# Patient Record
Sex: FEMALE | Race: BLACK OR AFRICAN AMERICAN | Employment: UNEMPLOYED | URBAN - METROPOLITAN AREA
[De-identification: names, ages, dates, MRNs, and addresses within clinical notes are randomized per-mention and may not be internally consistent; named-entity substitution may affect disease eponyms.]

---

## 2024-01-01 ENCOUNTER — APPOINTMENT (OUTPATIENT)
Dept: ULTRASOUND IMAGING | Facility: HOSPITAL | Age: 44
End: 2024-01-01
Attending: INTERNAL MEDICINE

## 2024-01-01 ENCOUNTER — APPOINTMENT (OUTPATIENT)
Dept: CT IMAGING | Facility: HOSPITAL | Age: 44
End: 2024-01-01
Attending: EMERGENCY MEDICINE

## 2024-01-01 ENCOUNTER — APPOINTMENT (OUTPATIENT)
Dept: CT IMAGING | Facility: HOSPITAL | Age: 44
End: 2024-01-01
Attending: Other

## 2024-01-01 ENCOUNTER — APPOINTMENT (OUTPATIENT)
Dept: GENERAL RADIOLOGY | Facility: HOSPITAL | Age: 44
End: 2024-01-01
Attending: INTERNAL MEDICINE

## 2024-01-01 ENCOUNTER — APPOINTMENT (OUTPATIENT)
Dept: GENERAL RADIOLOGY | Facility: HOSPITAL | Age: 44
End: 2024-01-01
Attending: NURSE PRACTITIONER

## 2024-01-01 ENCOUNTER — APPOINTMENT (OUTPATIENT)
Dept: GENERAL RADIOLOGY | Facility: HOSPITAL | Age: 44
End: 2024-01-01
Attending: EMERGENCY MEDICINE

## 2024-01-01 ENCOUNTER — HOSPITAL ENCOUNTER (INPATIENT)
Facility: HOSPITAL | Age: 44
LOS: 8 days | End: 2024-01-01
Attending: EMERGENCY MEDICINE | Admitting: HOSPITALIST

## 2024-01-01 ENCOUNTER — APPOINTMENT (OUTPATIENT)
Dept: MRI IMAGING | Facility: HOSPITAL | Age: 44
End: 2024-01-01
Attending: Other

## 2024-01-01 VITALS
OXYGEN SATURATION: 100 % | BODY MASS INDEX: 19.03 KG/M2 | DIASTOLIC BLOOD PRESSURE: 72 MMHG | SYSTOLIC BLOOD PRESSURE: 126 MMHG | HEART RATE: 54 BPM | TEMPERATURE: 97 F | HEIGHT: 67 IN | WEIGHT: 121.25 LBS

## 2024-01-01 DIAGNOSIS — E87.1 HYPONATREMIA: ICD-10-CM

## 2024-01-01 DIAGNOSIS — R41.0 DELIRIUM: Primary | ICD-10-CM

## 2024-01-01 DIAGNOSIS — R56.9 NEW ONSET SEIZURE (HCC): ICD-10-CM

## 2024-01-01 DIAGNOSIS — E87.6 HYPOKALEMIA: ICD-10-CM

## 2024-01-01 LAB
ADENOVIRUS PCR:: NOT DETECTED
ALBUMIN CSF: 197 MG/DL
ALBUMIN SERPL-MCNC: 2.2 G/DL (ref 3.2–4.8)
ALBUMIN SERPL-MCNC: 2.2 G/DL (ref 3.2–4.8)
ALBUMIN SERPL-MCNC: 2.3 G/DL (ref 3.2–4.8)
ALBUMIN SERPL-MCNC: 2.5 G/DL (ref 3.2–4.8)
ALBUMIN SERPL-MCNC: 2.7 G/DL (ref 3.2–4.8)
ALBUMIN SERPL-MCNC: 3.3 G/DL (ref 3.2–4.8)
ALBUMIN SERPL-MCNC: 4.2 G/DL (ref 3.2–4.8)
ALBUMIN SERPL-MCNC: 4.4 G/DL (ref 3.2–4.8)
ALBUMIN/GLOB SERPL: 0.6 {RATIO} (ref 1–2)
ALBUMIN/GLOB SERPL: 0.7 {RATIO} (ref 1–2)
ALBUMIN/GLOB SERPL: 0.7 {RATIO} (ref 1–2)
ALBUMIN/GLOB SERPL: 0.9 {RATIO} (ref 1–2)
ALBUMIN: 2.5 G/DL
ALP LIVER SERPL-CCNC: 230 U/L
ALP LIVER SERPL-CCNC: 267 U/L
ALP LIVER SERPL-CCNC: 283 U/L
ALP LIVER SERPL-CCNC: 292 U/L
ALP LIVER SERPL-CCNC: 329 U/L
ALP LIVER SERPL-CCNC: 433 U/L
ALP LIVER SERPL-CCNC: 532 U/L
ALP LIVER SERPL-CCNC: 564 U/L
ALT SERPL-CCNC: 29 U/L
ALT SERPL-CCNC: 35 U/L
ALT SERPL-CCNC: 35 U/L
ALT SERPL-CCNC: 38 U/L
ALT SERPL-CCNC: 39 U/L
ALT SERPL-CCNC: 48 U/L
ALT SERPL-CCNC: 56 U/L
ALT SERPL-CCNC: 60 U/L
AMMONIA PLAS-MCNC: <10 UMOL/L (ref 11–32)
AMPHET UR QL SCN: NEGATIVE
ANION GAP SERPL CALC-SCNC: 1 MMOL/L (ref 0–18)
ANION GAP SERPL CALC-SCNC: 10 MMOL/L (ref 0–18)
ANION GAP SERPL CALC-SCNC: 14 MMOL/L (ref 0–18)
ANION GAP SERPL CALC-SCNC: 14 MMOL/L (ref 0–18)
ANION GAP SERPL CALC-SCNC: 4 MMOL/L (ref 0–18)
ANION GAP SERPL CALC-SCNC: 5 MMOL/L (ref 0–18)
ANION GAP SERPL CALC-SCNC: 5 MMOL/L (ref 0–18)
ANION GAP SERPL CALC-SCNC: 6 MMOL/L (ref 0–18)
ANION GAP SERPL CALC-SCNC: 7 MMOL/L (ref 0–18)
ANION GAP SERPL CALC-SCNC: 8 MMOL/L (ref 0–18)
ANION GAP SERPL CALC-SCNC: 9 MMOL/L (ref 0–18)
APTT PPP: 34.2 SECONDS (ref 23–36)
AST SERPL-CCNC: 107 U/L (ref ?–34)
AST SERPL-CCNC: 114 U/L (ref ?–34)
AST SERPL-CCNC: 64 U/L (ref ?–34)
AST SERPL-CCNC: 71 U/L (ref ?–34)
AST SERPL-CCNC: 76 U/L (ref ?–34)
AST SERPL-CCNC: 77 U/L (ref ?–34)
AST SERPL-CCNC: 78 U/L (ref ?–34)
AST SERPL-CCNC: 78 U/L (ref ?–34)
ATRIAL RATE: 116 BPM
ATRIAL RATE: 119 BPM
ATRIAL RATE: 85 BPM
ATRIAL RATE: 85 BPM
B PARAPERT DNA SPEC QL NAA+PROBE: NOT DETECTED
B PERT DNA SPEC QL NAA+PROBE: NOT DETECTED
BARBITURATES UR QL SCN: NEGATIVE
BASE EXCESS BLD CALC-SCNC: -10 MMOL/L (ref ?–2)
BASE EXCESS BLD CALC-SCNC: -10.2 MMOL/L (ref ?–2)
BASE EXCESS BLD CALC-SCNC: -6.9 MMOL/L (ref ?–2)
BASE EXCESS BLD CALC-SCNC: -9 MMOL/L (ref ?–2)
BASE EXCESS BLD CALC-SCNC: 0.8 MMOL/L (ref ?–2)
BASE EXCESS BLD CALC-SCNC: 0.9 MMOL/L (ref ?–2)
BASE EXCESS BLD CALC-SCNC: 2.7 MMOL/L (ref ?–2)
BASOPHILS # BLD AUTO: 0 X10(3) UL (ref 0–0.2)
BASOPHILS # BLD AUTO: 0.01 X10(3) UL (ref 0–0.2)
BASOPHILS # BLD AUTO: 0.03 X10(3) UL (ref 0–0.2)
BASOPHILS # BLD: 0.16 X10(3) UL (ref 0–0.2)
BASOPHILS NFR BLD AUTO: 0 %
BASOPHILS NFR BLD AUTO: 0.1 %
BASOPHILS NFR BLD AUTO: 0.2 %
BASOPHILS NFR BLD AUTO: 0.2 %
BASOPHILS NFR BLD AUTO: 0.3 %
BASOPHILS NFR BLD: 1 %
BASOPHILS NFR CSF: 0 %
BENZODIAZ UR QL SCN: NEGATIVE
BILIRUB DIRECT SERPL-MCNC: 0.6 MG/DL (ref ?–0.3)
BILIRUB SERPL-MCNC: 0.6 MG/DL (ref 0.3–1.2)
BILIRUB SERPL-MCNC: 0.6 MG/DL (ref 0.3–1.2)
BILIRUB SERPL-MCNC: 0.8 MG/DL (ref 0.3–1.2)
BILIRUB SERPL-MCNC: 0.8 MG/DL (ref 0.3–1.2)
BILIRUB SERPL-MCNC: 1 MG/DL (ref 0.3–1.2)
BILIRUB SERPL-MCNC: 1.1 MG/DL (ref 0.3–1.2)
BILIRUB SERPL-MCNC: 1.4 MG/DL (ref 0.3–1.2)
BILIRUB SERPL-MCNC: 1.4 MG/DL (ref 0.3–1.2)
BILIRUB UR QL: NEGATIVE
BUN BLD-MCNC: 10 MG/DL (ref 9–23)
BUN BLD-MCNC: 11 MG/DL (ref 9–23)
BUN BLD-MCNC: 11 MG/DL (ref 9–23)
BUN BLD-MCNC: 12 MG/DL (ref 9–23)
BUN BLD-MCNC: 13 MG/DL (ref 9–23)
BUN BLD-MCNC: 14 MG/DL (ref 9–23)
BUN BLD-MCNC: 14 MG/DL (ref 9–23)
BUN BLD-MCNC: 16 MG/DL (ref 9–23)
BUN BLD-MCNC: 18 MG/DL (ref 9–23)
BUN BLD-MCNC: 18 MG/DL (ref 9–23)
BUN BLD-MCNC: 20 MG/DL (ref 9–23)
BUN BLD-MCNC: 21 MG/DL (ref 9–23)
BUN BLD-MCNC: 23 MG/DL (ref 9–23)
BUN BLD-MCNC: 24 MG/DL (ref 9–23)
BUN BLD-MCNC: 25 MG/DL (ref 9–23)
BUN BLD-MCNC: 27 MG/DL (ref 9–23)
BUN BLD-MCNC: 31 MG/DL (ref 9–23)
BUN BLD-MCNC: 32 MG/DL (ref 9–23)
BUN BLD-MCNC: 6 MG/DL (ref 9–23)
BUN BLD-MCNC: 7 MG/DL (ref 9–23)
BUN BLD-MCNC: 9 MG/DL (ref 9–23)
BUN/CREAT SERPL: 10 (ref 10–20)
BUN/CREAT SERPL: 10.3 (ref 10–20)
BUN/CREAT SERPL: 10.3 (ref 10–20)
BUN/CREAT SERPL: 10.7 (ref 10–20)
BUN/CREAT SERPL: 10.8 (ref 10–20)
BUN/CREAT SERPL: 10.8 (ref 10–20)
BUN/CREAT SERPL: 10.9 (ref 10–20)
BUN/CREAT SERPL: 10.9 (ref 10–20)
BUN/CREAT SERPL: 12.9 (ref 10–20)
BUN/CREAT SERPL: 14.8 (ref 10–20)
BUN/CREAT SERPL: 16.7 (ref 10–20)
BUN/CREAT SERPL: 17.8 (ref 10–20)
BUN/CREAT SERPL: 18 (ref 10–20)
BUN/CREAT SERPL: 25 (ref 10–20)
BUN/CREAT SERPL: 25.4 (ref 10–20)
BUN/CREAT SERPL: 26.4 (ref 10–20)
BUN/CREAT SERPL: 8.1 (ref 10–20)
BUN/CREAT SERPL: 8.8 (ref 10–20)
BUN/CREAT SERPL: 8.9 (ref 10–20)
BUN/CREAT SERPL: 9.5 (ref 10–20)
BUN/CREAT SERPL: 9.7 (ref 10–20)
BUN/CREAT SERPL: 9.7 (ref 10–20)
BUN/CREAT SERPL: 9.9 (ref 10–20)
C PNEUM DNA SPEC QL NAA+PROBE: NOT DETECTED
CALCIUM BLD-MCNC: 5.4 MG/DL (ref 8.7–10.4)
CALCIUM BLD-MCNC: 6.4 MG/DL (ref 8.7–10.4)
CALCIUM BLD-MCNC: 6.6 MG/DL (ref 8.7–10.4)
CALCIUM BLD-MCNC: 6.8 MG/DL (ref 8.7–10.4)
CALCIUM BLD-MCNC: 6.9 MG/DL (ref 8.7–10.4)
CALCIUM BLD-MCNC: 6.9 MG/DL (ref 8.7–10.4)
CALCIUM BLD-MCNC: 7 MG/DL (ref 8.7–10.4)
CALCIUM BLD-MCNC: 7.1 MG/DL (ref 8.7–10.4)
CALCIUM BLD-MCNC: 7.1 MG/DL (ref 8.7–10.4)
CALCIUM BLD-MCNC: 7.2 MG/DL (ref 8.7–10.4)
CALCIUM BLD-MCNC: 7.3 MG/DL (ref 8.7–10.4)
CALCIUM BLD-MCNC: 7.3 MG/DL (ref 8.7–10.4)
CALCIUM BLD-MCNC: 7.4 MG/DL (ref 8.7–10.4)
CALCIUM BLD-MCNC: 7.5 MG/DL (ref 8.7–10.4)
CALCIUM BLD-MCNC: 7.6 MG/DL (ref 8.7–10.4)
CALCIUM BLD-MCNC: 7.6 MG/DL (ref 8.7–10.4)
CALCIUM BLD-MCNC: 7.7 MG/DL (ref 8.7–10.4)
CALCIUM BLD-MCNC: 7.8 MG/DL (ref 8.7–10.4)
CALCIUM BLD-MCNC: 8 MG/DL (ref 8.7–10.4)
CALCIUM BLD-MCNC: 8.1 MG/DL (ref 8.7–10.4)
CALCIUM BLD-MCNC: 8.3 MG/DL (ref 8.7–10.4)
CALCIUM BLD-MCNC: 8.4 MG/DL (ref 8.7–10.4)
CALCIUM BLD-MCNC: 9.2 MG/DL (ref 8.7–10.4)
CALCIUM BLD-MCNC: 9.5 MG/DL (ref 8.7–10.4)
CD10 CELLS NFR SPEC: <1 %
CD10/CD19: <1 %
CD19 CELLS NFR SPEC: 1 %
CD19+/CD200+: <1 %
CD2 CELLS NFR SPEC: 96 %
CD20 CELLS NFR SPEC: 1 %
CD200 CELLS: 2 %
CD3 CELLS NFR SPEC: 74 %
CD3+/TCRGD+: 1 %
CD3+CD4+ CELLS NFR SPEC: 64 %
CD3+CD4+ CELLS/CD3+CD8+ CLL SPEC: 6.4
CD3+CD8+ CELLS NFR SPEC: 10 %
CD3-/CD56+: 24 %
CD34 CELLS NFR SPEC: <1 %
CD38 CELLS NFR SPEC: 26 %
CD38+/CD19+: <1 %
CD45 CELLS NFR SPEC: 100 %
CD5 CELLS NFR SPEC: 75 %
CD5/CD19 CELLS: <1 %
CD7 CELLS NFR SPEC: 89 %
CELL SURF LAMBDA LIGHT CHAIN: <1 %
CELL SURFACE KAPPA LIGHT CHAIN: <1 %
CHLORIDE SERPL-SCNC: 102 MMOL/L (ref 98–112)
CHLORIDE SERPL-SCNC: 104 MMOL/L (ref 98–112)
CHLORIDE SERPL-SCNC: 105 MMOL/L (ref 98–112)
CHLORIDE SERPL-SCNC: 107 MMOL/L (ref 98–112)
CHLORIDE SERPL-SCNC: 111 MMOL/L (ref 98–112)
CHLORIDE SERPL-SCNC: 112 MMOL/L (ref 98–112)
CHLORIDE SERPL-SCNC: 118 MMOL/L (ref 98–112)
CHLORIDE SERPL-SCNC: 119 MMOL/L (ref 98–112)
CHLORIDE SERPL-SCNC: 121 MMOL/L (ref 98–112)
CHLORIDE SERPL-SCNC: 122 MMOL/L (ref 98–112)
CHLORIDE SERPL-SCNC: 122 MMOL/L (ref 98–112)
CHLORIDE SERPL-SCNC: 123 MMOL/L (ref 98–112)
CHLORIDE SERPL-SCNC: 124 MMOL/L (ref 98–112)
CHLORIDE SERPL-SCNC: 126 MMOL/L (ref 98–112)
CHLORIDE SERPL-SCNC: 126 MMOL/L (ref 98–112)
CHLORIDE SERPL-SCNC: 128 MMOL/L (ref 98–112)
CHLORIDE SERPL-SCNC: 129 MMOL/L (ref 98–112)
CHLORIDE SERPL-SCNC: 129 MMOL/L (ref 98–112)
CHLORIDE SERPL-SCNC: 131 MMOL/L (ref 98–112)
CHLORIDE SERPL-SCNC: 132 MMOL/L (ref 98–112)
CHLORIDE SERPL-SCNC: 133 MMOL/L (ref 98–112)
CHLORIDE SERPL-SCNC: 134 MMOL/L (ref 98–112)
CHLORIDE SERPL-SCNC: 135 MMOL/L (ref 98–112)
CHLORIDE SERPL-SCNC: 93 MMOL/L (ref 98–112)
CHLORIDE SERPL-SCNC: 93 MMOL/L (ref 98–112)
CHLORIDE SERPL-SCNC: 95 MMOL/L (ref 98–112)
CHLORIDE SERPL-SCNC: 98 MMOL/L (ref 98–112)
CHLORIDE SERPL-SCNC: 99 MMOL/L (ref 98–112)
CHOLEST SERPL-MCNC: 255 MG/DL (ref ?–200)
CLARITY UR: CLEAR
CLARITY UR: CLEAR
CO2 SERPL-SCNC: 15 MMOL/L (ref 21–32)
CO2 SERPL-SCNC: 16 MMOL/L (ref 21–32)
CO2 SERPL-SCNC: 17 MMOL/L (ref 21–32)
CO2 SERPL-SCNC: 18 MMOL/L (ref 21–32)
CO2 SERPL-SCNC: 19 MMOL/L (ref 21–32)
CO2 SERPL-SCNC: 20 MMOL/L (ref 21–32)
CO2 SERPL-SCNC: 21 MMOL/L (ref 21–32)
CO2 SERPL-SCNC: 22 MMOL/L (ref 21–32)
CO2 SERPL-SCNC: 24 MMOL/L (ref 21–32)
CO2 SERPL-SCNC: 25 MMOL/L (ref 21–32)
CO2 SERPL-SCNC: 32 MMOL/L (ref 21–32)
COCAINE UR QL: NEGATIVE
COLOR UR: YELLOW
CORONAVIRUS 229E PCR:: NOT DETECTED
CORONAVIRUS HKU1 PCR:: NOT DETECTED
CORONAVIRUS NL63 PCR:: NOT DETECTED
CORONAVIRUS OC43 PCR:: NOT DETECTED
CREAT BLD-MCNC: 0.5 MG/DL
CREAT BLD-MCNC: 0.53 MG/DL
CREAT BLD-MCNC: 0.6 MG/DL
CREAT BLD-MCNC: 0.6 MG/DL
CREAT BLD-MCNC: 0.62 MG/DL
CREAT BLD-MCNC: 0.63 MG/DL
CREAT BLD-MCNC: 0.64 MG/DL
CREAT BLD-MCNC: 0.7 MG/DL
CREAT BLD-MCNC: 0.71 MG/DL
CREAT BLD-MCNC: 0.71 MG/DL
CREAT BLD-MCNC: 0.72 MG/DL
CREAT BLD-MCNC: 0.73 MG/DL
CREAT BLD-MCNC: 0.74 MG/DL
CREAT BLD-MCNC: 0.74 MG/DL
CREAT BLD-MCNC: 0.79 MG/DL
CREAT BLD-MCNC: 0.8 MG/DL
CREAT BLD-MCNC: 0.81 MG/DL
CREAT BLD-MCNC: 0.9 MG/DL
CREAT BLD-MCNC: 1.07 MG/DL
CREAT BLD-MCNC: 1.1 MG/DL
CREAT BLD-MCNC: 1.29 MG/DL
CREAT BLD-MCNC: 1.65 MG/DL
CREAT BLD-MCNC: 1.86 MG/DL
CREAT BLD-MCNC: 1.95 MG/DL
CREAT BLD-MCNC: 2.24 MG/DL
CREAT BLD-MCNC: 2.41 MG/DL
CREAT BLD-MCNC: 2.53 MG/DL
CREAT BLD-MCNC: 2.63 MG/DL
CREAT BLD-MCNC: 3.13 MG/DL
CREAT BLD-MCNC: 3.22 MG/DL
CREAT UR-SCNC: 36.4 MG/DL
CREAT UR-SCNC: 69.6 MG/DL
CRP SERPL-MCNC: 1.3 MG/DL (ref ?–1)
CRYPTOCOCCAL ANTIGEN CSF: NEGATIVE
CSF IGG INDEX: 0.3
CSF/SER ALB INDEX: 79
CYTOMEGALOVIRUS (CMV): NOT DETECTED
CYTOMEGALOVIRUS (CMV): NOT DETECTED
DEPRECATED RDW RBC AUTO: 41.1 FL (ref 35.1–46.3)
DEPRECATED RDW RBC AUTO: 43.5 FL (ref 35.1–46.3)
DEPRECATED RDW RBC AUTO: 43.7 FL (ref 35.1–46.3)
DEPRECATED RDW RBC AUTO: 43.8 FL (ref 35.1–46.3)
DEPRECATED RDW RBC AUTO: 45.6 FL (ref 35.1–46.3)
DEPRECATED RDW RBC AUTO: 47.7 FL (ref 35.1–46.3)
DEPRECATED RDW RBC AUTO: 50.9 FL (ref 35.1–46.3)
DEPRECATED RDW RBC AUTO: 52.5 FL (ref 35.1–46.3)
EGFRCR SERPLBLD CKD-EPI 2021: 102 ML/MIN/1.73M2 (ref 60–?)
EGFRCR SERPLBLD CKD-EPI 2021: 102 ML/MIN/1.73M2 (ref 60–?)
EGFRCR SERPLBLD CKD-EPI 2021: 103 ML/MIN/1.73M2 (ref 60–?)
EGFRCR SERPLBLD CKD-EPI 2021: 105 ML/MIN/1.73M2 (ref 60–?)
EGFRCR SERPLBLD CKD-EPI 2021: 107 ML/MIN/1.73M2 (ref 60–?)
EGFRCR SERPLBLD CKD-EPI 2021: 107 ML/MIN/1.73M2 (ref 60–?)
EGFRCR SERPLBLD CKD-EPI 2021: 109 ML/MIN/1.73M2 (ref 60–?)
EGFRCR SERPLBLD CKD-EPI 2021: 111 ML/MIN/1.73M2 (ref 60–?)
EGFRCR SERPLBLD CKD-EPI 2021: 111 ML/MIN/1.73M2 (ref 60–?)
EGFRCR SERPLBLD CKD-EPI 2021: 112 ML/MIN/1.73M2 (ref 60–?)
EGFRCR SERPLBLD CKD-EPI 2021: 113 ML/MIN/1.73M2 (ref 60–?)
EGFRCR SERPLBLD CKD-EPI 2021: 113 ML/MIN/1.73M2 (ref 60–?)
EGFRCR SERPLBLD CKD-EPI 2021: 116 ML/MIN/1.73M2 (ref 60–?)
EGFRCR SERPLBLD CKD-EPI 2021: 118 ML/MIN/1.73M2 (ref 60–?)
EGFRCR SERPLBLD CKD-EPI 2021: 17 ML/MIN/1.73M2 (ref 60–?)
EGFRCR SERPLBLD CKD-EPI 2021: 18 ML/MIN/1.73M2 (ref 60–?)
EGFRCR SERPLBLD CKD-EPI 2021: 22 ML/MIN/1.73M2 (ref 60–?)
EGFRCR SERPLBLD CKD-EPI 2021: 23 ML/MIN/1.73M2 (ref 60–?)
EGFRCR SERPLBLD CKD-EPI 2021: 25 ML/MIN/1.73M2 (ref 60–?)
EGFRCR SERPLBLD CKD-EPI 2021: 27 ML/MIN/1.73M2 (ref 60–?)
EGFRCR SERPLBLD CKD-EPI 2021: 32 ML/MIN/1.73M2 (ref 60–?)
EGFRCR SERPLBLD CKD-EPI 2021: 34 ML/MIN/1.73M2 (ref 60–?)
EGFRCR SERPLBLD CKD-EPI 2021: 39 ML/MIN/1.73M2 (ref 60–?)
EGFRCR SERPLBLD CKD-EPI 2021: 52 ML/MIN/1.73M2 (ref 60–?)
EGFRCR SERPLBLD CKD-EPI 2021: 63 ML/MIN/1.73M2 (ref 60–?)
EGFRCR SERPLBLD CKD-EPI 2021: 65 ML/MIN/1.73M2 (ref 60–?)
EGFRCR SERPLBLD CKD-EPI 2021: 80 ML/MIN/1.73M2 (ref 60–?)
EGFRCR SERPLBLD CKD-EPI 2021: 91 ML/MIN/1.73M2 (ref 60–?)
EGFRCR SERPLBLD CKD-EPI 2021: 93 ML/MIN/1.73M2 (ref 60–?)
EGFRCR SERPLBLD CKD-EPI 2021: 94 ML/MIN/1.73M2 (ref 60–?)
ENTEROVIRUS (EV): NOT DETECTED
ENTEROVIRUS (EV): NOT DETECTED
EOSINOPHIL # BLD AUTO: 0 X10(3) UL (ref 0–0.7)
EOSINOPHIL # BLD AUTO: 0.16 X10(3) UL (ref 0–0.7)
EOSINOPHIL # BLD: 0.31 X10(3) UL (ref 0–0.7)
EOSINOPHIL NFR BLD AUTO: 0 %
EOSINOPHIL NFR BLD AUTO: 1.4 %
EOSINOPHIL NFR BLD: 2 %
EOSINOPHIL NFR CSF: 0 %
ERYTHROCYTE [DISTWIDTH] IN BLOOD BY AUTOMATED COUNT: 14.7 % (ref 11–15)
ERYTHROCYTE [DISTWIDTH] IN BLOOD BY AUTOMATED COUNT: 15.1 % (ref 11–15)
ERYTHROCYTE [DISTWIDTH] IN BLOOD BY AUTOMATED COUNT: 15.1 % (ref 11–15)
ERYTHROCYTE [DISTWIDTH] IN BLOOD BY AUTOMATED COUNT: 15.2 % (ref 11–15)
ERYTHROCYTE [DISTWIDTH] IN BLOOD BY AUTOMATED COUNT: 15.9 % (ref 11–15)
ERYTHROCYTE [DISTWIDTH] IN BLOOD BY AUTOMATED COUNT: 16.6 % (ref 11–15)
ERYTHROCYTE [DISTWIDTH] IN BLOOD BY AUTOMATED COUNT: 17.6 % (ref 11–15)
ERYTHROCYTE [DISTWIDTH] IN BLOOD BY AUTOMATED COUNT: 18 % (ref 11–15)
ERYTHROCYTE [SEDIMENTATION RATE] IN BLOOD: 127 MM/HR
ESCHERICHIA COLI K1: NOT DETECTED
ESCHERICHIA COLI K1: NOT DETECTED
EST. AVERAGE GLUCOSE BLD GHB EST-MCNC: 117 MG/DL (ref 68–126)
ETHANOL SERPL-MCNC: <3 MG/DL (ref ?–3)
FENTANYL UR QL SCN: NEGATIVE
FLUAV RNA SPEC QL NAA+PROBE: NOT DETECTED
FLUBV RNA SPEC QL NAA+PROBE: NOT DETECTED
GGT SERPL-CCNC: 807 U/L
GLOBULIN PLAS-MCNC: 3.6 G/DL (ref 2–3.5)
GLOBULIN PLAS-MCNC: 3.7 G/DL (ref 2–3.5)
GLOBULIN PLAS-MCNC: 3.7 G/DL (ref 2–3.5)
GLOBULIN PLAS-MCNC: 3.9 G/DL (ref 2–3.5)
GLOBULIN PLAS-MCNC: 4 G/DL (ref 2–3.5)
GLOBULIN PLAS-MCNC: 4.8 G/DL (ref 2–3.5)
GLOBULIN PLAS-MCNC: 4.9 G/DL (ref 2–3.5)
GLUCOSE BLD-MCNC: 102 MG/DL (ref 70–99)
GLUCOSE BLD-MCNC: 105 MG/DL (ref 70–99)
GLUCOSE BLD-MCNC: 106 MG/DL (ref 70–99)
GLUCOSE BLD-MCNC: 110 MG/DL (ref 70–99)
GLUCOSE BLD-MCNC: 119 MG/DL (ref 70–99)
GLUCOSE BLD-MCNC: 119 MG/DL (ref 70–99)
GLUCOSE BLD-MCNC: 120 MG/DL (ref 70–99)
GLUCOSE BLD-MCNC: 122 MG/DL (ref 70–99)
GLUCOSE BLD-MCNC: 127 MG/DL (ref 70–99)
GLUCOSE BLD-MCNC: 128 MG/DL (ref 70–99)
GLUCOSE BLD-MCNC: 135 MG/DL (ref 70–99)
GLUCOSE BLD-MCNC: 150 MG/DL (ref 70–99)
GLUCOSE BLD-MCNC: 186 MG/DL (ref 70–99)
GLUCOSE BLD-MCNC: 229 MG/DL (ref 70–99)
GLUCOSE BLD-MCNC: 355 MG/DL (ref 70–99)
GLUCOSE BLD-MCNC: 407 MG/DL (ref 70–99)
GLUCOSE BLD-MCNC: 456 MG/DL (ref 70–99)
GLUCOSE BLD-MCNC: 457 MG/DL (ref 70–99)
GLUCOSE BLD-MCNC: 518 MG/DL (ref 70–99)
GLUCOSE BLD-MCNC: 682 MG/DL (ref 70–99)
GLUCOSE BLD-MCNC: 73 MG/DL (ref 70–99)
GLUCOSE BLD-MCNC: 82 MG/DL (ref 70–99)
GLUCOSE BLD-MCNC: 85 MG/DL (ref 70–99)
GLUCOSE BLD-MCNC: 86 MG/DL (ref 70–99)
GLUCOSE BLD-MCNC: 86 MG/DL (ref 70–99)
GLUCOSE BLD-MCNC: 88 MG/DL (ref 70–99)
GLUCOSE BLD-MCNC: 90 MG/DL (ref 70–99)
GLUCOSE BLD-MCNC: 90 MG/DL (ref 70–99)
GLUCOSE BLD-MCNC: 92 MG/DL (ref 70–99)
GLUCOSE BLD-MCNC: 93 MG/DL (ref 70–99)
GLUCOSE BLD-MCNC: 99 MG/DL (ref 70–99)
GLUCOSE BLDC GLUCOMTR-MCNC: 109 MG/DL (ref 70–99)
GLUCOSE BLDC GLUCOMTR-MCNC: 110 MG/DL (ref 70–99)
GLUCOSE BLDC GLUCOMTR-MCNC: 113 MG/DL (ref 70–99)
GLUCOSE BLDC GLUCOMTR-MCNC: 114 MG/DL (ref 70–99)
GLUCOSE BLDC GLUCOMTR-MCNC: 115 MG/DL (ref 70–99)
GLUCOSE BLDC GLUCOMTR-MCNC: 117 MG/DL (ref 70–99)
GLUCOSE BLDC GLUCOMTR-MCNC: 117 MG/DL (ref 70–99)
GLUCOSE BLDC GLUCOMTR-MCNC: 118 MG/DL (ref 70–99)
GLUCOSE BLDC GLUCOMTR-MCNC: 123 MG/DL (ref 70–99)
GLUCOSE BLDC GLUCOMTR-MCNC: 123 MG/DL (ref 70–99)
GLUCOSE BLDC GLUCOMTR-MCNC: 127 MG/DL (ref 70–99)
GLUCOSE BLDC GLUCOMTR-MCNC: 128 MG/DL (ref 70–99)
GLUCOSE BLDC GLUCOMTR-MCNC: 135 MG/DL (ref 70–99)
GLUCOSE BLDC GLUCOMTR-MCNC: 142 MG/DL (ref 70–99)
GLUCOSE BLDC GLUCOMTR-MCNC: 158 MG/DL (ref 70–99)
GLUCOSE BLDC GLUCOMTR-MCNC: 185 MG/DL (ref 70–99)
GLUCOSE BLDC GLUCOMTR-MCNC: 185 MG/DL (ref 70–99)
GLUCOSE BLDC GLUCOMTR-MCNC: 187 MG/DL (ref 70–99)
GLUCOSE BLDC GLUCOMTR-MCNC: 191 MG/DL (ref 70–99)
GLUCOSE BLDC GLUCOMTR-MCNC: 251 MG/DL (ref 70–99)
GLUCOSE BLDC GLUCOMTR-MCNC: 74 MG/DL (ref 70–99)
GLUCOSE BLDC GLUCOMTR-MCNC: 84 MG/DL (ref 70–99)
GLUCOSE BLDC GLUCOMTR-MCNC: 89 MG/DL (ref 70–99)
GLUCOSE BLDC GLUCOMTR-MCNC: 92 MG/DL (ref 70–99)
GLUCOSE BLDC GLUCOMTR-MCNC: 93 MG/DL (ref 70–99)
GLUCOSE BLDC GLUCOMTR-MCNC: 95 MG/DL (ref 70–99)
GLUCOSE BLDC GLUCOMTR-MCNC: 96 MG/DL (ref 70–99)
GLUCOSE BLDC GLUCOMTR-MCNC: 97 MG/DL (ref 70–99)
GLUCOSE CSF-MCNC: 36 MG/DL (ref 40–70)
GLUCOSE UR-MCNC: 30 MG/DL
GLUCOSE UR-MCNC: NORMAL MG/DL
GLUCOSE UR-MCNC: NORMAL MG/DL
HAEMOPHILUS INFLUENZAE: NOT DETECTED
HAEMOPHILUS INFLUENZAE: NOT DETECTED
HBA1C MFR BLD: 5.7 % (ref ?–5.7)
HCO3 BLDA-SCNC: 16.8 MEQ/L (ref 21–27)
HCO3 BLDA-SCNC: 17 MEQ/L (ref 21–27)
HCO3 BLDA-SCNC: 17.9 MEQ/L (ref 21–27)
HCO3 BLDA-SCNC: 19.5 MEQ/L (ref 21–27)
HCO3 BLDA-SCNC: 25.5 MEQ/L (ref 21–27)
HCO3 BLDA-SCNC: 25.6 MEQ/L (ref 21–27)
HCO3 BLDA-SCNC: 26.9 MEQ/L (ref 21–27)
HCT VFR BLD AUTO: 28 %
HCT VFR BLD AUTO: 28.6 %
HCT VFR BLD AUTO: 29 %
HCT VFR BLD AUTO: 29 %
HCT VFR BLD AUTO: 30.2 %
HCT VFR BLD AUTO: 35 %
HCT VFR BLD AUTO: 36.1 %
HCT VFR BLD AUTO: 36.2 %
HDLC SERPL-MCNC: 68 MG/DL (ref 40–59)
HERPES SIMPLEX VIRUS 1 (HSV-1): NOT DETECTED
HERPES SIMPLEX VIRUS 1 (HSV-1): NOT DETECTED
HERPES SIMPLEX VIRUS 2 (HSV-2): NOT DETECTED
HERPES SIMPLEX VIRUS 2 (HSV-2): NOT DETECTED
HGB BLD-MCNC: 11.6 G/DL
HGB BLD-MCNC: 11.7 G/DL
HGB BLD-MCNC: 11.8 G/DL
HGB BLD-MCNC: 9.2 G/DL
HGB BLD-MCNC: 9.4 G/DL
HGB BLD-MCNC: 9.5 G/DL
HGB BLD-MCNC: 9.6 G/DL
HGB BLD-MCNC: 9.7 G/DL
HUMAN HERPESVIRUS 6 (HHV-6): NOT DETECTED
HUMAN HERPESVIRUS 6 (HHV-6): NOT DETECTED
HUMAN PARECHOVIRUS (HPEV): NOT DETECTED
HUMAN PARECHOVIRUS (HPEV): NOT DETECTED
IGG CSF: 51.7 MG/DL
IGG SYNTHESIS  RATE CSF: -189.4 MG/DAY
IGG/ALB RATIO CSF: 0.26
IGG: 2580 MG/DL
IMM GRANULOCYTES # BLD AUTO: 0.04 X10(3) UL (ref 0–1)
IMM GRANULOCYTES # BLD AUTO: 0.05 X10(3) UL (ref 0–1)
IMM GRANULOCYTES # BLD AUTO: 0.05 X10(3) UL (ref 0–1)
IMM GRANULOCYTES # BLD AUTO: 0.07 X10(3) UL (ref 0–1)
IMM GRANULOCYTES # BLD AUTO: 0.09 X10(3) UL (ref 0–1)
IMM GRANULOCYTES # BLD AUTO: 0.11 X10(3) UL (ref 0–1)
IMM GRANULOCYTES # BLD AUTO: 0.23 X10(3) UL (ref 0–1)
IMM GRANULOCYTES NFR BLD: 0.7 %
IMM GRANULOCYTES NFR BLD: 0.8 %
IMM GRANULOCYTES NFR BLD: 0.9 %
IMM GRANULOCYTES NFR BLD: 1.2 %
IMM GRANULOCYTES NFR BLD: 1.3 %
IMM GRANULOCYTES NFR BLD: 2 %
IMM GRANULOCYTES NFR BLD: 2 %
INR BLD: 1.01 (ref 0.8–1.2)
KETONES UR-MCNC: NEGATIVE MG/DL
LACTATE SERPL-SCNC: 1.5 MMOL/L (ref 0.5–2)
LDLC SERPL CALC-MCNC: 172 MG/DL (ref ?–100)
LEUKOCYTE ESTERASE UR QL STRIP.AUTO: 75
LEUKOCYTE ESTERASE UR QL STRIP.AUTO: NEGATIVE
LEUKOCYTE ESTERASE UR QL STRIP.AUTO: NEGATIVE
LISTERIA MONOCYTOGENES: NOT DETECTED
LISTERIA MONOCYTOGENES: NOT DETECTED
LYMPHOCYTES # BLD AUTO: 0.12 X10(3) UL (ref 1–4)
LYMPHOCYTES # BLD AUTO: 0.19 X10(3) UL (ref 1–4)
LYMPHOCYTES # BLD AUTO: 0.31 X10(3) UL (ref 1–4)
LYMPHOCYTES # BLD AUTO: 0.72 X10(3) UL (ref 1–4)
LYMPHOCYTES # BLD AUTO: 0.74 X10(3) UL (ref 1–4)
LYMPHOCYTES # BLD AUTO: 0.79 X10(3) UL (ref 1–4)
LYMPHOCYTES # BLD AUTO: 1.54 X10(3) UL (ref 1–4)
LYMPHOCYTES NFR BLD AUTO: 13.6 %
LYMPHOCYTES NFR BLD AUTO: 14.5 %
LYMPHOCYTES NFR BLD AUTO: 3.1 %
LYMPHOCYTES NFR BLD AUTO: 3.6 %
LYMPHOCYTES NFR BLD AUTO: 4.4 %
LYMPHOCYTES NFR BLD AUTO: 9.6 %
LYMPHOCYTES NFR BLD AUTO: 9.6 %
LYMPHOCYTES NFR BLD: 0.79 X10(3) UL (ref 1–4)
LYMPHOCYTES NFR BLD: 5 %
LYMPHOCYTES NFR CSF: 94 % (ref 40–80)
MAGNESIUM SERPL-MCNC: 1 MG/DL (ref 1.6–2.6)
MAGNESIUM SERPL-MCNC: 1.4 MG/DL (ref 1.6–2.6)
MAGNESIUM SERPL-MCNC: 1.4 MG/DL (ref 1.6–2.6)
MAGNESIUM SERPL-MCNC: 1.7 MG/DL (ref 1.6–2.6)
MAGNESIUM SERPL-MCNC: 1.8 MG/DL (ref 1.6–2.6)
MAGNESIUM SERPL-MCNC: 1.8 MG/DL (ref 1.6–2.6)
MAGNESIUM SERPL-MCNC: 1.9 MG/DL (ref 1.6–2.6)
MAGNESIUM SERPL-MCNC: 1.9 MG/DL (ref 1.6–2.6)
MAGNESIUM SERPL-MCNC: 2.4 MG/DL (ref 1.6–2.6)
MAGNESIUM SERPL-MCNC: 2.8 MG/DL (ref 1.6–2.6)
MCH RBC QN AUTO: 25.8 PG (ref 26–34)
MCH RBC QN AUTO: 25.9 PG (ref 26–34)
MCH RBC QN AUTO: 26.1 PG (ref 26–34)
MCH RBC QN AUTO: 26.2 PG (ref 26–34)
MCHC RBC AUTO-ENTMCNC: 32 G/DL (ref 31–37)
MCHC RBC AUTO-ENTMCNC: 32.1 G/DL (ref 31–37)
MCHC RBC AUTO-ENTMCNC: 32.4 G/DL (ref 31–37)
MCHC RBC AUTO-ENTMCNC: 32.8 G/DL (ref 31–37)
MCHC RBC AUTO-ENTMCNC: 32.9 G/DL (ref 31–37)
MCHC RBC AUTO-ENTMCNC: 32.9 G/DL (ref 31–37)
MCHC RBC AUTO-ENTMCNC: 33.1 G/DL (ref 31–37)
MCHC RBC AUTO-ENTMCNC: 33.7 G/DL (ref 31–37)
MCV RBC AUTO: 77.4 FL
MCV RBC AUTO: 78.9 FL
MCV RBC AUTO: 79.2 FL
MCV RBC AUTO: 79.5 FL
MCV RBC AUTO: 79.7 FL
MCV RBC AUTO: 80.1 FL
MCV RBC AUTO: 80.3 FL
MCV RBC AUTO: 80.6 FL
MDMA UR QL SCN: NEGATIVE
METAPNEUMOVIRUS PCR:: NOT DETECTED
METHADONE UR QL SCN: NEGATIVE
MODIFIED ALLEN TEST: POSITIVE
MONOCYTES # BLD AUTO: 0.42 X10(3) UL (ref 0.1–1)
MONOCYTES # BLD AUTO: 0.44 X10(3) UL (ref 0.1–1)
MONOCYTES # BLD AUTO: 0.7 X10(3) UL (ref 0.1–1)
MONOCYTES # BLD AUTO: 0.76 X10(3) UL (ref 0.1–1)
MONOCYTES # BLD AUTO: 0.82 X10(3) UL (ref 0.1–1)
MONOCYTES # BLD AUTO: 0.95 X10(3) UL (ref 0.1–1)
MONOCYTES # BLD AUTO: 1.27 X10(3) UL (ref 0.1–1)
MONOCYTES # BLD: 0.79 X10(3) UL (ref 0.1–1)
MONOCYTES NFR BLD AUTO: 10.6 %
MONOCYTES NFR BLD AUTO: 10.9 %
MONOCYTES NFR BLD AUTO: 10.9 %
MONOCYTES NFR BLD AUTO: 11.2 %
MONOCYTES NFR BLD AUTO: 12.7 %
MONOCYTES NFR BLD AUTO: 12.9 %
MONOCYTES NFR BLD AUTO: 8.4 %
MONOCYTES NFR BLD: 5 %
MONOS+MACROS NFR CSF: 2 % (ref 15–45)
MRSA DNA SPEC QL NAA+PROBE: NEGATIVE
MYCOPLASMA PNEUMONIA PCR:: NOT DETECTED
NEISSERIA MENINGIDITIS: NOT DETECTED
NEISSERIA MENINGIDITIS: NOT DETECTED
NEUTROPHILS # BLD AUTO: 12.84 X10 (3) UL (ref 1.5–7.7)
NEUTROPHILS # BLD AUTO: 3.26 X10 (3) UL (ref 1.5–7.7)
NEUTROPHILS # BLD AUTO: 3.26 X10(3) UL (ref 1.5–7.7)
NEUTROPHILS # BLD AUTO: 3.83 X10 (3) UL (ref 1.5–7.7)
NEUTROPHILS # BLD AUTO: 3.83 X10(3) UL (ref 1.5–7.7)
NEUTROPHILS # BLD AUTO: 4.53 X10 (3) UL (ref 1.5–7.7)
NEUTROPHILS # BLD AUTO: 4.53 X10(3) UL (ref 1.5–7.7)
NEUTROPHILS # BLD AUTO: 5.73 X10 (3) UL (ref 1.5–7.7)
NEUTROPHILS # BLD AUTO: 5.73 X10(3) UL (ref 1.5–7.7)
NEUTROPHILS # BLD AUTO: 5.84 X10 (3) UL (ref 1.5–7.7)
NEUTROPHILS # BLD AUTO: 5.84 X10(3) UL (ref 1.5–7.7)
NEUTROPHILS # BLD AUTO: 6.05 X10 (3) UL (ref 1.5–7.7)
NEUTROPHILS # BLD AUTO: 6.05 X10(3) UL (ref 1.5–7.7)
NEUTROPHILS # BLD AUTO: 8.1 X10 (3) UL (ref 1.5–7.7)
NEUTROPHILS # BLD AUTO: 8.1 X10(3) UL (ref 1.5–7.7)
NEUTROPHILS NFR BLD AUTO: 70.4 %
NEUTROPHILS NFR BLD AUTO: 71.5 %
NEUTROPHILS NFR BLD AUTO: 76.7 %
NEUTROPHILS NFR BLD AUTO: 78.5 %
NEUTROPHILS NFR BLD AUTO: 83.9 %
NEUTROPHILS NFR BLD AUTO: 84.7 %
NEUTROPHILS NFR BLD AUTO: 87 %
NEUTROPHILS NFR BLD: 84 %
NEUTROPHILS NFR CSF: 4 % (ref 0–6)
NEUTS BAND NFR BLD: 3 %
NEUTS HYPERSEG # BLD: 13.66 X10(3) UL (ref 1.5–7.7)
NITRITE UR QL STRIP.AUTO: NEGATIVE
NONHDLC SERPL-MCNC: 187 MG/DL (ref ?–130)
NRBC BLD MANUAL-RTO: 1 %
NS CRYPTOCOCCUS (C. NEOFORMANS/C. GATTII): NOT DETECTED
O2 CT BLD-SCNC: 10.2 VOL% (ref 15–23)
O2 CT BLD-SCNC: 13.6 VOL% (ref 15–23)
O2 CT BLD-SCNC: 13.6 VOL% (ref 15–23)
O2 CT BLD-SCNC: 13.7 VOL% (ref 15–23)
O2 CT BLD-SCNC: 14.6 VOL% (ref 15–23)
O2 CT BLD-SCNC: 15.9 VOL% (ref 15–23)
O2 CT BLD-SCNC: 16.5 VOL% (ref 15–23)
O2/TOTAL GAS SETTING VFR VENT: 100 %
O2/TOTAL GAS SETTING VFR VENT: 40 %
O2/TOTAL GAS SETTING VFR VENT: 60 %
OPIATES UR QL SCN: NEGATIVE
OSMOLALITY SERPL CALC.SUM OF ELEC: 265 MOSM/KG (ref 275–295)
OSMOLALITY SERPL CALC.SUM OF ELEC: 269 MOSM/KG (ref 275–295)
OSMOLALITY SERPL CALC.SUM OF ELEC: 270 MOSM/KG (ref 275–295)
OSMOLALITY SERPL CALC.SUM OF ELEC: 270 MOSM/KG (ref 275–295)
OSMOLALITY SERPL CALC.SUM OF ELEC: 273 MOSM/KG (ref 275–295)
OSMOLALITY SERPL CALC.SUM OF ELEC: 274 MOSM/KG (ref 275–295)
OSMOLALITY SERPL CALC.SUM OF ELEC: 276 MOSM/KG (ref 275–295)
OSMOLALITY SERPL CALC.SUM OF ELEC: 280 MOSM/KG (ref 275–295)
OSMOLALITY SERPL CALC.SUM OF ELEC: 284 MOSM/KG (ref 275–295)
OSMOLALITY SERPL CALC.SUM OF ELEC: 285 MOSM/KG (ref 275–295)
OSMOLALITY SERPL CALC.SUM OF ELEC: 292 MOSM/KG (ref 275–295)
OSMOLALITY SERPL CALC.SUM OF ELEC: 294 MOSM/KG (ref 275–295)
OSMOLALITY SERPL CALC.SUM OF ELEC: 298 MOSM/KG (ref 275–295)
OSMOLALITY SERPL CALC.SUM OF ELEC: 299 MOSM/KG (ref 275–295)
OSMOLALITY SERPL CALC.SUM OF ELEC: 303 MOSM/KG (ref 275–295)
OSMOLALITY SERPL CALC.SUM OF ELEC: 307 MOSM/KG (ref 275–295)
OSMOLALITY SERPL CALC.SUM OF ELEC: 307 MOSM/KG (ref 275–295)
OSMOLALITY SERPL CALC.SUM OF ELEC: 308 MOSM/KG (ref 275–295)
OSMOLALITY SERPL CALC.SUM OF ELEC: 310 MOSM/KG (ref 275–295)
OSMOLALITY SERPL CALC.SUM OF ELEC: 314 MOSM/KG (ref 275–295)
OSMOLALITY SERPL CALC.SUM OF ELEC: 315 MOSM/KG (ref 275–295)
OSMOLALITY SERPL CALC.SUM OF ELEC: 319 MOSM/KG (ref 275–295)
OSMOLALITY SERPL CALC.SUM OF ELEC: 319 MOSM/KG (ref 275–295)
OSMOLALITY SERPL CALC.SUM OF ELEC: 324 MOSM/KG (ref 275–295)
OSMOLALITY SERPL CALC.SUM OF ELEC: 332 MOSM/KG (ref 275–295)
OSMOLALITY SERPL CALC.SUM OF ELEC: 334 MOSM/KG (ref 275–295)
OSMOLALITY SERPL CALC.SUM OF ELEC: 340 MOSM/KG (ref 275–295)
OSMOLALITY SERPL CALC.SUM OF ELEC: 351 MOSM/KG (ref 275–295)
OSMOLALITY SERPL CALC.SUM OF ELEC: 352 MOSM/KG (ref 275–295)
OSMOLALITY SERPL CALC.SUM OF ELEC: 356 MOSM/KG (ref 275–295)
OSMOLALITY UR: 332 MOSM/KG (ref 300–1100)
OXYCODONE UR QL SCN: NEGATIVE
OXYGEN LITERS/MINUTE: 10 L/MIN
OXYGEN LITERS/MINUTE: 15 L/MIN
OXYGEN LITERS/MINUTE: 15 L/MIN
OXYGEN LITERS/MINUTE: 2 L/MIN
P AXIS: 74 DEGREES
P AXIS: 75 DEGREES
P AXIS: 76 DEGREES
P AXIS: 77 DEGREES
P-R INTERVAL: 124 MS
P-R INTERVAL: 136 MS
P-R INTERVAL: 138 MS
P-R INTERVAL: 148 MS
PARAINFLUENZA 1 PCR:: NOT DETECTED
PARAINFLUENZA 2 PCR:: NOT DETECTED
PARAINFLUENZA 3 PCR:: NOT DETECTED
PARAINFLUENZA 4 PCR:: NOT DETECTED
PCO2 BLDA: 30 MM HG (ref 35–45)
PCO2 BLDA: 31 MM HG (ref 35–45)
PCO2 BLDA: 34 MM HG (ref 35–45)
PCO2 BLDA: 40 MM HG (ref 35–45)
PCO2 BLDA: 46 MM HG (ref 35–45)
PCO2 BLDA: 61 MM HG (ref 35–45)
PCO2 BLDA: 85 MM HG (ref 35–45)
PCP UR QL SCN: NEGATIVE
PEEP SETTING VENT: 5 CM H2O
PH BLDA: 7.01 [PH] (ref 7.35–7.45)
PH BLDA: 7.11 [PH] (ref 7.35–7.45)
PH BLDA: 7.21 [PH] (ref 7.35–7.45)
PH BLDA: 7.29 [PH] (ref 7.35–7.45)
PH BLDA: 7.49 [PH] (ref 7.35–7.45)
PH BLDA: 7.49 [PH] (ref 7.35–7.45)
PH BLDA: 7.5 [PH] (ref 7.35–7.45)
PH UR: 5 [PH] (ref 5–8)
PH UR: 7 [PH] (ref 5–8)
PH UR: 7.5 [PH] (ref 5–8)
PHOSPHATE SERPL-MCNC: 0.6 MG/DL (ref 2.4–5.1)
PHOSPHATE SERPL-MCNC: 0.6 MG/DL (ref 2.4–5.1)
PHOSPHATE SERPL-MCNC: 1.4 MG/DL (ref 2.4–5.1)
PHOSPHATE SERPL-MCNC: 2.1 MG/DL (ref 2.4–5.1)
PHOSPHATE SERPL-MCNC: 2.1 MG/DL (ref 2.4–5.1)
PHOSPHATE SERPL-MCNC: 2.2 MG/DL (ref 2.4–5.1)
PHOSPHATE SERPL-MCNC: 4.5 MG/DL (ref 2.4–5.1)
PHOSPHATE SERPL-MCNC: 6.1 MG/DL (ref 2.4–5.1)
PLATELET # BLD AUTO: 175 10(3)UL (ref 150–450)
PLATELET # BLD AUTO: 206 10(3)UL (ref 150–450)
PLATELET # BLD AUTO: 209 10(3)UL (ref 150–450)
PLATELET # BLD AUTO: 264 10(3)UL (ref 150–450)
PLATELET # BLD AUTO: 280 10(3)UL (ref 150–450)
PLATELET # BLD AUTO: 280 10(3)UL (ref 150–450)
PLATELET # BLD AUTO: 341 10(3)UL (ref 150–450)
PLATELET # BLD AUTO: 366 10(3)UL (ref 150–450)
PLATELET MORPHOLOGY: NORMAL
PLATELET MORPHOLOGY: NORMAL
PO2 BLDA: 190 MM HG (ref 80–100)
PO2 BLDA: 237 MM HG (ref 80–100)
PO2 BLDA: 279 MM HG (ref 80–100)
PO2 BLDA: 48 MM HG (ref 80–100)
PO2 BLDA: 60 MM HG (ref 80–100)
PO2 BLDA: 72 MM HG (ref 80–100)
PO2 BLDA: 95 MM HG (ref 80–100)
POTASSIUM SERPL-SCNC: 2.8 MMOL/L (ref 3.5–5.1)
POTASSIUM SERPL-SCNC: 2.8 MMOL/L (ref 3.5–5.1)
POTASSIUM SERPL-SCNC: 3 MMOL/L (ref 3.5–5.1)
POTASSIUM SERPL-SCNC: 3 MMOL/L (ref 3.5–5.1)
POTASSIUM SERPL-SCNC: 3.1 MMOL/L (ref 3.5–5.1)
POTASSIUM SERPL-SCNC: 3.1 MMOL/L (ref 3.5–5.1)
POTASSIUM SERPL-SCNC: 3.2 MMOL/L (ref 3.5–5.1)
POTASSIUM SERPL-SCNC: 3.2 MMOL/L (ref 3.5–5.1)
POTASSIUM SERPL-SCNC: 3.4 MMOL/L (ref 3.5–5.1)
POTASSIUM SERPL-SCNC: 3.5 MMOL/L (ref 3.5–5.1)
POTASSIUM SERPL-SCNC: 3.6 MMOL/L (ref 3.5–5.1)
POTASSIUM SERPL-SCNC: 3.9 MMOL/L (ref 3.5–5.1)
POTASSIUM SERPL-SCNC: 3.9 MMOL/L (ref 3.5–5.1)
POTASSIUM SERPL-SCNC: 4 MMOL/L (ref 3.5–5.1)
POTASSIUM SERPL-SCNC: 4.1 MMOL/L (ref 3.5–5.1)
POTASSIUM SERPL-SCNC: 4.1 MMOL/L (ref 3.5–5.1)
POTASSIUM SERPL-SCNC: 4.4 MMOL/L (ref 3.5–5.1)
POTASSIUM SERPL-SCNC: 4.5 MMOL/L (ref 3.5–5.1)
POTASSIUM SERPL-SCNC: 4.8 MMOL/L (ref 3.5–5.1)
POTASSIUM SERPL-SCNC: 4.9 MMOL/L (ref 3.5–5.1)
POTASSIUM SERPL-SCNC: 4.9 MMOL/L (ref 3.5–5.1)
POTASSIUM SERPL-SCNC: 5.4 MMOL/L (ref 3.5–5.1)
POTASSIUM SERPL-SCNC: 5.4 MMOL/L (ref 3.5–5.1)
POTASSIUM SERPL-SCNC: 5.5 MMOL/L (ref 3.5–5.1)
POTASSIUM SERPL-SCNC: 5.8 MMOL/L (ref 3.5–5.1)
POTASSIUM SERPL-SCNC: 5.9 MMOL/L (ref 3.5–5.1)
POTASSIUM SERPL-SCNC: 5.9 MMOL/L (ref 3.5–5.1)
POTASSIUM SERPL-SCNC: 6 MMOL/L (ref 3.5–5.1)
POTASSIUM UR-SCNC: 29.2 MMOL/L
PROCALCITONIN SERPL-MCNC: 0.68 NG/ML (ref ?–0.05)
PROT PATTERN CSF ELPH-IMP: 321.9 MG/DL (ref 15–45)
PROT SERPL-MCNC: 5.8 G/DL (ref 5.7–8.2)
PROT SERPL-MCNC: 5.9 G/DL (ref 5.7–8.2)
PROT SERPL-MCNC: 6 G/DL (ref 5.7–8.2)
PROT SERPL-MCNC: 6.4 G/DL (ref 5.7–8.2)
PROT SERPL-MCNC: 6.7 G/DL (ref 5.7–8.2)
PROT SERPL-MCNC: 8.1 G/DL (ref 5.7–8.2)
PROT SERPL-MCNC: 9 G/DL (ref 5.7–8.2)
PROT SERPL-MCNC: 9.3 G/DL (ref 5.7–8.2)
PROT SERPL-MCNC: 9.4 G/DL (ref 5.7–8.2)
PROT UR-MCNC: 20 MG/DL
PROT UR-MCNC: 20 MG/DL
PROT UR-MCNC: 70 MG/DL
PROTHROMBIN TIME: 13.9 SECONDS (ref 11.6–14.8)
PUNCTURE CHARGE: YES
Q-T INTERVAL: 318 MS
Q-T INTERVAL: 380 MS
Q-T INTERVAL: 412 MS
Q-T INTERVAL: 472 MS
QRS DURATION: 64 MS
QRS DURATION: 68 MS
QRS DURATION: 68 MS
QRS DURATION: 74 MS
QTC CALCULATION (BEZET): 447 MS
QTC CALCULATION (BEZET): 452 MS
QTC CALCULATION (BEZET): 561 MS
QTC CALCULATION (BEZET): 572 MS
R AXIS: 12 DEGREES
R AXIS: 46 DEGREES
R AXIS: 58 DEGREES
R AXIS: 66 DEGREES
RBC # BLD AUTO: 3.55 X10(6)UL
RBC # BLD AUTO: 3.59 X10(6)UL
RBC # BLD AUTO: 3.62 X10(6)UL
RBC # BLD AUTO: 3.66 X10(6)UL
RBC # BLD AUTO: 3.76 X10(6)UL
RBC # BLD AUTO: 4.49 X10(6)UL
RBC # BLD AUTO: 4.52 X10(6)UL
RBC # BLD AUTO: 4.54 X10(6)UL
RBC # CSF: 35 /CUMM (ref ?–1)
RBC #/AREA URNS AUTO: >10 /HPF
RBC #/AREA URNS AUTO: >10 /HPF
RESP RATE: 10 BPM
RESP RATE: 12 BPM
RESP RATE: 16 BPM
RHINOVIRUS/ENTERO PCR:: NOT DETECTED
RSV RNA SPEC QL NAA+PROBE: NOT DETECTED
SAO2 % BLDA: 79.2 % (ref 94–100)
SAO2 % BLDA: 95.2 % (ref 94–100)
SAO2 % BLDA: 96.3 % (ref 94–100)
SAO2 % BLDA: >99 % (ref 94–100)
SARS-COV-2 RNA NPH QL NAA+NON-PROBE: NOT DETECTED
SODIUM SERPL-SCNC: 100 MMOL/L
SODIUM SERPL-SCNC: 126 MMOL/L (ref 136–145)
SODIUM SERPL-SCNC: 128 MMOL/L (ref 136–145)
SODIUM SERPL-SCNC: 128 MMOL/L (ref 136–145)
SODIUM SERPL-SCNC: 129 MMOL/L (ref 136–145)
SODIUM SERPL-SCNC: 129 MMOL/L (ref 136–145)
SODIUM SERPL-SCNC: 130 MMOL/L (ref 136–145)
SODIUM SERPL-SCNC: 132 MMOL/L (ref 136–145)
SODIUM SERPL-SCNC: 134 MMOL/L (ref 136–145)
SODIUM SERPL-SCNC: 135 MMOL/L (ref 136–145)
SODIUM SERPL-SCNC: 140 MMOL/L (ref 136–145)
SODIUM SERPL-SCNC: 140 MMOL/L (ref 136–145)
SODIUM SERPL-SCNC: 144 MMOL/L (ref 136–145)
SODIUM SERPL-SCNC: 147 MMOL/L (ref 136–145)
SODIUM SERPL-SCNC: 147 MMOL/L (ref 136–145)
SODIUM SERPL-SCNC: 149 MMOL/L (ref 136–145)
SODIUM SERPL-SCNC: 152 MMOL/L (ref 136–145)
SODIUM SERPL-SCNC: 153 MMOL/L (ref 136–145)
SODIUM SERPL-SCNC: 154 MMOL/L (ref 136–145)
SODIUM SERPL-SCNC: 154 MMOL/L (ref 136–145)
SODIUM SERPL-SCNC: 156 MMOL/L (ref 136–145)
SODIUM SERPL-SCNC: 156 MMOL/L (ref 136–145)
SODIUM SERPL-SCNC: 158 MMOL/L (ref 136–145)
SODIUM SERPL-SCNC: 160 MMOL/L (ref 136–145)
SODIUM SERPL-SCNC: 163 MMOL/L (ref 136–145)
SODIUM SERPL-SCNC: 164 MMOL/L (ref 136–145)
SODIUM SERPL-SCNC: 164 MMOL/L (ref 136–145)
SP GR UR STRIP: 1.01 (ref 1–1.03)
SPECIMEN VOL 24H UR: 500 ML
STREP PNEUMO ANTIGEN, URINE: NEGATIVE
STREPTOCOCCUS AGALACTIAE: NOT DETECTED
STREPTOCOCCUS AGALACTIAE: NOT DETECTED
STREPTOCOCCUS PNEUMONIAE: NOT DETECTED
STREPTOCOCCUS PNEUMONIAE: NOT DETECTED
T AXIS: 79 DEGREES
T AXIS: 87 DEGREES
T AXIS: 95 DEGREES
T AXIS: 97 DEGREES
TCR G-D CELLS NFR SPEC: 1 %
TOTAL CELLS COUNTED BLD: 100
TOTAL CELLS COUNTED CSF: 160 /CUMM (ref 0–5)
TOTAL CELLS COUNTED FLD: 100
TOTAL VOLUME CSF: 21 ML
TOXO GONDII IGG AB: <3 IU/ML
TOXO GONDII IGM: <3 AU/ML
TRIGL SERPL-MCNC: 73 MG/DL (ref 30–149)
TRIGL SERPL-MCNC: 86 MG/DL (ref 30–149)
TRIGL SERPL-MCNC: 95 MG/DL (ref 30–149)
TUBE # CSF: 2
TURBIDITY CSF QL: CLEAR
UROBILINOGEN UR STRIP-ACNC: 2
UROBILINOGEN UR STRIP-ACNC: 2
UROBILINOGEN UR STRIP-ACNC: NORMAL
VANCOMYCIN TROUGH SERPL-MCNC: 15.5 UG/ML (ref 10–20)
VANCOMYCIN TROUGH SERPL-MCNC: 44.2 UG/ML (ref 10–20)
VARICELLA ZOSTER VIRUS (VZV): NOT DETECTED
VARICELLA ZOSTER VIRUS (VZV): NOT DETECTED
VDRL CSF: NON REACTIVE
VENTRICULAR RATE: 116 BPM
VENTRICULAR RATE: 119 BPM
VENTRICULAR RATE: 85 BPM
VENTRICULAR RATE: 85 BPM
VLDLC SERPL CALC-MCNC: 17 MG/DL (ref 0–30)
WBC # BLD AUTO: 11.3 X10(3) UL (ref 4–11)
WBC # BLD AUTO: 15.7 X10(3) UL (ref 4–11)
WBC # BLD AUTO: 3.9 X10(3) UL (ref 4–11)
WBC # BLD AUTO: 5.2 X10(3) UL (ref 4–11)
WBC # BLD AUTO: 5.4 X10(3) UL (ref 4–11)
WBC # BLD AUTO: 7 X10(3) UL (ref 4–11)
WBC # BLD AUTO: 7.5 X10(3) UL (ref 4–11)
WBC # BLD AUTO: 7.7 X10(3) UL (ref 4–11)
WBC CALC (IRIS) CSF: 160 /CUMM
WNV IGG CSF: NEGATIVE
WNV IGG CSF: NEGATIVE
WNV IGG: NEGATIVE
WNV IGG: NEGATIVE
WNV IGM CSF: NEGATIVE
WNV IGM CSF: NEGATIVE
WNV IGM: NEGATIVE
WNV IGM: NEGATIVE

## 2024-01-01 PROCEDURE — 71045 X-RAY EXAM CHEST 1 VIEW: CPT | Performed by: EMERGENCY MEDICINE

## 2024-01-01 PROCEDURE — B548ZZA ULTRASONOGRAPHY OF SUPERIOR VENA CAVA, GUIDANCE: ICD-10-PCS | Performed by: INTERNAL MEDICINE

## 2024-01-01 PROCEDURE — 71045 X-RAY EXAM CHEST 1 VIEW: CPT | Performed by: NURSE PRACTITIONER

## 2024-01-01 PROCEDURE — 71045 X-RAY EXAM CHEST 1 VIEW: CPT | Performed by: INTERNAL MEDICINE

## 2024-01-01 PROCEDURE — 009U3ZX DRAINAGE OF SPINAL CANAL, PERCUTANEOUS APPROACH, DIAGNOSTIC: ICD-10-PCS | Performed by: OTHER

## 2024-01-01 PROCEDURE — 99291 CRITICAL CARE FIRST HOUR: CPT | Performed by: OTHER

## 2024-01-01 PROCEDURE — 36410 VNPNXR 3YR/> PHY/QHP DX/THER: CPT | Performed by: NURSE PRACTITIONER

## 2024-01-01 PROCEDURE — 74018 RADEX ABDOMEN 1 VIEW: CPT | Performed by: NURSE PRACTITIONER

## 2024-01-01 PROCEDURE — 3E033XZ INTRODUCTION OF VASOPRESSOR INTO PERIPHERAL VEIN, PERCUTANEOUS APPROACH: ICD-10-PCS | Performed by: INTERNAL MEDICINE

## 2024-01-01 PROCEDURE — 99233 SBSQ HOSP IP/OBS HIGH 50: CPT | Performed by: OTHER

## 2024-01-01 PROCEDURE — 70544 MR ANGIOGRAPHY HEAD W/O DYE: CPT | Performed by: OTHER

## 2024-01-01 PROCEDURE — 95720 EEG PHY/QHP EA INCR W/VEEG: CPT | Performed by: INTERNAL MEDICINE

## 2024-01-01 PROCEDURE — 76705 ECHO EXAM OF ABDOMEN: CPT | Performed by: INTERNAL MEDICINE

## 2024-01-01 PROCEDURE — 70496 CT ANGIOGRAPHY HEAD: CPT | Performed by: EMERGENCY MEDICINE

## 2024-01-01 PROCEDURE — 62270 DX LMBR SPI PNXR: CPT | Performed by: OTHER

## 2024-01-01 PROCEDURE — 70450 CT HEAD/BRAIN W/O DYE: CPT | Performed by: EMERGENCY MEDICINE

## 2024-01-01 PROCEDURE — 70450 CT HEAD/BRAIN W/O DYE: CPT | Performed by: OTHER

## 2024-01-01 PROCEDURE — 0BH17EZ INSERTION OF ENDOTRACHEAL AIRWAY INTO TRACHEA, VIA NATURAL OR ARTIFICIAL OPENING: ICD-10-PCS | Performed by: INTERNAL MEDICINE

## 2024-01-01 PROCEDURE — 70551 MRI BRAIN STEM W/O DYE: CPT | Performed by: OTHER

## 2024-01-01 PROCEDURE — 95718 EEG PHYS/QHP 2-12 HR W/VEEG: CPT | Performed by: INTERNAL MEDICINE

## 2024-01-01 PROCEDURE — 02HV33Z INSERTION OF INFUSION DEVICE INTO SUPERIOR VENA CAVA, PERCUTANEOUS APPROACH: ICD-10-PCS | Performed by: INTERNAL MEDICINE

## 2024-01-01 PROCEDURE — 5A1955Z RESPIRATORY VENTILATION, GREATER THAN 96 CONSECUTIVE HOURS: ICD-10-PCS | Performed by: INTERNAL MEDICINE

## 2024-01-01 PROCEDURE — 70549 MR ANGIOGRAPH NECK W/O&W/DYE: CPT | Performed by: OTHER

## 2024-01-01 PROCEDURE — 31500 INSERT EMERGENCY AIRWAY: CPT | Performed by: INTERNAL MEDICINE

## 2024-01-01 PROCEDURE — 70553 MRI BRAIN STEM W/O & W/DYE: CPT | Performed by: OTHER

## 2024-01-01 RX ORDER — NICOTINE POLACRILEX 4 MG
30 LOZENGE BUCCAL
Status: DISCONTINUED | OUTPATIENT
Start: 2024-01-01 | End: 2024-01-01

## 2024-01-01 RX ORDER — SODIUM CHLORIDE 9 MG/ML
INJECTION, SOLUTION INTRAVENOUS CONTINUOUS
Status: DISCONTINUED | OUTPATIENT
Start: 2024-01-01 | End: 2024-01-01

## 2024-01-01 RX ORDER — LORAZEPAM 2 MG/ML
1 INJECTION INTRAMUSCULAR ONCE
Status: COMPLETED | OUTPATIENT
Start: 2024-01-01 | End: 2024-01-01

## 2024-01-01 RX ORDER — FAMOTIDINE 10 MG/ML
20 INJECTION, SOLUTION INTRAVENOUS 2 TIMES DAILY
Status: DISCONTINUED | OUTPATIENT
Start: 2024-01-01 | End: 2024-01-01

## 2024-01-01 RX ORDER — METRONIDAZOLE 500 MG/100ML
500 INJECTION, SOLUTION INTRAVENOUS EVERY 8 HOURS
Status: DISCONTINUED | OUTPATIENT
Start: 2024-01-01 | End: 2024-01-01

## 2024-01-01 RX ORDER — GADOTERATE MEGLUMINE 376.9 MG/ML
15 INJECTION INTRAVENOUS
Status: COMPLETED | OUTPATIENT
Start: 2024-01-01 | End: 2024-01-01

## 2024-01-01 RX ORDER — LEVETIRACETAM 500 MG/5ML
1500 INJECTION, SOLUTION, CONCENTRATE INTRAVENOUS ONCE
Status: DISCONTINUED | OUTPATIENT
Start: 2024-01-01 | End: 2024-01-01

## 2024-01-01 RX ORDER — CHLORHEXIDINE GLUCONATE ORAL RINSE 1.2 MG/ML
15 SOLUTION DENTAL
Status: DISCONTINUED | OUTPATIENT
Start: 2024-01-01 | End: 2024-01-01

## 2024-01-01 RX ORDER — VANCOMYCIN HYDROCHLORIDE 50 MG/ML
125 KIT ORAL DAILY
Status: DISCONTINUED | OUTPATIENT
Start: 2024-01-01 | End: 2024-01-01

## 2024-01-01 RX ORDER — LORAZEPAM 2 MG/ML
2 INJECTION INTRAMUSCULAR EVERY 10 MIN PRN
Status: DISCONTINUED | OUTPATIENT
Start: 2024-01-01 | End: 2024-01-01

## 2024-01-01 RX ORDER — ENEMA 19; 7 G/133ML; G/133ML
1 ENEMA RECTAL ONCE AS NEEDED
Status: DISCONTINUED | OUTPATIENT
Start: 2024-01-01 | End: 2024-01-01

## 2024-01-01 RX ORDER — HEPARIN SODIUM 5000 [USP'U]/ML
5000 INJECTION, SOLUTION INTRAVENOUS; SUBCUTANEOUS EVERY 12 HOURS
Status: DISCONTINUED | OUTPATIENT
Start: 2024-01-01 | End: 2024-01-01

## 2024-01-01 RX ORDER — LORAZEPAM 2 MG/ML
INJECTION INTRAMUSCULAR
Status: DISCONTINUED
Start: 2024-01-01 | End: 2024-01-01

## 2024-01-01 RX ORDER — DEXTROSE MONOHYDRATE 50 MG/ML
INJECTION, SOLUTION INTRAVENOUS CONTINUOUS
Status: DISCONTINUED | OUTPATIENT
Start: 2024-01-01 | End: 2024-01-01

## 2024-01-01 RX ORDER — ACETAMINOPHEN 650 MG/1
650 SUPPOSITORY RECTAL ONCE
Status: COMPLETED | OUTPATIENT
Start: 2024-01-01 | End: 2024-01-01

## 2024-01-01 RX ORDER — BISACODYL 10 MG
10 SUPPOSITORY, RECTAL RECTAL
Status: DISCONTINUED | OUTPATIENT
Start: 2024-01-01 | End: 2024-01-01

## 2024-01-01 RX ORDER — POLYETHYLENE GLYCOL 3350 17 G/17G
17 POWDER, FOR SOLUTION ORAL DAILY PRN
Status: DISCONTINUED | OUTPATIENT
Start: 2024-01-01 | End: 2024-01-01

## 2024-01-01 RX ORDER — ACETAMINOPHEN 10 MG/ML
1000 INJECTION, SOLUTION INTRAVENOUS EVERY 6 HOURS PRN
Status: DISCONTINUED | OUTPATIENT
Start: 2024-01-01 | End: 2024-01-01

## 2024-01-01 RX ORDER — LORAZEPAM 2 MG/ML
INJECTION INTRAMUSCULAR
Status: COMPLETED
Start: 2024-01-01 | End: 2024-01-01

## 2024-01-01 RX ORDER — SODIUM BICARBONATE 650 MG/1
325 TABLET ORAL AS NEEDED
Status: DISCONTINUED | OUTPATIENT
Start: 2024-01-01 | End: 2024-01-01

## 2024-01-01 RX ORDER — SEVELAMER CARBONATE 800 MG/1
800 TABLET, FILM COATED ORAL
Status: DISCONTINUED | OUTPATIENT
Start: 2024-01-01 | End: 2024-01-01

## 2024-01-01 RX ORDER — SODIUM CHLORIDE, SODIUM LACTATE, POTASSIUM CHLORIDE, CALCIUM CHLORIDE 600; 310; 30; 20 MG/100ML; MG/100ML; MG/100ML; MG/100ML
INJECTION, SOLUTION INTRAVENOUS CONTINUOUS
Status: DISCONTINUED | OUTPATIENT
Start: 2024-01-01 | End: 2024-01-01

## 2024-01-01 RX ORDER — POTASSIUM CHLORIDE 14.9 MG/ML
20 INJECTION INTRAVENOUS ONCE
Status: COMPLETED | OUTPATIENT
Start: 2024-01-01 | End: 2024-01-01

## 2024-01-01 RX ORDER — LORAZEPAM 2 MG/ML
4 INJECTION INTRAMUSCULAR ONCE AS NEEDED
Status: COMPLETED | OUTPATIENT
Start: 2024-01-01 | End: 2024-01-01

## 2024-01-01 RX ORDER — DEXTROSE MONOHYDRATE 50 MG/ML
INJECTION, SOLUTION INTRAVENOUS CONTINUOUS
Status: ACTIVE | OUTPATIENT
Start: 2024-01-01 | End: 2024-01-01

## 2024-01-01 RX ORDER — LABETALOL HYDROCHLORIDE 5 MG/ML
10 INJECTION, SOLUTION INTRAVENOUS ONCE
Status: COMPLETED | OUTPATIENT
Start: 2024-01-01 | End: 2024-01-01

## 2024-01-01 RX ORDER — MORPHINE SULFATE 4 MG/ML
INJECTION, SOLUTION INTRAMUSCULAR; INTRAVENOUS
Status: COMPLETED
Start: 2024-01-01 | End: 2024-01-01

## 2024-01-01 RX ORDER — PROCHLORPERAZINE EDISYLATE 5 MG/ML
5 INJECTION INTRAMUSCULAR; INTRAVENOUS EVERY 8 HOURS PRN
Status: DISCONTINUED | OUTPATIENT
Start: 2024-01-01 | End: 2024-01-01

## 2024-01-01 RX ORDER — MAGNESIUM SULFATE HEPTAHYDRATE 40 MG/ML
2 INJECTION, SOLUTION INTRAVENOUS ONCE
Status: COMPLETED | OUTPATIENT
Start: 2024-01-01 | End: 2024-01-01

## 2024-01-01 RX ORDER — MIDAZOLAM HYDROCHLORIDE 1 MG/ML
2 INJECTION INTRAMUSCULAR; INTRAVENOUS ONCE AS NEEDED
Status: ACTIVE | OUTPATIENT
Start: 2024-01-01 | End: 2024-01-01

## 2024-01-01 RX ORDER — HYDRALAZINE HYDROCHLORIDE 20 MG/ML
10 INJECTION INTRAMUSCULAR; INTRAVENOUS EVERY 6 HOURS PRN
Status: DISCONTINUED | OUTPATIENT
Start: 2024-01-01 | End: 2024-01-01

## 2024-01-01 RX ORDER — METRONIDAZOLE 500 MG/100ML
500 INJECTION, SOLUTION INTRAVENOUS EVERY 6 HOURS
Status: DISCONTINUED | OUTPATIENT
Start: 2024-01-01 | End: 2024-01-01

## 2024-01-01 RX ORDER — DEXTROSE MONOHYDRATE 25 G/50ML
50 INJECTION, SOLUTION INTRAVENOUS
Status: DISCONTINUED | OUTPATIENT
Start: 2024-01-01 | End: 2024-01-01

## 2024-01-01 RX ORDER — MORPHINE SULFATE 2 MG/ML
2 INJECTION, SOLUTION INTRAMUSCULAR; INTRAVENOUS EVERY 2 HOUR PRN
Status: DISCONTINUED | OUTPATIENT
Start: 2024-01-01 | End: 2024-01-01

## 2024-01-01 RX ORDER — LABETALOL HYDROCHLORIDE 5 MG/ML
10 INJECTION, SOLUTION INTRAVENOUS EVERY 6 HOURS PRN
Status: DISCONTINUED | OUTPATIENT
Start: 2024-01-01 | End: 2024-01-01

## 2024-01-01 RX ORDER — DEXMEDETOMIDINE HYDROCHLORIDE 4 UG/ML
INJECTION, SOLUTION INTRAVENOUS CONTINUOUS
Status: DISCONTINUED | OUTPATIENT
Start: 2024-01-01 | End: 2024-01-01

## 2024-01-01 RX ORDER — MORPHINE SULFATE 2 MG/ML
1 INJECTION, SOLUTION INTRAMUSCULAR; INTRAVENOUS EVERY 2 HOUR PRN
Status: DISCONTINUED | OUTPATIENT
Start: 2024-01-01 | End: 2024-01-01

## 2024-01-01 RX ORDER — ASPIRIN 300 MG/1
300 SUPPOSITORY RECTAL DAILY
Status: DISCONTINUED | OUTPATIENT
Start: 2024-01-01 | End: 2024-01-01

## 2024-01-01 RX ORDER — ASPIRIN 81 MG/1
324 TABLET, CHEWABLE ORAL DAILY
Status: DISCONTINUED | OUTPATIENT
Start: 2024-01-01 | End: 2024-01-01

## 2024-01-01 RX ORDER — LIDOCAINE HYDROCHLORIDE 10 MG/ML
5 INJECTION, SOLUTION EPIDURAL; INFILTRATION; INTRACAUDAL; PERINEURAL
Status: COMPLETED | OUTPATIENT
Start: 2024-01-01 | End: 2024-01-01

## 2024-01-01 RX ORDER — LORAZEPAM 2 MG/ML
2 INJECTION INTRAMUSCULAR ONCE
Status: COMPLETED | OUTPATIENT
Start: 2024-01-01 | End: 2024-01-01

## 2024-01-01 RX ORDER — LEVETIRACETAM 500 MG/5ML
1000 INJECTION, SOLUTION, CONCENTRATE INTRAVENOUS ONCE
Status: COMPLETED | OUTPATIENT
Start: 2024-01-01 | End: 2024-01-01

## 2024-01-01 RX ORDER — MORPHINE SULFATE 4 MG/ML
4 INJECTION, SOLUTION INTRAMUSCULAR; INTRAVENOUS EVERY 2 HOUR PRN
Status: DISCONTINUED | OUTPATIENT
Start: 2024-01-01 | End: 2024-01-01

## 2024-01-01 RX ORDER — ACETAMINOPHEN 500 MG
500 TABLET ORAL EVERY 4 HOURS PRN
Status: DISCONTINUED | OUTPATIENT
Start: 2024-01-01 | End: 2024-01-01

## 2024-01-01 RX ORDER — LEVETIRACETAM 500 MG/5ML
500 INJECTION, SOLUTION, CONCENTRATE INTRAVENOUS EVERY 12 HOURS
Status: DISCONTINUED | OUTPATIENT
Start: 2024-01-01 | End: 2024-01-01

## 2024-01-01 RX ORDER — MAGNESIUM OXIDE 400 MG/1
400 TABLET ORAL ONCE
Status: COMPLETED | OUTPATIENT
Start: 2024-01-01 | End: 2024-01-01

## 2024-01-01 RX ORDER — METOPROLOL TARTRATE 1 MG/ML
INJECTION, SOLUTION INTRAVENOUS
Status: DISCONTINUED
Start: 2024-01-01 | End: 2024-01-01

## 2024-01-01 RX ORDER — METOPROLOL TARTRATE 1 MG/ML
5 INJECTION, SOLUTION INTRAVENOUS ONCE
Status: COMPLETED | OUTPATIENT
Start: 2024-01-01 | End: 2024-01-01

## 2024-01-01 RX ORDER — SENNOSIDES 8.8 MG/5ML
10 LIQUID ORAL NIGHTLY PRN
Status: DISCONTINUED | OUTPATIENT
Start: 2024-01-01 | End: 2024-01-01

## 2024-01-01 RX ORDER — NICOTINE POLACRILEX 4 MG
15 LOZENGE BUCCAL
Status: DISCONTINUED | OUTPATIENT
Start: 2024-01-01 | End: 2024-01-01

## 2024-01-01 RX ORDER — FAMOTIDINE 10 MG/ML
20 INJECTION, SOLUTION INTRAVENOUS DAILY
Status: DISCONTINUED | OUTPATIENT
Start: 2024-01-01 | End: 2024-01-01

## 2024-01-01 RX ORDER — ONDANSETRON 2 MG/ML
4 INJECTION INTRAMUSCULAR; INTRAVENOUS EVERY 6 HOURS PRN
Status: DISCONTINUED | OUTPATIENT
Start: 2024-01-01 | End: 2024-01-01

## 2024-06-27 PROBLEM — E87.1 HYPONATREMIA: Status: ACTIVE | Noted: 2024-01-01

## 2024-06-27 PROBLEM — G04.90 ENCEPHALITIS (HCC): Status: ACTIVE | Noted: 2024-01-01

## 2024-06-27 PROBLEM — R56.9 NEW ONSET SEIZURE (HCC): Status: ACTIVE | Noted: 2024-01-01

## 2024-06-27 PROBLEM — R41.0 DELIRIUM: Status: ACTIVE | Noted: 2024-01-01

## 2024-06-27 PROBLEM — E87.6 HYPOKALEMIA: Status: ACTIVE | Noted: 2024-01-01

## 2024-06-27 NOTE — ED PROVIDER NOTES
Patient Seen in: [facility] Emergency Department      History     Chief Complaint   Patient presents with    Hypertension     Stated Complaint: high bp, altered mental status    Subjective:   HPI    The patient is a 43-year-old female with no significant past medical history brought by her boyfriend for 1 week of intermittent headaches and today confused and not acting normal.  When patient was in triage she had witnessed seizure activity and was brought directly back.  Patient was given Ativan and then was postictal and unable to give history.  Boyfriend states that she went to another hospital twice over the last few days and had a negative CT scan but he is unsure what else they did.  Her blood pressure has been intermittently elevated as well.  He does not live with her and so is unsure what other symptoms she has had.  When she sounded strange on the phone today he went and picked her up to bring her here for another evaluation.  Afebrile on arrival.  He denies any alcohol or drug use to his knowledge    Objective:   History reviewed. No pertinent past medical history.           History reviewed. No pertinent surgical history.             No pertinent social history.            Review of Systems    Positive for stated Chief Complaint: Hypertension    Other systems are as noted in HPI.  Constitutional and vital signs reviewed.      All other systems reviewed and negative except as noted above.    Physical Exam     ED Triage Vitals   BP 06/27/24 0041 (!) 176/78   Pulse 06/27/24 0041 99   Resp 06/27/24 0041 18   Temp 06/27/24 0041 98.2 °F (36.8 °C)   Temp src 06/27/24 0041 Temporal   SpO2 06/27/24 0041 99 %   O2 Device 06/27/24 0145 None (Room air)       Current Vitals:   Vital Signs  BP: (!) 168/87  Pulse: 94  Resp: 25  Temp: (!) 101.1 °F (38.4 °C)  Temp src: Temporal  MAP (mmHg): (!) 111    Oxygen Therapy  SpO2: 97 %  O2 Device: Nasal cannula  O2 Flow Rate (L/min): 2 L/min            Physical  Exam  Vitals and nursing note reviewed.   Constitutional:       General: She is not in acute distress.     Appearance: She is well-developed. She is not ill-appearing.      Comments: Postictal moving all extremities not following commands   HENT:      Head: Normocephalic and atraumatic.      Mouth/Throat:      Mouth: Mucous membranes are moist.      Pharynx: Oropharynx is clear.      Comments: Positive gag  Eyes:      Conjunctiva/sclera: Conjunctivae normal.      Pupils: Pupils are equal, round, and reactive to light.   Neck:      Vascular: No JVD.   Cardiovascular:      Rate and Rhythm: Regular rhythm. Tachycardia present.      Heart sounds: Normal heart sounds. No murmur heard.  Pulmonary:      Effort: Pulmonary effort is normal.      Breath sounds: Normal breath sounds.   Abdominal:      General: Bowel sounds are normal. There is no distension.      Palpations: Abdomen is soft. There is no mass.      Tenderness: There is no abdominal tenderness. There is no guarding or rebound.   Musculoskeletal:         General: No signs of injury. Normal range of motion.      Cervical back: Normal range of motion and neck supple. No rigidity.   Skin:     General: Skin is warm and dry.      Capillary Refill: Capillary refill takes less than 2 seconds.      Findings: No petechiae or rash.   Neurological:      Mental Status: She is lethargic.      Deep Tendon Reflexes: Reflexes are normal and symmetric.      Comments: Moving all extremities but not following commands       Differential diagnosis includes but is not limited to stroke, brain tumor, new onset seizure, CNS infection including meningitis and/or encephalitis        ED Course     Labs Reviewed   BASIC METABOLIC PANEL (8) - Abnormal; Notable for the following components:       Result Value    Glucose 135 (*)     Sodium 130 (*)     Potassium 3.0 (*)     Chloride 95 (*)     BUN 6 (*)     Calculated Osmolality 270 (*)     All other components within normal limits   HEPATIC  FUNCTION PANEL (7) - Abnormal; Notable for the following components:    AST 76 (*)     ALT 56 (*)     Alkaline Phosphatase 532 (*)     Bilirubin, Total 1.4 (*)     Bilirubin, Direct 0.6 (*)     Total Protein 9.0 (*)     All other components within normal limits   DRUG ABUSE PANEL 11 SCREEN - Abnormal; Notable for the following components:    Cannabinoid Urine Presumed Positive (*)     All other components within normal limits   URINALYSIS, ROUTINE - Abnormal; Notable for the following components:    Glucose Urine 30 (*)     Blood Urine 3+ (*)     Protein Urine 70 (*)     Urobilinogen Urine 2 (*)     RBC Urine >10 (*)     Squamous Epi. Cells Few (*)     All other components within normal limits   POCT GLUCOSE - Abnormal; Notable for the following components:    POC Glucose  158 (*)     All other components within normal limits   CBC W/ DIFFERENTIAL - Abnormal; Notable for the following components:    HGB 11.6 (*)     MCH 25.8 (*)     RDW 15.1 (*)     Lymphocyte Absolute 0.74 (*)     All other components within normal limits   ETHYL ALCOHOL - Normal   LACTIC ACID, PLASMA - Normal   CBC WITH DIFFERENTIAL WITH PLATELET    Narrative:     The following orders were created for panel order CBC With Differential With Platelet.  Procedure                               Abnormality         Status                     ---------                               -----------         ------                     CBC W/ DIFFERENTIAL[[id number]]          Abnormal            Final result                 Please view results for these tests on the individual orders.   PROTEIN, TOTAL, CSF   GLUCOSE, CEREBROSPINAL FLUID   CELL COUNT, CSF   MENINGITIS ENCEPHALITIS PANEL BY PCR   ARBOVIRUS/WEST NILE AB PNL,CSF   VDRL, CSF   RAINBOW DRAW LAVENDER   RAINBOW DRAW LIGHT GREEN   RAINBOW DRAW BLUE   RAINBOW DRAW GOLD   BLOOD CULTURE   BLOOD CULTURE   CSF CULTURE   MRSA SCREEN BY PCR     EKG    Rate, intervals and axes as noted on EKG Report.  Rate:  85  Rhythm: Sinus Rhythm  Reading: Sinus no ST elevation                            MDM        Pulse ox 98% on room air, normal  Admission disposition: 6/27/2024  4:19 AM                                        Medical Decision Making  Patient with altered mental status by history and new onset seizure initially afebrile, CT scan normal, consulted neurology patient remained agitated and then spiked temperature to 102.  Antibiotics and antivirals started in the ED, attempted LP but unable as patient agitated treatment started and ordered under fluoroscopy neurology in agreement  Patient continued to maintain her airway with positive gag and normal oxygenation vital signs stable prior to admission  Patient also given Keppra for seizure prophylaxis  Intermittent Ativan for agitation  Suspect encephalitis versus viral meningitis  CTA and CTV per neurology also negative    Problems Addressed:  Delirium: acute illness or injury  Hypokalemia: acute illness or injury  Hyponatremia: acute illness or injury  New onset seizure (HCC): acute illness or injury that poses a threat to life or bodily functions    Amount and/or Complexity of Data Reviewed  Independent Historian: friend  Labs: ordered.  Radiology: ordered and independent interpretation performed.     Details: No intracranial hemorrhage other results per radiologist  ECG/medicine tests: ordered and independent interpretation performed. Decision-making details documented in ED Course.  Discussion of management or test interpretation with external provider(s): Case discussed with madeline hospitalist and neurology Dr. Lee Vasques  Parenteral controlled substances.  Decision regarding hospitalization.    Critical Care  Total time providing critical care: minutes (A total of 75 minutes of critical care time (exclusive of billable procedures) was administered to manage the patient's neurologic instability due to her new onset seizure and altered mental status.  This involved  direct patient intervention, complex decision making, and/or extensive discussions with the patient, family, and clinical staff.  )        Disposition and Plan     Clinical Impression:  1. Delirium    2. New onset seizure (HCC)    3. Hyponatremia    4. Hypokalemia         Disposition:  Admit  6/27/2024  4:19 am    Follow-up:  No follow-up provider specified.        Medications Prescribed:  There are no discharge medications for this patient.                        Hospital Problems       Present on Admission             ICD-10-CM Noted POA    * (Principal) Delirium R41.0 6/27/2024 Unknown    Hypokalemia E87.6 6/27/2024 Unknown    Hyponatremia E87.1 6/27/2024 Unknown    New onset seizure (HCC) R56.9 6/27/2024 Unknown

## 2024-06-27 NOTE — ED QUICK NOTES
Orders for admission, patient is aware of plan and ready to go upstairs. Any questions, please call ED RN Jasper at extension [phone].     Patient Covid vaccination status: Unvaccinated     COVID Test Ordered in ED: None    COVID Suspicion at Admission: N/A    Running Infusions:      Mental Status/LOC at time of transport: AMS/RESPONSIVE TO PAIN/UNABLE TO BE REDIRECTABLE    Other pertinent information:   CIWA score: N/A   NIH score:  N/A

## 2024-06-27 NOTE — CONSULTS
[facility] Pulmonary, Critical Care and Sleep    Calos Hernandez Patient Status:  Inpatient    1980 MRN [id number]   Location -A PCP No primary care provider on file.     Date of Admission: 2024    History of Present Illness: Pt is a 43 year old female consulted for CC management with no signfiicant PMH. Per family pt has had  a headache for the last week and had been seen in [place] ED 3 times. They report CT head and was treated with ibuprofen. Pt had continued headache and balance problems, with blurry vision. This continued and family brought her to [facility] for evaluation. Pt had witnessed seizure activity in triage and was given ativan. Pt was given keppra.   Family do note poor dentition with damaged tooth for the last year.   Pt does smoke marijuana. No other illicits.   No travel or sick contacts.   Has a bird at home    PMH:  History reviewed. No pertinent past medical history.  History reviewed. No pertinent surgical history.    Allergies: Not on File    Medications:  Outpatient Medications:    No current facility-administered medications on file prior to encounter.     No current outpatient medications on file prior to encounter.       Inpatient Medications:  Scheduled Medications:     heparin  5,000 Units Subcutaneous q12h    levETIRAcetam  1,500 mg Intravenous Once    cefTRIAXone  2 g Intravenous q12h    acyclovir  10 mg/kg Intravenous Q8H    vancomycin  1,000 mg Intravenous Q12H    sodium chloride  1,000 mL Intravenous Once     Infusing Medications:     sodium chloride 83 mL/hr at 24 0755    dexmedetomidine       PRN Medications:    acetaminophen    ondansetron    prochlorperazine    Social History:    History   Smoking Status    Not on file   Smokeless Tobacco    Not on file       History   Alcohol use Not on file       History   Drug Use Not on file     Work:Dietary at health care facility.   TB: No  Asbestos: No    No family history on file.   REVIEW OF SYSTEMS:   A  comprehensive 10 point review of systems was completed.  Pertinent positives and negatives noted in the the HPI.    OBJECTIVE:  Temp (24hrs), Av °F (37.8 °C), Min:98.2 °F (36.8 °C), Max:102.5 °F (39.2 °C)   /83 (BP Location: Left arm)   Pulse 100   Temp 99.4 °F (37.4 °C) (Temporal)   Resp 25   Ht 170.2 cm (5' 7\")   Wt 121 lb 4.1 oz (55 kg)   LMP 2024   SpO2 97%   BMI 18.99 kg/m²   O2: 2 LNC  General: NAD.   Neuro: Obtunded, no focal deficits.  Does gag and cough with tongue blade. Poor dentition. Micrognanthia..   HEENT: PERRL  Neck : No LAD  CV: RRR, nl S1, S2, no S4, S3 or murmur.   Lungs: Clear bilaterlly.   Abd: Nontender, non distended.   Ext: No edema.   Skin: No rashes.     Labs:  Recent Labs   Lab 24  0123 24  0717   WBC 7.7 7.5   HGB 11.6* 11.7*   HCT 36.2 36.1   .0 366.0     Recent Labs   Lab 24  0123 24  0717   * 119*   BUN 6*  --    CREATSERUM 0.60  --    CA 9.2  --    *  --    K 3.0*  --    CL 95*  --    CO2 21.0  --    AST 76*  --    ALT 56*  --    ALB 4.2  --      No results for input(s): \"INR\", \"PTT\" in the last 168 hours.  No results for input(s): \"ABGPHT\", \"IWRBAM2X\", \"WGSVA5J\", \"ABGHCO3\", \"SITE\", \"DEV\", \"THGB\" in the last 168 hours.    COVID-19 Lab Results    COVID-19  No results found for: \"COVID19\"    Pro-Calcitonin  No results for input(s): \"PCT\" in the last 168 hours.    Cardiac  No results for input(s): \"TROP\", \"PBNP\" in the last 168 hours.    Creatinine Kinase  No results for input(s): \"CK\" in the last 168 hours.    Inflammatory Markers  No results for input(s): \"CRP\", \"DIEGO\", \"LDH\", \"DDIMER\" in the last 168 hours.      Imaging:    CXR 2024      Chest images personally reviewed.     CT head 2024  1. No acute intracranial finding.   2. Small probably chronic left basal ganglionic lacunar infarct related to small vessel disease.   3. Divergent gaze.   4. No major discrepancy with preliminary [facility] radiology report.        CTA head 6/27/2024  1. Negative CTA and CTV of brain.       Assessment/Plan   Respiratory insufficiency secondary to likely meningitis. Possble aspiraton meningitis.   O2 protocol   Monitor airway protection.   So far looks OK. But if worsens or for procedures may require intubation and mechanical ventilation.   Neuro  Suspect meningtitis with fever and constellation of sx.   Neuro following. MRI and LP pending.   Precedex and ativan as needed.   AED per neuro.   ID: Suspect meningtitis/viral or bacterial  ?oral source.   ID consult.   Vanc/acylovir/rocephin  Proph  Heparin subcutaneous  Dispo  Full code. Discussed with family. ICU monitoring.     Critical Care Time greater than: 35 minutes    My best regards,         Adriel Tyson MD  [facility] Pulmonary, Critical Care and Sleep Medicine

## 2024-06-27 NOTE — PLAN OF CARE
Restraints initiated for patient safety.       Problem: Safety Risk - Non-Violent Restraints  Goal: Patient will remain free from self-harm  Description: INTERVENTIONS:  - Apply the least restrictive restraint to prevent harm  - Notify patient and family of reasons restraints applied  - Assess for any contributing factors to confusion (electrolyte disturbances, delirium, medications)  - Discontinue any unnecessary medical devices as soon as possible  - Assess the patient's physical comfort, circulation, skin condition, hydration, nutrition and elimination needs   - Reorient and redirection as needed  - Assess for the need to continue restraints  Outcome: Not Progressing

## 2024-06-27 NOTE — ED INITIAL ASSESSMENT (HPI)
Pt c/o headache, htn, irritability for the last week.  Multiple ER visits for the same over the last week.

## 2024-06-27 NOTE — H&P
[facility] Hospitalist H&P       CC: altered mental status    PCP: No primary care provider on file.    History of Present Illness:   43-year-old female with no known medical history, who presents to the hospital for evaluation of headaches and feeling ill.  Here at bedside is her brother, sister and niece.  Patient is unable to give history at this time due to altered mental status.  It appears that the patient has been more irritable recently and has had ER visits as well, mainly for headaches.  Here in the hospital she is febrile.  Hard to say if she was febrile prior to admission as the family is not sure if she took her temperature.  The family also states she has been complaining of tooth pain.  She has no history of seizure but did have a seizure here in the waiting room of the hospital.    Review of Systems  Comprehensive ROS reviewed and negative except for what's stated above.     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 Patient keeps eyes closed. Patient restless and spontaneously moving all extremities.     Diagnostic Data:    CBC/Chem  Recent Labs   Lab 06/27/24  0123   WBC 7.7    HGB 11.6*   MCV 80.6   .0       Recent Labs   Lab 06/27/24  0123 06/27/24  0717   *  --    K 3.0*  --    CL 95*  --    CO2 21.0  --    BUN 6*  --    CREATSERUM 0.60  --    * 119*   CA 9.2  --        Recent Labs   Lab 06/27/24  0123   ALT 56*   AST 76*   ALB 4.2     Radiology: CT BRAIN OR HEAD ([id number])    Result Date: 6/27/2024  CONCLUSION:  1. No acute intracranial finding. 2. Small probably chronic left basal ganglionic lacunar infarct related to small vessel disease. 3. Divergent gaze. 4. No major discrepancy with preliminary [facility] radiology report.    Dictated by (CST): Rodriguez Stoner MD on 6/27/2024 at 6:30 AM     Finalized by (CST): Rodriguez Stoner MD on 6/27/2024 at 6:34 AM           ASSESSMENT / PLAN:   43-year-old female with no known medical history, who presents to the hospital for evaluation of headaches and feeling ill.      Sepsis  Encephalopathy  Concern for meningitis/encephalitis  -source unclear, poor dentition noted, tooth pain this week? Headaches prior to admission  -needs strep coverage  -on rocephin, vancomycin, acyclovir  -will need LP  -give fluid bolus now this AM, due to concern of sepsis. She is tachypneic, febrile. CXR is clear   -continue rate of fluids (she is also on acyclovir, for renal protection).   -critical care, ID, and neuro consult    Acute respiratory failure  -obtain blood gas now  -airway protection may become issue. Monitor saturations closely  -discussed with ICU attending  -will evaluate blood gas     Seizure  -now on keppra  -EEG monitoring  -neuro consult     Tachycardia  -sinus tachycardia, secondary to sepsis     Elevated liver enzymes  -alk phos high  -bili high  -borderline ast/alt  -repeat CMP this AM  -may need ultrasound    Hyponatremia  -repeat sodium this AM, especially given seizure on arrival     Fluids: 0.9 saline   Diet: NPO  DVT prophylaxis: heparin sub q  Code status: FULL     Critical care time 35 minutes    Alejandro Bob DO  [place] Hospitalist

## 2024-06-27 NOTE — ED QUICK NOTES
Patient taken to CT. Back in room. Calm at this moment. Airway patent. Seizure precautions in place.

## 2024-06-27 NOTE — ED QUICK NOTES
Tereza MITCHELL notified about pt's temp. This RN is to administer 650g of acetaminophen rectally.

## 2024-06-27 NOTE — PROGRESS NOTES
[facility] Pharmacy Note:  Renal Adjustment for cefepime (MAXIPIME)    Calos Hernandez is a 43 year old patient who has been prescribed cefepime (MAXIPIME) 1 g every 8 hrs.  The estimated creatinine clearance is 102.8 mL/min (based on SCr of 0.6 mg/dL). The dose has been adjusted to cefepime (MAXIPIME) 2 g every 8 hrs per hospital renal dose adjustment protocol for treatment of CNS infection.  Pharmacy will follow and adjust dose as warranted for additional renal function changes.    Thank you,    Ward Peralta, PharmD  6/27/2024  3:23 PM

## 2024-06-27 NOTE — PROCEDURES
Procedure Note: Lumbar puncture    Indications:  encephalitis     Informed consent was obtained (explaining the procedure and risks and benefits of procedure) from patient's family member.     A time out was completed, verifying correct patient, procedure,site, positioning, and implants or special equipment.    Patient's lumbar spine region was palpated to identify L4-L5 spinous processes. Iodine cleaning solution was applied topically to the skin. Using a 27 gauge needle (aspirating during insertion), 3 cc of 1% lidocaine was injected into the L4-L5 interspace. The spinal needle was then inserted between the L4-L5 spinous processes. Approximately 21 mL of clear cerebrospinal fluid was collected. Following the procedure, pressure with a gauze pad was held briefly upon the site of puncture to minimize bleeding. The site was cleaned with alcohol and a bandage was applied.     There were no complications.  Patient remained stable throughout the procedure.     Kerwin Howard DO  [facility] V[facility] Neurology

## 2024-06-27 NOTE — CONSULTS
[facility]  [address] [place] [address], [address]  [facility] [address]  [phone]          NEUROLOGY  CONSULTATION NOTE  [facility]  part of [facility]    Report of Consultation    Calos Hernandez Patient Status:  Inpatient     1980 MRN [id number]    Location [facility] 2W/SW Attending Alejandro Bob DO    Hosp Day # 0 PCP No primary care provider on file.      Date of Admission:  2024  Date of Consult:  2024  Reason for Admission/Consultation: Encephalopathy, concern for infectious encephalitis, first-time seizure, headache x 2 weeks  Requested by: Alejandro Kim DO  Name of Outside Hospital if Transferred: N/A  Time of patient arrival: 12:19 AM   on 24     _________________________________________________________________________________________    Chief Complaint:   Chief Complaint   Patient presents with    Hypertension       HPI:  Calos Hernandez is a 43 year old woman  w/ a pmhx sig. for HTN and dental caries who presents with 1 week of new onset headaches that progressively became severe prompting her to an H ED 3x and were associated with changes in her vision now seen after the patient had a seizure in the emergency department and was febrile to 102.    HPI as per ED attending, chart review, patient's sister,  brother, and her niece.    Approximately 1 week ago the patient developed headaches.  At baseline she does not have frequent headaches.  Initially the headaches were daily but not severe and reportedly were manageable with over-the-counter medications.  She went to an outside emergency department 3 times in the last 2 weeks for these headaches.  Subsequently she reports that the headaches became worse.  She had difficulty staying asleep because the headaches would wake her from her sleep.  Her brother reports that in the last 48 to 72 hours patient was crying because her headaches were so painful.  He states that she had  more frequent episodes of crying out in severe pain because of her headaches.    6/26/2024  Patient was noted to have visual field deficit.  She was eating and her brother witnessed her sit down noodles directly in front of her but then she said \"I can't see where they are.\"  Is unclear if she had blurred vision or field cut.  4:30 PM-walked into [employer] with her sister.  No language deficits.  However, afterwards patient vomited.  8:00 PM-headache became much more severe.    Patient significant other near lives near [place] and brought her to [facility] ER.     Per report by the ED staff the patient was unable to write her history.  Her significant other provided the history.  They state that she had a positive spontaneous speech.  She had a seizure in the emergency department.  Afterwards patient became postictal and was given multiple doses of Ativan.  She was loaded with 1 g of Keppra.  Later an additional 1500 mg were given to patient for a total loading dose of 60 mg/kg.  She discovered to be febrile to 102 °F.  She was given acyclovir, ceftriaxone, and vancomycin with dosing per CNS penetration.  Also given a dose of dexamethasone.      This morning family reports that the patient has a ?parrot in her room and does not clean the birdcage.    Review and summation of prior records        ROS:  Pertinent positive and negatives per HPI.  All others were reviewed and negative.    History reviewed. No pertinent past medical history.    History reviewed. No pertinent surgical history.    No family history on file.    Social History     Socioeconomic History    Marital status: Single     Spouse name: Not on file    Number of children: Not on file    Years of education: Not on file    Highest education level: Not on file   Occupational History    Not on file   Tobacco Use    Smoking status: Not on file    Smokeless tobacco: Not on file   Substance and Sexual Activity    Alcohol use: Not on file    Drug use: Not on  file    Sexual activity: Not on file   Other Topics Concern    Not on file   Social History Narrative    Not on file     Social Determinants of Health     Financial Resource Strain: Not on file   Food Insecurity: Not on file   Transportation Needs: Not on file   Physical Activity: Not on file   Stress: Not on file   Social Connections: Not on file   Housing Stability: Not on file       Outpatient Medications  No current outpatient medications     Inpatient Medications  No current outpatient medications on file.        [Held by provider] heparin  5,000 Units Subcutaneous q12h    levETIRAcetam  1,500 mg Intravenous Once    cefTRIAXone  2 g Intravenous q12h    acyclovir  10 mg/kg Intravenous Q8H    vancomycin  1,000 mg Intravenous Q12H         acetaminophen    ondansetron    prochlorperazine    Objective:  Last vitals and weight :  Vitals:    06/27/24 1000   BP: (!) 164/103   Pulse: (!) 121   Resp: (!) 31   Temp:        Exam:  - General: appears stated age, delirious, fatigued, no distress, and slowed mentation  - CV: symmetric pulses     - Pulmonary: no signs of respiratory distress. Normal excursion of the chest.   Neurologic Exam  - Mental status: does not regard.  does not open eyes to shouting in the ear, loud voice, and sternal rub  does grimace to noxious stimuli.    does not resist passive eyelid opening.  does not follow commands.   .No gaze preference.  - Cranial nerves: Pupils are 3mm briskly constricting to 2mm and equally round and reactive to light  in a well lit room..   No forced gaze deviation..Gaze is dysconjugate.. No roving eye movements. Occulocephalics:  deferred. Facial grimace to retromandibular pressure appears absent.  There is no obvious pathological facial asymmetry.     - Motor/sensory: Localizes with her right arm more than her left to sternal rub.  Moving the right side more than the left. LEFT arm was slightly in extension initially with noxious stimuli.  Withdraws both upper extremities  to nailbed pressure.  Withdraws her  RIGHT leg to nailbed pressure more briskly than the left.  Reflexes:    C5 C6 C7  L4 S1   R     2+ 2+   L    3+ 2+      Joey's sign:absent   Nonsustained clonus: Absent   Sustained clonus: Absent   - Plantar response: right side indeterminate; left side extensor vs. withdrawaal                                     Data reviewed    ECG findings by monitor or 12-lead:  Ecg:   Results for orders placed or performed during the hospital encounter of 06/27/24   EKG 12 Lead   Result Value Ref Range    Ventricular rate 119 BPM    Atrial rate 119 BPM    P-R Interval 124 ms    QRS Duration 74 ms    Q-T Interval 318 ms    QTC Calculation (Bezet) 447 ms    P Axis 75 degrees    R Axis 58 degrees    T Axis 87 degrees       Test results/Imaging:   No results found for: \"CHOL\", \"TRIG\", \"HDL\", \"LDL\", \"CHOLHDL\"  Recent Labs   Lab 06/27/24  0123 06/27/24  0717   WBC 7.7 7.5   HGB 11.6* 11.7*   HCT 36.2 36.1   .0 366.0     Recent Labs   Lab 06/27/24  0123   *  130*   K 2.8*  3.0*   CL 93*  95*   CO2 21.0  21.0   BUN 6*  6*   ALT 60*  56*   AST 77*  76*     No results found for: \"A1C\"  Last A1c value was  % done  .           CT BRAIN OR HEAD (40993)    Result Date: 6/27/2024  CONCLUSION:  1. No acute intracranial finding. 2. Small probably chronic left basal ganglionic lacunar infarct related to small vessel disease. 3. Divergent gaze. 4. No major discrepancy with preliminary Vision radiology report.    Dictated by (CST): Rodriguez Stoner MD on 6/27/2024 at 6:30 AM     Finalized by (CST): Rodriguez Stoner MD on 6/27/2024 at 6:34 AM           Performed an independent visualization of: CT brain WO and CTA head/neck  CTV  Imaging revealed: Agree with radiology read.        43 year old RH woman w/ a pmhx of HTN and dental caries who presents with 2 weeks of new onset progressively worsening/severe headaches associated with changes in vision, generalized tonic-clonic seizure in our  emergency department, and fever to 102 degrees.  On exam she localizes to noxious stimuli with her R >L UE and withdraws or briskly on the right compared to the left.  Reflexes are increased in her left leg.  Loaded with Keppra, and empirically treated for bacterial and viral meningitis.  Video EEG negative for any ictal or interictal discharges thus far.  Stat MRI brain pending.  After MRI patient will have lumbar puncture.  Greatest suspicion is for a CNS infection.  Seizure is likely provoked/symptomatic related to putative CNS infection.  Potential sources include her dental caries and zoonotic infection.  Another less likely would be autoimmune encephalitis.  Pending MRI brain will consider empiric treatment with IVIG as well.    Probable anti-NMDA receptor encephalitis*   All three criteria must be met:   Rapid onset (<3 months) of at least four of the six following major groups of symptoms:   Abnormal (psychiatric) behavior or cognitive dysfunction []   Speech dysfunction [x]   Seizures  [x]   Movement disorder, dyskinesias, or rigidity/abnormal postures  []   Decreased level of consciousness  [x]   Autonomic dysfunction or central hypoventilation [x]   At least one of the following laboratory results:   Abnormal EEG (focal or diffuse slow or disorganized activity, epileptic activity, or extreme delta brush) []   CSF with pleocytosis or oligoclonal bands []   Reasonable exclusion of other disorders []   Definite anti-NMDA receptor encephalitis*   IgG anti-GluN1 antibodies in the presence of one or more of the six major groups of symptoms, after reasonable exclusion of other disorders []   * Patients with a history of herpes simplex virus encephalitis in the previous weeks might have relapsing immune-mediated neurologic symptoms (post-herpes simplex virus encephalitis). []          Regarding her seizure:  A first seizure is classified  as provoked, acute symptomatic, remote symptomatic, or unprovoked.    A  provoked seizure is caused by a toxin, medication, or metabolic factor.  Sleep deprivation is not considered a provoking factor.    An acute symptomatic seizure are events that occur in close temporal relationship with an acute CNS insult and which may be  structural, infectious, or due to inflammation.    Remote symptomatic seizures are  caused by a pre-existing brain injury.    Epilepsy is defined as unprovoked seizures with a greater than 60% chance of a second seizure in a 10-year period.    Per the 2015 American Academy of neurology guidelines, the following patients with an unprovoked seizure should be started on antiepileptic drugs: Patients with a prior brain lesion or insult, epileptiform EEG abnormality, significant brain imaging abnormality, nocturnal seizure.  Focal seizures are also an indication to initiate treatment.        new onset headache x 2 weeks, change in vision, seizure, febrile to 102,  Differential Diagnosis:  CNS infectious process  Viral encephalitis  Bacterial meningitis  Autoimmune encephalitis/paraneoplastic encephalitis  Malignancy with intracranial metastases; less likely  ?neurosarcoidosis          Diagnosis:  CTA head completed  CTV completed dural venous thrombosis excluded  MRI brain with and without contrast to evaluate for abscess, space-occupying lesion, changes associated HSV encephalitis (i.e. temporal lobe hemorrhage/enhancement/hyperintensity), or paraneoplastic process  Video EEG; thus far negative for seizures  ESR  CRP  Coags for lumbar puncture  Blood cultures   Lumbar puncture; will be performed at bedside today.  Paraneoplastic panel in the CSF and serum  Therapeutics:  Neuro checks Q1   Seizure precautions  Telemetry  4mg Ativan for status epilepticus or  seizures lasting longer than 5 minutes, or multiple seizures without return to baseline in the interim. Max daily dose  of ativan is 0.1 mg/kg.  Keppra 1500 mg twice daily; will readjust dose  Precedex for  sedation  One-time dose of dexamethasone in the ER  Acyclovir  Vancomycin and cefepime  Goal is for euthermia.  Elevated temperature should be treated.  Will strongly consider empiric treatment with IVIG. Update: added IVIG. Can deescalate after LP results.             This document is not intended to support charting by exception.  Sections left blank in a completed note should be presumed not to have been done.    Level of complexity was based on - interviewing, examining the patient, establishing a plan of care, as well as review of all neuroimaging and cardiovascular studies.    Total critical care time spent exceeds 45 minutes.

## 2024-06-27 NOTE — PROGRESS NOTES
[facility] Dosing Service      Initial Pharmacokinetic Consult for Vancomycin Dosing     Calos Hernandez is a 43 year old female who is being initiated on vancomycin therapy for CNS infection.  Pharmacy has been asked to dose vancomycin by Dr. Foster.  The initial treatment and monitoring approach will be steady state AUC strategy.        Weight and Temperature:    Wt Readings from Last 1 Encounters:   24 55 kg (121 lb 4.1 oz)        Temp Readings from Last 1 Encounters:   24 (!) 101.1 °F (38.4 °C) (Temporal)      Labs:   Recent Labs   Lab 24  0123   CREATSERUM 0.60      Estimated Creatinine Clearance: 102.8 mL/min (based on SCr of 0.6 mg/dL).     Recent Labs   Lab 24  0123   WBC 7.7          The Pharmacokinetic Target is:     to 600 mg-h/L and trough <=15 mg/L    Renal Dosing Considerations:    None     Assessment/Plan:   Initial/Loading dose: Has received 1000 mg IV (20 mg/kg, capped at 2250 mg) x 1 initial dose.      Maintenance dose: Pharmacy will dose vancomycin at 1000 mg IV every 12 hours    Monitorin) Plan for vancomycin peak and trough to be obtained at steady state    2) Pharmacy will order SCr as clinically indicated to assess renal function.    3) Pharmacy will monitor for toxicity and efficacy, adjust vancomycin dose and/or frequency, and order vancomycin levels as appropriate per the Pharmacy and Therapeutics Committee approved protocol until discontinuation of the medication.       We appreciate the opportunity to assist in the care of this patient.     Ward Peralta, PharmD  2024  7:46 AM  [place]  Pharmacy Extension: [phone]

## 2024-06-27 NOTE — CONSULTS
[facility]  part of [facility] Infectious Disease Consult    Calos Hernandez Patient Status:  Inpatient    1980 MRN [id number]   Location [facility] 2W/SW Attending Alejandro Bob, DO   Hosp Day # 0 PCP No primary care provider on file.     Reason for Consultation:  To help with infection and treatment, requested by Dr. 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  No recent trauma, travel.   Here in house she was found to be febrile,had a seizure in the waiting room of the hospital.   Now in ICU, on supplemental O2,   Family at the bed side,   ID is asked to help with infection and treatment,     History:  History reviewed. No pertinent past medical history.  History reviewed. No pertinent surgical history.  No family history on file.       Allergies:  Not on File    Medications:    Current Facility-Administered Medications:     sodium chloride 0.9% infusion, , Intravenous, Continuous    [Held by provider] heparin (Porcine) 5000 UNIT/ML injection 5,000 Units, 5,000 Units, Subcutaneous, q12h    acetaminophen (Tylenol Extra Strength) tab 500 mg, 500 mg, Oral, Q4H PRN    ondansetron (Zofran) 4 MG/2ML injection 4 mg, 4 mg, Intravenous, Q6H PRN    prochlorperazine (Compazine) 10 MG/2ML injection 5 mg, 5 mg, Intravenous, Q8H PRN    [DISCONTINUED] levETIRAcetam (Keppra) 2,500 mg in sodium chloride 0.9% 100 mL IVPB, 60 mg/kg, Intravenous, Once **AND** levETIRAcetam (Keppra) 500 mg/5mL injection 1,500 mg, 1,500 mg, Intravenous,  Once    cefTRIAXone (Rocephin) 2 g in sodium chloride 0.9% 100 mL IVPB-ADDV, 2 g, Intravenous, q12h    acyclovir (Zovirax) 550 mg in sodium chloride 0.9% 100 mL IVPB, 10 mg/kg, Intravenous, Q8H    vancomycin (Vancocin) 1,000 mg in sodium chloride 0.9% 250 mL IVPB-ADDV, 1,000 mg, Intravenous, Q12H    dexmedeTOMIDine in sodium chloride 0.9% (Precedex) 400 mcg/100mL infusion premix, 0.2-1.5 mcg/kg/hr (Dosing Weight), Intravenous, Continuous    LORazepam (Ativan) 2 mg/mL injection 2 mg, 2 mg, Intravenous, Q10 Min PRN    potassium chloride 40 mEq in 250mL sodium chloride 0.9% IVPB premix, 40 mEq, Intravenous, Once **FOLLOWED BY** potassium chloride 20 mEq/100mL IVPB premix 20 mEq, 20 mEq, Intravenous, Once    Review of Systems:   Constitutional: Negative for anorexia, chills, fatigue, fevers, malaise, night sweats and weight loss.  Eyes: Negative for visual disturbance, irritation and redness.  Ears, nose, mouth, throat, and face: Negative for hearing loss, tinnitus, nasal congestion, snoring, sore throat, hoarseness and voice change.  Respiratory: Negative for cough, sputum, hemoptysis, chest pain, wheezing, dyspnea on exertion, or stridor.  Cardiovascular: Negative for chest pain, palpitations, irregular heart beats, syncope, fatigue, orthopnea, paroxysmal nocturnal dyspnea, lower extremity edema.  Gastrointestinal: Negative for dysphagia, odynophagia, reflux symptoms, nausea, vomiting, change in bowel habits, diarrhea, constipation and abdominal pain.  Integument/breast: Negative for rash, skin lesions, and pruritus.  Hematologic/lymphatic: Negative for easy bruising, bleeding, and lymphadenopathy.  Musculoskeletal: Negative for myalgias, arthralgias, muscle weakness.  Neurological: Negative for headaches, dizziness, seizures, memory problems, trouble swallowing, speech problems, gait problems and weakness.  Behavioral/Psych: Negative for active tobacco use.  Endocrine: No history of of diabetes, thyroid  disorder.  Unable to obtain,     Vital signs in last 24 hours:  Patient Vitals for the past 24 hrs:   BP Temp Temp src Pulse Resp SpO2 Height Weight   06/27/24 1000 (!) 164/103 -- -- (!) 121 (!) 31 100 % -- --   06/27/24 0900 (!) 151/122 -- -- 102 (!) 46 98 % -- --   06/27/24 0800 157/83 99.4 °F (37.4 °C) Temporal 100 25 97 % -- --   06/27/24 0704 -- -- -- -- -- -- 5' 7\" (1.702 m) 121 lb 4.1 oz (55 kg)   06/27/24 0700 (!) 163/88 -- -- 113 22 98 % -- --   06/27/24 0646 -- (!) 101.1 °F (38.4 °C) Temporal -- -- -- -- --   06/27/24 0600 (!) 168/87 -- -- 94 25 97 % -- --   06/27/24 0529 -- (!) 102.5 °F (39.2 °C) Rectal -- -- -- -- --   06/27/24 0500 (!) 155/97 -- -- (!) 124 (!) 28 97 % -- --   06/27/24 0445 (!) 157/95 -- -- 118 (!) 35 100 % -- --   06/27/24 0400 -- -- -- 81 (!) 35 100 % -- --   06/27/24 0230 (!) 165/97 -- -- 91 (!) 36 100 % -- --   06/27/24 0145 (!) 189/98 -- -- 115 25 96 % -- --   06/27/24 0122 -- 98.8 °F (37.1 °C) Temporal -- -- -- -- --   06/27/24 0042 -- -- -- -- -- -- -- 92 lb (41.7 kg)   06/27/24 0041 (!) 176/78 98.2 °F (36.8 °C) Temporal 99 18 99 % -- --       Physical Exam:   General: alert, cooperative, oriented.  No respiratory distress.   Head: Normocephalic, without obvious abnormality, atraumatic.   Eyes: Conjunctivae/corneas clear.  No scleral icterus.  No conjunctival     hemorrhage.   Nose: Nares normal.   Throat: Lips, mucosa, and tongue normal.  No thrush noted.   Neck: Soft, supple neck; trachea midline, no adenopathy, no thyromegaly.   Lungs: CTAB, normal and equal bilateral chest rise   Chest wall: No tenderness or deformity.   Heart: Regular rate and rhythm, normal S1S2, no murmur.   Abdomen: soft, non-tender, non-distended, no masses, no guarding, no     rebound, positive BS.   Extremity: no edema, no cyanosis   Skin: No rashes or lesions.   Neurological: Alert, interactive, no focal deficits    Labs:  Lab Results   Component Value Date    WBC 7.5 06/27/2024    HGB 11.7 06/27/2024     HCT 36.1 06/27/2024    .0 06/27/2024    CREATSERUM 0.60 06/27/2024    CREATSERUM 0.62 06/27/2024    BUN 6 06/27/2024    BUN 6 06/27/2024     06/27/2024     06/27/2024    K 3.0 06/27/2024    K 2.8 06/27/2024    CL 95 06/27/2024    CL 93 06/27/2024    CO2 21.0 06/27/2024    CO2 21.0 06/27/2024     06/27/2024    CA 9.2 06/27/2024    CA 9.5 06/27/2024    ALB 4.2 06/27/2024    ALB 4.4 06/27/2024    ALKPHO 532 06/27/2024    ALKPHO 564 06/27/2024    BILT 1.4 06/27/2024    BILT 1.4 06/27/2024    TP 9.4 06/27/2024    AST 76 06/27/2024    AST 77 06/27/2024    ALT 56 06/27/2024    ALT 60 06/27/2024     06/27/2024    ESRML 127 06/27/2024    CRP 1.30 06/27/2024    ETOH <3 06/27/2024       Radiology:  Reviewed,       Cultures:  Reviewed,     Assessment and Plan:    1.  Syndrome of Fever, headache and AMS:   - Complained of toothache at home, headaches for few days, had a Sz in the ER waiting room,   - On supplemental O2 and being monitored for airway protection,   - Differential includes meningoencephalitis with possible aspiration,   - Will need LP,  - Will add IV Cefepime, Flagyl, Continue IV Vanc and Acyclcovir,   - MRI of head,   - Blood cul are NGTD  - Strep Pneumo antigen in urine,   - Supportive care,     2.    Disposition: inhouse, a middle aged female admitted with headache and AMS with fever and a new Sz, Possible Meningoencephalitis,   - Neurology input,   - Follow LP and MRI,  - Continue IV Vanc and Acyclovir, will dc Ceftriaxone and add IV Cefepime with Flagyl,   - Follow Pending cul,   - Droplet precautions,     Discussed with family, RN, all questions answered, further recommendations to follow, Thanks,         Thank you for consulting [facility] for Calos Hernandez.  If you have any questions or concerns please call [facility] Infectious Disease at [phone].     Michael Camejo MD  6/27/2024  12:52 PM

## 2024-06-28 PROBLEM — G93.41 ACUTE METABOLIC ENCEPHALOPATHY: Status: ACTIVE | Noted: 2024-01-01

## 2024-06-28 PROBLEM — H57.03: Status: ACTIVE | Noted: 2024-01-01

## 2024-06-28 PROBLEM — G04.90: Status: ACTIVE | Noted: 2024-01-01

## 2024-06-28 PROBLEM — Z86.73 CEREBRAL INFARCTION, CHRONIC: Status: ACTIVE | Noted: 2024-01-01

## 2024-06-28 NOTE — PROCEDURES
Continuous Video EEG report    REFERRING PHYSICIAN: Alejandro Bob DO      Date/TIME STARTED: 6/28/24, 0738  Date/TIME STOPPED: 6/28/24, 13:17  DURATION: 5:39  TECHNIQUE: 21 channels of EEG, 2 channels of EOG, and 1 channel of EKG were recorded utilizing the 10-20 International System of Electrode Placement. The recording was performed in a digitized monopolar referential format and playback was reformatted into various referential and bipolar montages utilizing appropriate filter settings. Automatic seizure and spike detection programs were utilized throughout the recording. Video was recorded during the study  CLINICAL DATA:  Patient is sent for the evaluation of possible seizures.    MEDICATION:  Continuous Medications:   sodium chloride 83 mL/hr at 06/28/24 0907    dexmedetomidine 1 mcg/kg/hr (06/28/24 0843)     Scheduled Medications:  No current outpatient medications on file.  PRN Medications:    acetaminophen    ondansetron    prochlorperazine    LORazepam    acetaminophen    hydrALAzine    morphINE **OR** morphINE **OR** morphINE      BACKGROUND:  The posterior dominant rhythm consisted of well-organized 9-10 Hz rhythmic waveforms, symmetrically distributed over both posterior quadrants and was reactive to eye opening.    EEG ABNORMALITY:  Slowing: None  Epileptiform activity: None  Seizures: None  Abnormal movements: None    Significant electrode and muscle artifact was noted throughout the recording.    IMPRESSION:  This is a normal wake and drowsy EEG.   No epileptiform activity, focal slowing, nor seizures were seen throughout the study       Debi Rose M.D.  Neurology

## 2024-06-28 NOTE — PROGRESS NOTES
[facility] Pulmonary, Critical Care and Sleep    Calos Hernandez Patient Status:  Inpatient    1980 MRN [id number]   Location [facility] 2W/SW Attending Alejandro Bob DO   Hosp Day # 1 PCP No primary care provider on file.     Date of Admission: 2024  1:20 AM    Admission Diagnosis: Hypokalemia [E87.6]  Delirium [R41.0]  Hyponatremia [E87.1]  New onset seizure (HCC) [R56.9]    S: s/p LP yesterday. Still somnolent but moaning.     Scheduled Medications:     levETIRAcetam  500 mg Intravenous Q12H    acyclovir  10 mg/kg Intravenous Q8H    vancomycin  1,000 mg Intravenous Q12H    cefepime  2 g Intravenous Q8H    metRONIDAZOLE  500 mg Intravenous Q8H    heparin  5,000 Units Subcutaneous q12h    immune globulin  0.4 g/kg Intravenous Daily    metoprolol        LORazepam           Infusing Medications:     sodium chloride 83 mL/hr at 24 0907    dexmedetomidine 1 mcg/kg/hr (24 0843)       PRN Medications:    acetaminophen    ondansetron    prochlorperazine    LORazepam    acetaminophen    hydrALAzine    metoprolol    morphINE **OR** morphINE **OR** morphINE    LORazepam    OBJECTIVE:  BP (!) 162/114 (BP Location: Left arm)   Pulse 102   Temp 97 °F (36.1 °C) (Temporal)   Resp (!) 29   Ht 170.2 cm (5' 7\")   Wt 121 lb 4.1 oz (55 kg)   LMP 2024   SpO2 100%   BMI 18.99 kg/m²    Temp (24hrs), Av.3 °F (36.8 °C), Min:97 °F (36.1 °C), Max:100.5 °F (38.1 °C)             Wt Readings from Last 3 Encounters:   24 121 lb 4.1 oz (55 kg)       I/O last 3 completed shifts:  In: 2488.7 [I.V.:2288.7; IV PIGGYBACK:200]  Out: 925 [Urine:925]  I/O this shift:  In: 0   Out: 750 [Urine:750]     O2: 10 LPM via mask.   General: NAD.   Neuro: somnolent, no focal deficits. Responds to noxious stimuli.   HEENT: PERRL  Neck : No LAD  CV: RRR, nl S1, S2, no S4, S3 or murmur.   Lungs: Coare bilaterally.   Abd: Nontender, non distended.   Ext: No edema.   Skin: No rashes.     Recent Labs   Lab  06/27/24  0123 06/27/24  0717 06/28/24  0653   WBC 7.7 7.5 7.0   HGB 11.6* 11.7* 11.8*   HCT 36.2 36.1 35.0   .0 366.0 280.0     Recent Labs   Lab 06/27/24  0123 06/27/24  0717 06/27/24  2213 06/28/24  0208   *  135* 119*  --  119*   BUN 6*  6*  --   --  10   CREATSERUM 0.62  0.60  --   --  0.60   CA 9.5  9.2  --   --  7.7*   *  130*  --   --  130*   K 2.8*  3.0*  --  3.6 3.4*   CL 93*  95*  --   --  99   CO2 21.0  21.0  --   --  25.0   AST 77*  76*  --   --   --    ALT 60*  56*  --   --   --    ALB 4.4  4.2  --   --   --      Recent Labs   Lab 06/27/24  0123 06/27/24  1351   INR 1.01  --    PTT  --  34.2     Recent Labs   Lab 06/28/24  0006   ABGPHT 7.50*   ZARNHU5G 30*   CMYIL5V 95   ABGHCO3 25.6   SITE Left Radial       COVID-19 Lab Results    COVID-19  No results found for: \"COVID19\"    Pro-Calcitonin  No results for input(s): \"PCT\" in the last 168 hours.    Cardiac  No results for input(s): \"TROP\", \"PBNP\" in the last 168 hours.    Creatinine Kinase  No results for input(s): \"CK\" in the last 168 hours.    Inflammatory Markers  Recent Labs   Lab 06/27/24  0717   CRP 1.30*       Imaging:   CXR 6/27/2024       Chest images personally reviewed.      CT head 6/27/2024  1. No acute intracranial finding.   2. Small probably chronic left basal ganglionic lacunar infarct related to small vessel disease.   3. Divergent gaze.   4. No major discrepancy with preliminary [facility] radiology report.       CTA head 6/27/2024  1. Negative CTA and CTV of brain.       MRI brain 6/27/24:  1. Cortical and subcortical T2/FLAIR hyperintense signal abnormality involving the right mesial temporal lobe with corresponding nodular enhancement and equivocal associated diffusion restriction.  Primary differential considerations include sequelae of   infectious encephalitis (such as HSV encephalitis), postictal phenomenon from recent seizure activity/status epilepticus, a nonspecific limbic/autoimmune encephalitis,  or less likely neoplasm.  Lumbar puncture correlation is recommended.  In addition,   post treatment follow-up imaging is recommended to assess evolution.   2. Chronic lacunar infarct in the left basal nuclei.  No other acute or subacute infarction.   3. Mild leptomeningeal/vascular hyperenhancement at the cerebellum could relate to vascular in cord or potentially meningitis.   4. Lesser incidental findings as above.   RUQ us 6/28/2024  Normal right upper quadrant ultrasound.  No gallstones.  No biliary ductal dilatation.   LP 6/27/24:  Component      Latest Ref Rng 6/27/2024   TOTAL VOLUME CSF      mL 21.0    CSF TUBE # 2    Color CSF      Colorless  Xanthochromic !    Clarity CSF      Clear  Clear    TNC, CSF      0 - 5 /CUMM 160 (H)    RBC CSF      <1 /CUMM 35 (H)    Total Protein CSF      15.0 - 45.0 mg/dL 321.9 (H)    Glucose CSF      40 - 70 mg/dL 36 (L)      Cultures: no growth to date.    Legend:  ! Abnormal  (H) High  (L) Low  Assessment/Plan   Respiratory insufficiency secondary to likely meningitis. Possble aspiraton meningitis.   O2 protocol   Monitor airway protection.   So far looks OK. But if worsens or for procedures may require intubation and mechanical ventilation.   Neuro  Suspect meningtitis with fever and constellation of sx.   Neuro following. MRI and LP c/w with encephalitis. LP c/w with meningitis.   Precedex and ativan as needed.   AED per neuro.   ID: Suspect meningtitis/viral or bacterial  ?oral source.   ID consult.   Vanc/acylovir/rocephin 6/27 start.   GI   Elevated ggt and alkphos.   Normal RUQ us. ?cholestatsis.   Proph  Heparin subcutaneous  FEN  Consider dobhoff if not better in next 24 hrs.   Dispo  Full code. . ICU monitoring.   Critical Care Time greater than: 35 minutes    My best regards,         Adriel Tyson MD  [facility] [place] [facility] Pulmonary, Critical Care and Sleep Medicine

## 2024-06-28 NOTE — PROCEDURES
Endotracheal intubation    Patient with worsening lethargy unable to protect airway.  Discussed case with patient's pulmonologist Dr. Tyson family.  Decision made to proceed with intubation.  Glide scope used to visualize vocal cords.  Under direct visualization 7.0 ET tube advanced vocal cords.  ET tube secured at 23 cm at the lip line.  Cuff inflated with appropriate color change.  Chest x-ray to be ordered.    Kevin Baez,   Pulmonary Critical Care Medicine  [facility]

## 2024-06-28 NOTE — PROGRESS NOTES
Patient is Aox0, moaning and groaning in the morning, IV morphine seems to help, was much calmer. Patient started to snore in the afternoon after 2 pm, work of breathing more labored. Patient intubated. Family at bedside.Propofol as per MAR. Restraints in place for safety. Call light within reach. Fall  precautions maintained.

## 2024-06-28 NOTE — PROGRESS NOTES
[facility]  [address] [place] [address], [address]  [facility] [address]  [phone]          INPATIENT NEUROLOGY   FOLLOW UP CONSULT NOTE       [facility]  [facility] of [facility]    Report of Consultation    Calos Hernandez Patient Status:  Inpatient     1980 MRN [id number]    Location [facility] 2W/SW Attending Alejandro Bob DO    Hosp Day # 1 PCP No primary care provider on file.      Date of Admission:  2024  Date of Consult Follow Up:  2024        INTERVAL HPI:   -Family at beside.  The patient is having abnormal respirations.  She had an ultrasound done.         ?PHYSICAL EXAM:   BP (!) 142/104 (BP Location: Left arm)   Pulse 100   Temp 96.6 °F (35.9 °C) (Temporal)   Resp 23   Ht 67\"   Wt 121 lb 4.1 oz (55 kg)   LMP 2024   SpO2 99%   BMI 18.99 kg/m²   General appearance: Well appearing, and in no acute distress  Skin: skin color, texture normal.  No rashes or lesions.    Head: Normocephalic, atraumatic.    Neurological exam:  Does not open eyes or awaken, global aphasia  Abnormal respirations  Pinpoint pupils, not reactive   No withdrawal of any extremity to noxious stimuli       LABS/DATA:    Lab Results   Component Value Date    WBC 7.0 2024    HGB 11.8 2024    HCT 35.0 2024    .0 2024    CREATSERUM 0.50 2024    BUN 9 2024     2024    K 3.9 2024    CL 98 2024    CO2 24.0 2024     2024    CA 8.0 2024    ALB 3.3 2024    ALKPHO 433 2024    BILT 1.1 2024    TP 8.1 2024    AST 78 2024    ALT 48 2024    PTT 34.2 2024    MG 2.8 2024    PHOS 2.2 2024       HGBA1C:  No results found for: \"A1C\", \"EAG\"    Lab Results   Component Value Date     2024    HDL 68 2024    TRIG 86 2024    VLDL 17 2024         IMAGING:  XR CHEST AP PORTABLE  (CPT=71045)    Result Date:  6/27/2024  CONCLUSION:  1. Prominence to the bronchovascular markings may be related to a combination of semi upright technique, and possibly bronchitis.  No evidence of pneumonia.    Dictated by (CST): Rodriguez Stoner MD on 6/27/2024 at 1:27 PM     Finalized by (CST): Rodriguez Stoner MD on 6/27/2024 at 1:30 PM          MRI BRAIN (W+WO) (CPT=70553)    Result Date: 6/27/2024  CONCLUSION:  1. Cortical and subcortical T2/FLAIR hyperintense signal abnormality involving the right mesial temporal lobe with corresponding nodular enhancement and equivocal associated diffusion restriction.  Primary differential considerations include sequelae of infectious encephalitis (such as HSV encephalitis), postictal phenomenon from recent seizure activity/status epilepticus, a nonspecific limbic/autoimmune encephalitis, or less likely neoplasm.  Lumbar puncture correlation is recommended.  In addition, post treatment follow-up imaging is recommended to assess evolution. 2. Chronic lacunar infarct in the left basal nuclei.  No other acute or subacute infarction. 3. Mild leptomeningeal/vascular hyperenhancement at the cerebellum could relate to vascular in cord or potentially meningitis. 4. Lesser incidental findings as above.   Results of this examination were discussed with the patient's physician, Dr. Howard, by Dr. Dahl at 1304 on 06/27/2024.  elm-remote  Dictated by (CST): Yomi Dahl MD on 6/27/2024 at 12:50 PM     Finalized by (CST): Yomi Dahl MD on 6/27/2024 at 1:05 PM          CTA BRAIN + CTV BRAIN (CPT=70496)    Result Date: 6/27/2024  CONCLUSION:  1. Negative CTA and CTV of brain.    Dictated by (CST): Rodriguez Stoner MD on 6/27/2024 at 9:13 AM     Finalized by (CST): Rodriguez Stoner MD on 6/27/2024 at 9:19 AM          CT BRAIN OR HEAD (61881)    Result Date: 6/27/2024  CONCLUSION:  1. No acute intracranial finding. 2. Small probably chronic left basal ganglionic lacunar infarct related to small vessel disease. 3.  Divergent gaze. 4. No major discrepancy with preliminary [facility] radiology report.    Dictated by (CST): Rodriguez Stoner MD on 6/27/2024 at 6:30 AM     Finalized by (CST): Rodriguez Stoner MD on 6/27/2024 at 6:34 AM            Reviewed the MRI brain images    ASSESSMENT:  The patient is a 43 year old woman with past medical history of hypertension who presented with a 1 week history of progressively severe headaches vision changes with new seizure in our emergency department and fevers.  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  Elevated LFTs precludes statin use.  -hemoglobin A1c  -PT, OT and speech therapy   -Blood pressure goals: Normotensive with no more than 25% drop per day    STROKE RISK FACTORS:   *Hypertension - Target blood pressure <140/90, <130/85 for high risk, normal 120/80  *Physical inactivity - target exercise at least 3 times per week  *Obesity - target ideal body weight and girth <35\" for women, <40\" for men  *Diabetes -  Target <6.5-7%   *Smoking - Target smoking cessation  *Hyperlipidemia - Target total cholesterol < 200, Target LDL <100, < 70 for high risk, Target HDL >45 for men, >55 for women, Target triglycerides <150    This note was prepared using Dragon Medical voice recognition dictation software and as a result, errors may occur. When identified, these errors have been corrected. While every attempt is made to correct errors during dictation, discrepancies may still exist    KAYDEN Uriostegui DO   Staff Neurologist   6/28/2024  12:48 PM

## 2024-06-28 NOTE — DIETARY NOTE
ADULT NUTRITION INITIAL ASSESSMENT    Pt is at high nutrition risk.  Pt does not meet malnutrition criteria.      RECOMMENDATIONS TO MD: See Nutrition Intervention    ADMITTING DIAGNOSIS:  Hypokalemia [E87.6]  Delirium [R41.0]  Hyponatremia [E87.1]  New onset seizure (HCC) [R56.9]  PERTINENT PAST MEDICAL HISTORY: History reviewed. No pertinent past medical history.    PATIENT STATUS: Initial 06/28/24: Pt admit for meningoencephalitis. Pt assessed due to receiving consult to start trickle feeds, \"can start today or in the AM.\" Pt just admitted yesterday with AMS. Noted to have poor dentition and damaged tooth. C/o headaches and tooth pain. Per neurology MD chart note, pt thought to have infectious meningoencephalitis complicated by seizure and encephalopathy. Pt having worsening lethargy today. Was eventually intubated. Unable to obtain diet hx at this time. No wt hx to review. Electrolytes abnormal at this time. Discussed with RN via phone call, states attempted to place OG tube, but blood was present. Will re-attempt OGT placement tomorrow.     FOOD/NUTRITION RELATED HISTORY:  Appetite: NPO  Intake: NPO  Intake Meeting Needs: NPO  Percent Meals Eaten (last 6 days)       None            Food Allergies: No Known Food Allergies (NKFA)  Cultural/Ethnic/Islam Preferences: Not Obtained    GASTROINTESTINAL: no GI issues reported per pt/chart; poor dentition    MEDICATIONS: reviewed; IVF NaCl running at 83ml/hr. Propofol running at 5ml/hr (132 kcal from lipids)   levETIRAcetam  500 mg Intravenous Q12H    sodium phosphate  15 mmol Intravenous Once    aspirin  300 mg Rectal Daily    famotidine  20 mg Intravenous BID    chlorhexidine gluconate  15 mL Mouth/Throat BID@0800,2000    acyclovir  10 mg/kg Intravenous Q8H    vancomycin  1,000 mg Intravenous Q12H    cefepime  2 g Intravenous Q8H    metRONIDAZOLE  500 mg Intravenous Q8H    heparin  5,000 Units Subcutaneous q12h     LABS: reviewed; Na, Phos, and Mg abnormal. Riders  ordered per protocol.   Recent Labs     06/27/24  0123 06/27/24  0717 06/27/24  2213 06/28/24  0208 06/28/24  0653   *  135* 119*  --  119* 110*   BUN 6*  6*  --   --  10 9   CREATSERUM 0.62  0.60  --   --  0.60 0.50*   CA 9.5  9.2  --   --  7.7* 8.0*   MG  --   --   --  1.4* 2.8*   *  130*  --   --  130* 130*   K 2.8*  3.0*  --  3.6 3.4* 3.9   CL 93*  95*  --   --  99 98   CO2 21.0  21.0  --   --  25.0 24.0   PHOS  --   --   --   --  2.2*   OSMOCALC 265*  270*  --   --  270* 269*     NUTRITION RELATED PHYSICAL FINDINGS:  - Nutrition Focused Physical Exam (NFPE):  unable to complete at this time  - Fluid Accumulation: none  ---> See RN documentation for details  - Skin Integrity: intact ---> See RN documentation for details    ANTHROPOMETRICS:  HT: 170.2 cm (5' 7\")  WT: 55 kg (121 lb 4.1 oz)   BMI: Body mass index is 18.99 kg/m².  BMI CLASSIFICATION: less than 19 kg/m2 - underweight  IBW: 135 lbs        89% IBW  Usual Body Wt: unsure, no wt hx to review.         WEIGHT HISTORY:  Patient Weight(s) for the past 336 hrs:   Weight   06/27/24 0704 55 kg (121 lb 4.1 oz)   06/27/24 0042 41.7 kg (92 lb)     Wt Readings from Last 10 Encounters:   06/27/24 55 kg (121 lb 4.1 oz)     NUTRITION DIAGNOSIS/PROBLEM:   Inadequate oral intake related to Decreased ability to consume sufficient energy as evidenced by NPO status and intubation    NUTRITION INTERVENTION:     NUTRITION PRESCRIPTION:   Estimated Nutrition needs: --dosing wt of 55 kg - wt taken on 6/28  Calories: 1002-9533 calories/day (28-34 calories per kg Dosing wt)  [place]: 1565 kcal/day  Protein: 66-77 g protein/day (1.2-1.4 g protein/kg Dosing wt)  Fluid Needs: ~1ml/kcal     - Diet:       Procedures    NPO      - If OGT able to be placed:  Enteral Nutrition: Jevity 1.2 at 25 ml/hr via OG tube. Based on average 22 hour infusion time. Current rate provides 660 kcal, 30 grams protein, 445ml total free water, and ~55% RDI's. Water flushes 30ml q  4 hours (180ml).  Total free fluid daily = 625 ml    - Meals and snacks: NPO  - Medical Food Supplements- NPO.   - Vitamin and mineral supplements: none  - Feeding assistance: NPO  - Nutrition education: not appropriate at this time     - Coordination of nutrition care: collaboration with other providers  - Discharge and transfer of nutrition care to new setting or provider: monitor plans    MONITOR AND EVALUATE/NUTRITION GOALS:  - Food and Nutrient Intake:      Monitor: for PO initiation when safe   - Food and Nutrient Administration:      Monitor: enteral nutrition initiation, tolerance to enteral nutrition, for enteral nutrition adjustment, and propofol rate  - Anthropometric Measurement:    Monitor weight  - Nutrition Goals:      maintain wt within 5%, TF meet >80% of goal within first week , labs within acceptable limits, and support body systems    DIETITIAN FOLLOW UP: RD to follow and monitor nutrition status       France Hanley RD, LDN  Clinical Dietitian  P: [phone]

## 2024-06-28 NOTE — PROCEDURES
Continuous Video EEG report    REFERRING PHYSICIAN: Alejandro Bob DO      Date/TIME STARTED: 6/27/24, 0738  Date/TIME STOPPED: 6/28/24, 0738(Time/ongoing)  DURATION: 22:54:15  TECHNIQUE: 21 channels of EEG, 2 channels of EOG, and 1 channel of EKG were recorded utilizing the 10-20 International System of Electrode Placement. The recording was performed in a digitized monopolar referential format and playback was reformatted into various referential and bipolar montages utilizing appropriate filter settings. Automatic seizure and spike detection programs were utilized throughout the recording. Video was recorded during the study  CLINICAL DATA:  Patient is sent for the evaluation of possible seizures.    MEDICATION:  Continuous Medications:   sodium chloride 83 mL/hr at 06/28/24 0907    dexmedetomidine 1 mcg/kg/hr (06/28/24 0843)     Scheduled Medications:  No current outpatient medications on file.  PRN Medications:    acetaminophen    ondansetron    prochlorperazine    LORazepam    acetaminophen    hydrALAzine    metoprolol    morphINE **OR** morphINE **OR** morphINE    LORazepam      BACKGROUND:  The posterior dominant rhythm consisted of well-organized 9-10 Hz rhythmic waveforms, symmetrically distributed over both posterior quadrants and was reactive to eye opening.    EEG ABNORMALITY:  Slowing: None  Epileptiform activity: None  Seizures: None  Abnormal movements: None    IMPRESSION:  This is a normal wake and sleep EEG.   No epileptiform activity, focal slowing, nor seizures were seen throughout the study       Debi Rose M.D.  Neurology

## 2024-06-28 NOTE — PROGRESS NOTES
[facility]  part of [facility] Infectious Disease Consult    Calos Hernandez Patient Status:  Inpatient    1980 MRN [id number]   Location [facility] 2W/SW Attending Alejandro Bob,    Hosp Day # 1 PCP No primary care provider on file.       Calos Hernandez seen and examined,   Afebrile,  Previous entries noted,   On supplemental o2,   Not interactive,     History:  History reviewed. No pertinent past medical history.  History reviewed. No pertinent surgical history.  No family history on file.       Allergies:  Not on File    Medications:    Current Facility-Administered Medications:     levETIRAcetam (Keppra) 500 mg/5mL injection 500 mg, 500 mg, Intravenous, Q12H    sodium phosphate 15 mmol in 0.9% NaCl 100mL IVPB premix, 15 mmol, Intravenous, Once    sodium chloride 0.9% infusion, , Intravenous, Continuous    acetaminophen (Tylenol Extra Strength) tab 500 mg, 500 mg, Oral, Q4H PRN    ondansetron (Zofran) 4 MG/2ML injection 4 mg, 4 mg, Intravenous, Q6H PRN    prochlorperazine (Compazine) 10 MG/2ML injection 5 mg, 5 mg, Intravenous, Q8H PRN    acyclovir (Zovirax) 550 mg in sodium chloride 0.9% 100 mL IVPB, 10 mg/kg, Intravenous, Q8H    vancomycin (Vancocin) 1,000 mg in sodium chloride 0.9% 250 mL IVPB-ADDV, 1,000 mg, Intravenous, Q12H    dexmedeTOMIDine in sodium chloride 0.9% (Precedex) 400 mcg/100mL infusion premix, 0.2-1.5 mcg/kg/hr (Dosing Weight), Intravenous, Continuous    LORazepam (Ativan) 2 mg/mL injection 2 mg, 2 mg, Intravenous, Q10 Min PRN    ceFEPIme (Maxpime) 2 g in sodium chloride 0.9% 100 mL IVPB-MBP, 2 g, Intravenous, Q8H    metRONIDAZOLE in sodium chloride 0.79% (Flagyl) 5 mg/mL IVPB premix 500 mg, 500 mg, Intravenous, Q8H    heparin (Porcine) 5000 UNIT/ML injection 5,000 Units, 5,000 Units, Subcutaneous, q12h    immune globulin (Gammagard) 10% infusion 20 g, 0.4 g/kg, Intravenous, Daily    acetaminophen (Ofirmev) 10 mg/mL infusion premix 1,000 mg, 1,000  mg, Intravenous, Q6H PRN    hydrALAzine (Apresoline) 20 mg/mL injection 10 mg, 10 mg, Intravenous, Q6H PRN    metoprolol (Lopressor) 5 mg/5mL injection, , ,     morphINE PF 2 MG/ML injection 1 mg, 1 mg, Intravenous, Q2H PRN **OR** morphINE PF 2 MG/ML injection 2 mg, 2 mg, Intravenous, Q2H PRN **OR** morphINE PF 4 MG/ML injection 4 mg, 4 mg, Intravenous, Q2H PRN    LORazepam (Ativan) 2 mg/mL injection, , ,     Review of Systems:   Constitutional: Negative for anorexia, chills, fatigue, fevers, malaise, night sweats and weight loss.  Eyes: Negative for visual disturbance, irritation and redness.  Ears, nose, mouth, throat, and face: Negative for hearing loss, tinnitus, nasal congestion, snoring, sore throat, hoarseness and voice change.  Respiratory: Negative for cough, sputum, hemoptysis, chest pain, wheezing, dyspnea on exertion, or stridor.  Cardiovascular: Negative for chest pain, palpitations, irregular heart beats, syncope, fatigue, orthopnea, paroxysmal nocturnal dyspnea, lower extremity edema.  Gastrointestinal: Negative for dysphagia, odynophagia, reflux symptoms, nausea, vomiting, change in bowel habits, diarrhea, constipation and abdominal pain.  Integument/breast: Negative for rash, skin lesions, and pruritus.  Hematologic/lymphatic: Negative for easy bruising, bleeding, and lymphadenopathy.  Musculoskeletal: Negative for myalgias, arthralgias, muscle weakness.  Neurological: Negative for headaches, dizziness, seizures, memory problems, trouble swallowing, speech problems, gait problems and weakness.  Behavioral/Psych: Negative for active tobacco use.  Endocrine: No history of of diabetes, thyroid disorder.  Unable to obtain,     Vital signs in last 24 hours:  Patient Vitals for the past 24 hrs:   BP Temp Temp src Pulse Resp SpO2 Height Weight   06/27/24 1000 (!) 164/103 -- -- (!) 121 (!) 31 100 % -- --   06/27/24 0900 (!) 151/122 -- -- 102 (!) 46 98 % -- --   06/27/24 0800 157/83 99.4 °F (37.4 °C)  Temporal 100 25 97 % -- --   06/27/24 0704 -- -- -- -- -- -- 5' 7\" (1.702 m) 121 lb 4.1 oz (55 kg)   06/27/24 0700 (!) 163/88 -- -- 113 22 98 % -- --   06/27/24 0646 -- (!) 101.1 °F (38.4 °C) Temporal -- -- -- -- --   06/27/24 0600 (!) 168/87 -- -- 94 25 97 % -- --   06/27/24 0529 -- (!) 102.5 °F (39.2 °C) Rectal -- -- -- -- --   06/27/24 0500 (!) 155/97 -- -- (!) 124 (!) 28 97 % -- --   06/27/24 0445 (!) 157/95 -- -- 118 (!) 35 100 % -- --   06/27/24 0400 -- -- -- 81 (!) 35 100 % -- --   06/27/24 0230 (!) 165/97 -- -- 91 (!) 36 100 % -- --   06/27/24 0145 (!) 189/98 -- -- 115 25 96 % -- --   06/27/24 0122 -- 98.8 °F (37.1 °C) Temporal -- -- -- -- --   06/27/24 0042 -- -- -- -- -- -- -- 92 lb (41.7 kg)   06/27/24 0041 (!) 176/78 98.2 °F (36.8 °C) Temporal 99 18 99 % -- --       Physical Exam:   General: alert, cooperative, oriented.  No respiratory distress.   Head: Normocephalic, without obvious abnormality, atraumatic.   Eyes: Conjunctivae/corneas clear.  No scleral icterus.  No conjunctival     hemorrhage.   Nose: Nares normal.   Throat: Lips, mucosa, and tongue normal.  No thrush noted.   Neck: Soft, supple neck; trachea midline, no adenopathy, no thyromegaly.   Lungs: CTAB, normal and equal bilateral chest rise   Chest wall: No tenderness or deformity.   Heart: Regular rate and rhythm, normal S1S2, no murmur.   Abdomen: soft, non-tender, non-distended, no masses, no guarding, no     rebound, positive BS.   Extremity: no edema, no cyanosis   Skin: No rashes or lesions.   Neurological: Alert, interactive, no focal deficits    Labs:  Lab Results   Component Value Date    WBC 7.0 06/28/2024    HGB 11.8 06/28/2024    HCT 35.0 06/28/2024    .0 06/28/2024    CREATSERUM 0.50 06/28/2024    BUN 9 06/28/2024     06/28/2024    K 3.9 06/28/2024    CL 98 06/28/2024    CO2 24.0 06/28/2024     06/28/2024    CA 8.0 06/28/2024    ALB 3.3 06/28/2024    ALKPHO 433 06/28/2024    BILT 1.1 06/28/2024    TP 8.1  06/28/2024    AST 78 06/28/2024    ALT 48 06/28/2024    PTT 34.2 06/27/2024    MG 2.8 06/28/2024    PHOS 2.2 06/28/2024       Radiology:  Reviewed,       Cultures:  Reviewed,     Assessment and Plan:    1.  Syndrome of Fever, headache and AMS:   - Complained of toothache at home, headaches for few days, had a Sz in the ER waiting room,   - On supplemental O2 and being monitored for airway protection,   - Differential includes meningoencephalitis with possible aspiration,   - CSF with , glucose 36 and Protein 321, follow Cul, MEP,   - Continue IV Vanc and Acyclcovir, IV Cefepime, Flagyl,   - MRI of head reviewed,   - Blood cul are NGTD  - Strep Pneumo antigen in urine,   - Supportive care,     2.    Disposition: inhouse, a middle aged female admitted with headache and AMS with fever and a new Sz, Possible Meningoencephalitis,   - Follow pending cul,   - Follow MEP,   - Continue IV Vanc and Acyclovir, IV Cefepime with Flagyl,     Discussed with family, RN, all questions answered, further recommendations to follow, Thanks,  Thank you for consulting [facility] for Calos Hernandez.  If you have any questions or concerns please call [facility] Infectious Disease at [phone].     Michael Camejo MD  6/27/2024  12:52 PM

## 2024-06-28 NOTE — RESPIRATORY THERAPY NOTE
Called to assess patient's respiratory status by ICU NP and RN. Upon arrival patient's WOB was high and respiratory pattern was irregular with accessory muscle use. Patient on 6L simple mask with appropriate SpO2 on monitor. Main concern was the ability for the patient to protect her airway. A tongue depressor was inserted into the patient's oropharynx and attempted to stimulate a cough or gag with no effect. An oral airway was inserted to protect airway until assessment by Pulmonary MD. Decision was made to intubate patient for airway protection. Patient was placed on mechanical ventilator A/C 16/500/+5/100%. FiO2 reduced to 60% while maintaining appropriate SpO2 on monitor. ABG drawn, results are below. Respiratory rate reduced to 12. RT will continue to monitor.    Recent Labs   Lab 06/28/24  1545   ABGPHT 7.49*   AZLBYS4E 31*   TUFRF5Z 237*   ABGHCO3 25.5   SITE Right Radial

## 2024-06-28 NOTE — PLAN OF CARE
Received pt on 0.8 mcg Precedex. Pt does not follow commands, non-purposeful/spastic movement noted, primarily in GUERITA upper extremities. Pt became extremely agitated/restless around 2230, writhing around in bed, HR sustaining 170s-180s. Dr. Jules notified, 5mg IV Lopressor given with slight improvement. Dr. Howard notified of change in neurologic status. Precedex gtt increased to 1.5 mcg, one-time dose of Ativan and Versed ordered, PRN morphine also ordered. Pt given one-time dose of Ativan and 4 mg of morphine, pt calmed significantly. Precedex gtt slowly titrated down, currently running at 1 mcg. Pt had a couple breakthrough periods of agitation early in the morning.     At start of shift, pt breathing in the 40s-50s, Dr. Jules to bedside. No further orders placed at that time. When pt became agitated/restless around 2230, received order for ABG. Dr. Jules notified of ABG results, no further orders.     Bed locked in lowest position, safety maintained.     Problem: NEUROLOGICAL - ADULT  Goal: Achieves stable or improved neurological status  Description: INTERVENTIONS  - Assess for and report changes in neurological status  - Initiate measures to prevent increased intracranial pressure  - Maintain blood pressure and fluid volume within ordered parameters to optimize cerebral perfusion and minimize risk of hemorrhage  - Monitor temperature, glucose, and sodium. Initiate appropriate interventions as ordered  Outcome: Not Progressing  Goal: Absence of seizures  Description: INTERVENTIONS  - Monitor for seizure activity  - Administer anti-seizure medications as ordered  - Monitor neurological status  Outcome: Progressing     Problem: RESPIRATORY - ADULT  Goal: Achieves optimal ventilation and oxygenation  Description: INTERVENTIONS:  - Assess for changes in respiratory status  - Assess for changes in mentation and behavior  - Position to facilitate oxygenation and minimize respiratory effort  - Oxygen supplementation  based on oxygen saturation or ABGs  - Provide Smoking Cessation handout, if applicable  - Encourage broncho-pulmonary hygiene including cough, deep breathe, Incentive Spirometry  - Assess the need for suctioning and perform as needed  - Assess and instruct to report SOB or any respiratory difficulty  - Respiratory Therapy support as indicated  - Manage/alleviate anxiety  - Monitor for signs/symptoms of CO2 retention  Outcome: Not Progressing     Problem: CARDIOVASCULAR - ADULT  Goal: Maintains optimal cardiac output and hemodynamic stability  Description: INTERVENTIONS:  - Monitor vital signs, rhythm, and trends  - Monitor for bleeding, hypotension and signs of decreased cardiac output  - Evaluate effectiveness of vasoactive medications to optimize hemodynamic stability  - Monitor arterial and/or venous puncture sites for bleeding and/or hematoma  - Assess quality of pulses, skin color and temperature  - Assess for signs of decreased coronary artery perfusion - ex. Angina  - Evaluate fluid balance, assess for edema, trend weights  Outcome: Progressing

## 2024-06-28 NOTE — PROGRESS NOTES
Addendum:  Noted pt had worsening lethargy and could not protect airway. Worse than this am. Intubated by Dr. Baez. Appreciate his help. BS bilaterally were coarse.   Vent 16/500/100/5 f/u ABG and CXR.   Check sputum cultures.   Family updated in detail.   Extubation when MS improved.     30 mins spent in counseling and complex decision making.     My best regards,         Adriel Tyson MD  [facility] Pulmonary, Critical Care and Sleep Medicine    CXR with ETT in place and bilateral infiltrates.   Check PCT  Message sent to ID regarding expanding coverage.

## 2024-06-28 NOTE — PROGRESS NOTES
[facility] Hospitalist Progress Note     CC: Hospital Follow up    PCP: No primary care provider on file.       Assessment/Plan:   43-year-old female with no known medical history, who presents to the hospital for evaluation of headaches and feeling ill.       Sepsis  Encephalopathy  Meningitis/encephalitis  -source unclear, poor dentition noted, tooth pain this week? Headaches prior to admission. Aspiration?  -MRI brain, ? HSV findings   -on cefepime, vancomycin, acyclovir, flagyl  -IVIG initiated, per neuro  -LP done on 6/27 appreciate neurology eval   -continue rate of fluids (she is also on acyclovir, for renal protection).   -critical care, ID, and neuro consult     Acute respiratory failure  -monitor airway protection   -Monitor saturations closely  -ABG done yesterday stable      Seizure  -now on keppra  -EEG monitoring  -neuro consult      Tachycardia  -sinus tachycardia, secondary to sepsis      Elevated liver enzymes  -alk phos high  -bili high  -borderline ast/alt  -repeat CMP this AM  -continue to trend  -RUQ ultrasound noted      Hyponatremia  -monitor sodium closely      Fluids: 0.9 saline   Diet: NPO  DVT prophylaxis: heparin sub q  Code status: FULL      Critical care time 35 minutes     Alejandro Bob DO  [facility] Hospitalist        Subjective:     Encephalopathic and lethargic. Does not open eyes to my voice or command. Saturations stable. On continuous EEG    OBJECTIVE:    Blood pressure (!) 142/104, pulse 100, temperature 96.6 °F (35.9 °C), temperature source Temporal, resp. rate 23, height 5' 7\" (1.702 m), weight 121 lb 4.1 oz (55 kg), last menstrual period 06/27/2024, SpO2 99%.    Temp:  [96.6 °F (35.9 °C)-100.5 °F (38.1 °C)] 96.6 °F (35.9 °C)  Pulse:  [] 100  Resp:  [23-46] 23  BP: (121-173)/() 142/104  SpO2:  [92 %-100 %] 99 %      Intake/Output:    Intake/Output Summary (Last 24 hours) at 6/28/2024 1253  Last data filed at 6/28/2024 1000  Gross per 24 hour    Intake 3020.7 ml   Output 1675 ml   Net 1345.7 ml       Last 3 Weights   06/27/24 0704 121 lb 4.1 oz (55 kg)   06/27/24 0042 92 lb (41.7 kg)       BP (!) 142/104 (BP Location: Left arm)   Pulse 100   Temp 96.6 °F (35.9 °C) (Temporal)   Resp 23   Ht 5' 7\" (1.702 m)   Wt 121 lb 4.1 oz (55 kg)   LMP 06/27/2024   SpO2 99%   BMI 18.99 kg/m²   General: Alert, no acute distress  HEENT: atraumatic, sclera anicteric, oral mucosa normal   Neck: non tender, no adenopathy   Lungs: clear to ausculation bilaterally, no wheezing  Heart: Regular rate and rhythm  Abdomen: soft, non tender, non distended   Extremities: No edema  Skin: no new rash, normal color  Neuro: CN II-XII intact, 5/5 strength in bilateral extremities, normal sensation in face and extremities  Psych: appropriate affect       Data Review:       Labs:     Recent Labs   Lab 06/27/24  0123 06/27/24  0717 06/28/24  0653   RBC 4.49 4.54 4.52   HGB 11.6* 11.7* 11.8*   HCT 36.2 36.1 35.0   MCV 80.6 79.5* 77.4*   MCH 25.8* 25.8* 26.1   MCHC 32.0 32.4 33.7   RDW 15.1* 15.1* 14.7   NEPRELIM 6.05 5.73 5.84   WBC 7.7 7.5 7.0   .0 366.0 280.0         Recent Labs   Lab 06/27/24  0123 06/27/24  0717 06/27/24  2213 06/28/24  0208 06/28/24  0653   *  135* 119*  --  119* 110*   BUN 6*  6*  --   --  10 9   CREATSERUM 0.62  0.60  --   --  0.60 0.50*   EGFRCR 112  113  --   --  113 118   CA 9.5  9.2  --   --  7.7* 8.0*   *  130*  --   --  130* 130*   K 2.8*  3.0*  --  3.6 3.4* 3.9   CL 93*  95*  --   --  99 98   CO2 21.0  21.0  --   --  25.0 24.0       Recent Labs   Lab 06/27/24  0123 06/28/24  0653   ALT 60*  56* 48   AST 77*  76* 78*   ALB 4.4  4.2 3.3         Imaging:  XR CHEST AP PORTABLE  (CPT=71045)    Result Date: 6/27/2024  CONCLUSION:  1. Prominence to the bronchovascular markings may be related to a combination of semi upright technique, and possibly bronchitis.  No evidence of pneumonia.    Dictated by (CST): Rodriguez Stoner MD on  6/27/2024 at 1:27 PM     Finalized by (CST): Rodriguez Stoner MD on 6/27/2024 at 1:30 PM          MRI BRAIN (W+WO) (CPT=70553)    Result Date: 6/27/2024  CONCLUSION:  1. Cortical and subcortical T2/FLAIR hyperintense signal abnormality involving the right mesial temporal lobe with corresponding nodular enhancement and equivocal associated diffusion restriction.  Primary differential considerations include sequelae of infectious encephalitis (such as HSV encephalitis), postictal phenomenon from recent seizure activity/status epilepticus, a nonspecific limbic/autoimmune encephalitis, or less likely neoplasm.  Lumbar puncture correlation is recommended.  In addition, post treatment follow-up imaging is recommended to assess evolution. 2. Chronic lacunar infarct in the left basal nuclei.  No other acute or subacute infarction. 3. Mild leptomeningeal/vascular hyperenhancement at the cerebellum could relate to vascular in cord or potentially meningitis. 4. Lesser incidental findings as above.   Results of this examination were discussed with the patient's physician, Dr. Howard, by Dr. Dahl at 1304 on 06/27/2024.  elm-remote  Dictated by (CST): Yomi Dahl MD on 6/27/2024 at 12:50 PM     Finalized by (CST): Yomi Dahl MD on 6/27/2024 at 1:05 PM          CTA BRAIN + CTV BRAIN (CPT=70496)    Result Date: 6/27/2024  CONCLUSION:  1. Negative CTA and CTV of brain.    Dictated by (CST): Rodriguez Stoner MD on 6/27/2024 at 9:13 AM     Finalized by (CST): Rodriguez Stoner MD on 6/27/2024 at 9:19 AM          CT BRAIN OR HEAD (88075)    Result Date: 6/27/2024  CONCLUSION:  1. No acute intracranial finding. 2. Small probably chronic left basal ganglionic lacunar infarct related to small vessel disease. 3. Divergent gaze. 4. No major discrepancy with preliminary [facility] radiology report.    Dictated by (CST): Rodriguez Stoner MD on 6/27/2024 at 6:30 AM     Finalized by (CST): Rodriguez Stoner MD on 6/27/2024 at 6:34 AM              Meds:      levETIRAcetam  500 mg Intravenous Q12H    sodium phosphate  15 mmol Intravenous Once    acyclovir  10 mg/kg Intravenous Q8H    vancomycin  1,000 mg Intravenous Q12H    cefepime  2 g Intravenous Q8H    metRONIDAZOLE  500 mg Intravenous Q8H    heparin  5,000 Units Subcutaneous q12h    immune globulin  0.4 g/kg Intravenous Daily      sodium chloride 83 mL/hr at 06/28/24 0907    dexmedetomidine 1 mcg/kg/hr (06/28/24 0843)       acetaminophen    ondansetron    prochlorperazine    LORazepam    acetaminophen    hydrALAzine    morphINE **OR** morphINE **OR** morphINE

## 2024-06-28 NOTE — CM/SW NOTE
06/28/24 1200   CM/ Referral Data   Referral Source    Reason for Referral Discharge planning;Financial issues   Informant Sibling   Medical Hx   Does patient have an established PCP? No   Patient Info   Patient's Current Mental Status at Time of Assessment Confused or unable to complete assessment   Patient's Home Environment House   Patient lives with Sibling   Patient Status Prior to Admission   Independent with ADLs and Mobility Yes   Discharge Needs   Anticipated D/C needs No anticipated discharge needs     Pt discussed during nursing rounds. Dx r/o CNS infection, seizure in ER, neuro and ID consulted, on 5L O2 via venturi mask (no home O2). Home w/brother, independent and working full time prior to admission. There is no insurance listed in medical record. Though patient is employed, she does not receive medical insurance through her employer per brother at bedside. Brother does believe she has active Medicaid as a health benefit. CM LVM for Job[facility] at [facility] to confirm if patient has active Medicaid number. CM anticipates pt will be weaned off O2 prior to dc. No home care needs anticipated on dc.     1505: Pt now sedated and intubated.    Plan: Home w/brother pending medical clearance.    / to remain available for support and/or discharge planning.     GERARDO Hull    [phone]

## 2024-06-29 PROBLEM — H57.03: Status: RESOLVED | Noted: 2024-01-01 | Resolved: 2024-01-01

## 2024-06-29 PROBLEM — G93.6 CEREBRAL EDEMA (HCC): Status: ACTIVE | Noted: 2024-01-01

## 2024-06-29 NOTE — PLAN OF CARE
Called by radiologist with morning CT head results. Results relayed to neurology and ICU APRN. Per neurology, propofol restarted, no sedation vacation to be done today. Mannitol ordered and given. Neuro checks done q1 hour, no change during shift. PICC line placed by ICU APRN. [place] in place, increased output, see flowsheets, Dr. Humphries, Dr. Corey, and Dr. Uriostegui aware. Increased sodium on afternoon BMP, sodium phosphate infusion stopped early per Dr. Corey. CT and MRI done per orders. Family at bedside throughout the day, updated on status and plan of care. Mother, Ann Marie, updated on plan of care.    Problem: Safety Risk - Non-Violent Restraints  Goal: Patient will remain free from self-harm  Description: INTERVENTIONS:  - Apply the least restrictive restraint to prevent harm  - Notify patient and family of reasons restraints applied  - Assess for any contributing factors to confusion (electrolyte disturbances, delirium, medications)  - Discontinue any unnecessary medical devices as soon as possible  - Assess the patient's physical comfort, circulation, skin condition, hydration, nutrition and elimination needs   - Reorient and redirection as needed  - Assess for the need to continue restraints  6/29/2024 1824 by Jasmin Tapia, RN  Outcome: Not Progressing     Problem: Patient Centered Care  Goal: Patient preferences are identified and integrated in the patient's plan of care  Description: Interventions:  - What would you like us to know as we care for you? Pt lives with younger brother  - Provide timely, complete, and accurate information to patient/family  - Incorporate patient and family knowledge, values, beliefs, and cultural backgrounds into the planning and delivery of care  - Encourage patient/family to participate in care and decision-making at the level they choose  - Honor patient and family perspectives and choices  6/29/2024 1824 by Jasmin Tapia, RN  Outcome: Not Progressing     Problem: PAIN  - ADULT  Goal: Verbalizes/displays adequate comfort level or patient's stated pain goal  Description: INTERVENTIONS:  - Encourage pt to monitor pain and request assistance  - Assess pain using appropriate pain scale  - Administer analgesics based on type and severity of pain and evaluate response  - Implement non-pharmacological measures as appropriate and evaluate response  - Consider cultural and social influences on pain and pain management  - Manage/alleviate anxiety  - Utilize distraction and/or relaxation techniques  - Monitor for opioid side effects  - Notify MD/LIP if interventions unsuccessful or patient reports new pain  - Anticipate increased pain with activity and pre-medicate as appropriate  6/29/2024 1824 by Jasmin Tapia, RN  Outcome: Not Progressing     Problem: SAFETY ADULT - FALL  Goal: Free from fall injury  Description: INTERVENTIONS:  - Assess pt frequently for physical needs  - Identify cognitive and physical deficits and behaviors that affect risk of falls.  - [place] fall precautions as indicated by assessment.  - Educate pt/family on patient safety including physical limitations  - Instruct pt to call for assistance with activity based on assessment  - Modify environment to reduce risk of injury  - Provide assistive devices as appropriate  - Consider OT/PT consult to assist with strengthening/mobility  - Encourage toileting schedule  6/29/2024 1824 by Jasmin Tapia, RN  Outcome: Not Progressing     Problem: DISCHARGE PLANNING  Goal: Discharge to home or other facility with appropriate resources  Description: INTERVENTIONS:  - Identify barriers to discharge w/pt and caregiver  - Include patient/family/discharge partner in discharge planning  - Arrange for needed discharge resources and transportation as appropriate  - Identify discharge learning needs (meds, wound care, etc)  - Arrange for interpreters to assist at discharge as needed  - Consider post-discharge preferences of  patient/family/discharge partner  - Complete POLST form as appropriate  - Assess patient's ability to be responsible for managing their own health  - Refer to Case Management Department for coordinating discharge planning if the patient needs post-hospital services based on physician/LIP order or complex needs related to functional status, cognitive ability or social support system  6/29/2024 1824 by Jasmin Tapia RN  Outcome: Not Progressing     Problem: CARDIOVASCULAR - ADULT  Goal: Maintains optimal cardiac output and hemodynamic stability  Description: INTERVENTIONS:  - Monitor vital signs, rhythm, and trends  - Monitor for bleeding, hypotension and signs of decreased cardiac output  - Evaluate effectiveness of vasoactive medications to optimize hemodynamic stability  - Monitor arterial and/or venous puncture sites for bleeding and/or hematoma  - Assess quality of pulses, skin color and temperature  - Assess for signs of decreased coronary artery perfusion - ex. Angina  - Evaluate fluid balance, assess for edema, trend weights  6/29/2024 1824 by Jasmin Tapia RN  Outcome: Not Progressing    Problem: NEUROLOGICAL - ADULT  Goal: Achieves stable or improved neurological status  Description: INTERVENTIONS  - Assess for and report changes in neurological status  - Initiate measures to prevent increased intracranial pressure  - Maintain blood pressure and fluid volume within ordered parameters to optimize cerebral perfusion and minimize risk of hemorrhage  - Monitor temperature, glucose, and sodium. Initiate appropriate interventions as ordered  6/29/2024 1824 by Jasmin Tapia RN  Outcome: Not Progressing  Goal: Absence of seizures  Description: INTERVENTIONS  - Monitor for seizure activity  - Administer anti-seizure medications as ordered  - Monitor neurological status  6/29/2024 1824 by Jasmin Tapia RN  Outcome: Not Progressing  Goal: Remains free of injury related to seizure activity  Description:  INTERVENTIONS:  - Maintain airway, patient safety  and administer oxygen as ordered  - Monitor patient for seizure activity, document and report duration and description of seizure to MD/LIP  - If seizure occurs, turn patient to side and suction secretions as needed  - Reorient patient post seizure  - Seizure pads on all 4 side rails  - Instruct patient/family to notify RN of any seizure activity  - Instruct patient/family to call for assistance with activity based on assessment  6/29/2024 1824 by Jasmin Tapia RN  Outcome: Not Progressing     Problem: RESPIRATORY - ADULT  Goal: Achieves optimal ventilation and oxygenation  Description: INTERVENTIONS:  - Assess for changes in respiratory status  - Assess for changes in mentation and behavior  - Position to facilitate oxygenation and minimize respiratory effort  - Oxygen supplementation based on oxygen saturation or ABGs  - Provide Smoking Cessation handout, if applicable  - Encourage broncho-pulmonary hygiene including cough, deep breathe, Incentive Spirometry  - Assess the need for suctioning and perform as needed  - Assess and instruct to report SOB or any respiratory difficulty  - Respiratory Therapy support as indicated  - Manage/alleviate anxiety  - Monitor for signs/symptoms of CO2 retention  6/29/2024 1824 by Jasmin Tapia, RN  Outcome: Not Progressing     Problem: GENITOURINARY - ADULT  Goal: Absence of urinary retention  Description: INTERVENTIONS:  - Assess patient’s ability to void and empty bladder  - Monitor intake/output and perform bladder scan as needed  - Follow urinary retention protocol/standard of care  - Consider collaborating with pharmacy to review patient's medication profile  - Implement strategies to promote bladder emptying  6/29/2024 1824 by Jasmin Tapia, RN  Outcome: Not Progressing

## 2024-06-29 NOTE — PROGRESS NOTES
1650: Received order for STAT MRI of the brain. MRI checklist done with family at bedside. Attempted to call MRI department, per message department was already closed for the day, directed to call  for on call technician. Transferred by  to on-call Ra marquez. Per tech MRI cannot be done unless the patient has no infusions running on an IV pump. Clarified that the patient is on a vasopressor and sedative and that I would use extension tubing as is the usual practice. Per tech, this is not allowed as the door would not close. Per the technician patient would only be able to have infusions that are by gravity, not on pump. Reiterated that we often use the extension tubing and that MRI is very important to determining treatment plan going forward. Per tech it cannot be done at this time. Dr. Uriostegui and Dr. Ray notified of inability to complete MRI at this time.     1700: Received call from [place] En route to hospital to do MRI at 1730. MRI will be completed with use of extension tubing.

## 2024-06-29 NOTE — RESPIRATORY THERAPY NOTE
RESPIRATORY THERAPY PATIENT TRANSPORT NOTE    Transported from: CCU ROOM 206  Transported to: CT    Patient is intubated?:yes  Transport ventilator used?:yes  Resuscitation bag used during transport?:no   ETT placement and ventilator function were verified post-transport. Yes

## 2024-06-29 NOTE — PROGRESS NOTES
[facility]  [address], [address]  [facility] [address]  [phone]          INPATIENT NEUROLOGY   FOLLOW UP CONSULT NOTE       [facility]  part of [facility]    Report of Consultation    Calos Hernandez Patient Status:  Inpatient     1980 MRN [id number]    Location [facility] 2W/SW Attending Bernardino Humphries MD    Hosp Day # 2 PCP No primary care provider on file.      Date of Admission:  2024  Date of Consult Follow Up:  2024        INTERVAL HPI:   -Overnight had clinical worsening with fixed dilated pupils and was found to have cerebral edema on imaging.  She was intubated yesterday.        ?PHYSICAL EXAM:     /83 (BP Location: Left arm)   Pulse 103   Temp 98.6 °F (37 °C) (Temporal)   Resp 18   Ht 67\"   Wt 126 lb 12.2 oz (57.5 kg)   LMP 2024   SpO2 100%   BMI 19.85 kg/m²   General appearance: Well appearing, and in no acute distress  Skin: skin color, texture normal.  No rashes or lesions.    Head: Normocephalic, atraumatic.    Neurological exam:  Does not awaken, globally aphasic.  Fixed dilated pupils.  No withdrawal of any extremities.  No overt facial droop.        LABS/DATA:    Lab Results   Component Value Date    WBC 5.2 2024    HGB 9.2 2024    HCT 28.0 2024    .0 2024    CREATSERUM 0.71 2024    BUN 18 2024     2024    K 3.6 2024    K 3.6 2024     2024    CO2 19.0 2024     2024    CA 7.5 2024    ALB 2.5 2024    ALKPHO 283 2024    BILT 0.8 2024    TP 6.4 2024    AST 64 2024    ALT 35 2024    MG 1.9 2024    PHOS 2.1 2024       HGBA1C:    Lab Results   Component Value Date    A1C 5.7 (H) 2024     2024              IMAGING:    MRI BRAIN (W+WO) (CPT=70553)    Result Date: 2024  CONCLUSION:  1. Cortical and subcortical T2/FLAIR hyperintense  signal abnormality involving the right mesial temporal lobe with corresponding nodular enhancement and equivocal associated diffusion restriction.  Primary differential considerations include sequelae of infectious encephalitis (such as HSV encephalitis), postictal phenomenon from recent seizure activity/status epilepticus, a nonspecific limbic/autoimmune encephalitis, or less likely neoplasm.  Lumbar puncture correlation is recommended.  In addition, post treatment follow-up imaging is recommended to assess evolution. 2. Chronic lacunar infarct in the left basal nuclei.  No other acute or subacute infarction. 3. Mild leptomeningeal/vascular hyperenhancement at the cerebellum could relate to vascular in cord or potentially meningitis. 4. Lesser incidental findings as above.   Results of this examination were discussed with the patient's physician, Dr. Howard, by Dr. Dahl at 1304 on 06/27/2024.  elm-remote  Dictated by (CST): Yomi Dahl MD on 6/27/2024 at 12:50 PM     Finalized by (CST): Yomi Dahl MD on 6/27/2024 at 1:05 PM          CTA BRAIN + CTV BRAIN (CPT=70496)    Result Date: 6/27/2024  CONCLUSION:  1. Negative CTA and CTV of brain.    Dictated by (CST): Rodriguez Stoner MD on 6/27/2024 at 9:13 AM     Finalized by (CST): Rodriguez Stoner MD on 6/27/2024 at 9:19 AM          CT BRAIN OR HEAD (22546)    Result Date: 6/27/2024  CONCLUSION:  1. No acute intracranial finding. 2. Small probably chronic left basal ganglionic lacunar infarct related to small vessel disease. 3. Divergent gaze. 4. No major discrepancy with preliminary Vision radiology report.    Dictated by (CST): Rodriguez Stoner MD on 6/27/2024 at 6:30 AM     Finalized by (CST): Rodriguez Stoner MD on 6/27/2024 at 6:34 AM          I PERSONALLY REVIEWED THESE IMAGES including CT brain that has not yet been finalized     ASSESSMENT:  The patient is a 43 year old woman with past medical history of hypertension who presented with a 1 week history of  progressively severe headaches vision changes with new seizure in our emergency department and fevers.  -CT brain, CTA and CTV were unremarkable  -EEG is normal  - MRI brain with and without T2/FLAIR hyperintensity in the right mesial temporal lobe with enhancement and possible diffusion restriction, cerebellar to meningeal/vascular hyperenhancement questionable vascular versus related to meningitis, chronic lacunar infarct in left basal ganglia  -CSF xanthochromic, 160 WBC lymphocyte predominant, 35 RBC, protein 321.9, glucose 36 which is low, pulmonary cultures negative, negative cryptococcal antigen  -, hemoglobin A1c is 5.7  - CT brain with cerebral edema, final read is pending  -Ammonia is not elevated     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  - Avoid hypertonic saline as she is hyponatremic to avoid developing central pontine myelinolysis     Left basal ganglia chronic lacunar infarct likely related to hyperlipidemia and hypertension  - Will need Aspirin for prevention of further strokes.  Elevated LFTs precludes statin use.  -Blood pressure goals: Normotensive     STROKE RISK FACTORS:   *Hypertension - Target blood pressure <140/90, <130/85 for high risk, normal 120/80  *Physical inactivity - target exercise at least 3 times per week  *Obesity - target ideal body weight and girth <35\" for women, <40\" for men  *Diabetes - Target <6.5-7%   *Smoking - Target smoking cessation  *Hyperlipidemia - Target total cholesterol < 200, Target LDL <100, < 70 for high risk, Target HDL >45 for men, >55 for women, Target triglycerides <150    Prognosis is guarded.  If no improvement in CT brain  imaging and/or exam in 24 hours, recommend goals of care discussions.      This note was prepared using Dragon Medical voice recognition dictation software and as a result, errors may occur. When identified, these errors have been corrected. While every attempt is made to correct errors during dictation, discrepancies may still exist    KAYDEN Uriostegui DO   Staff Neurologist   6/29/2024  9:12 AM

## 2024-06-29 NOTE — PROGRESS NOTES
"Daily Note     Today's date: 2023  Patient name: Larry Pozo  : 2012  MRN: [id number]  Referring provider: Peri Catalan  Dx:   Encounter Diagnosis     ICD-10-CM    1  Strain of right soleus muscle, initial encounter  S86 111A           Start Time: 1330  Stop Time: 1444  Total time in clinic (min): 74 minutes  130-215, not billed remainder    Subjective: Patient reports compliance with home program  Reports fatigue with balance with cone   She also reports an illness last week and was limited in conditioning and flipping with gymnastics  Objective: See treatment diary below      Assessment: Tolerated treatment well  Patient demonstrated fatigue post treatment, exhibited good technique with therapeutic exercises and would benefit from continued PT  Discussed BFR risks and benefits with patient and her mother  Patients mother consented to BFR treatment  Therefore, initiated BFR this visit  Patient fatigued with progression of BFR  Notable restriction in b/l achilles  Added nerve glides with myofascial decompression post EPAT  Plan: Continue per plan of care        Precautions: potential soleus/achilles strain      Manuals            EPAT (soleus b/l) avoid bony insertion of achilles  p 21Hz ceramx 1 4  p 21Hz ceramx 1 4                                                  Neuro Re-Ed             Eccentric calf raise 2-1 b/l 10 x 10\" 30, 3 x 15, b/l           SL RDL  30, 3 x 15                                                                            Ther Ex             SB stretch knee ext/knee flex 2 x 30\" ea            Cone  3 laps            minisquat             Step up             Legpress  BBFR 30 3 x 15, 35#                                                  Ther Activity                                       Gait Training                                       Modalities                                            " [facility]    Calos Hernandez Patient Status:  Inpatient    1980 MRN [id number]   Location [facility] 2W/SW Attending Bernardino Humphries MD   Hosp Day # 2 PCP No primary care provider on file.     Critical Care Progress Note     S: unresponsive with fixed dilated pupils, CT brain with worsening edema      OBJECTIVE:  Patient Vitals for the past 24 hrs:   BP Temp Temp src Pulse Resp SpO2 Weight   24 0700 129/83 -- -- 103 18 100 % --   24 0600 102/75 -- -- 106 14 100 % --   24 0500 (!) 83/51 98.6 °F (37 °C) Temporal 116 16 100 % --   24 0438 (!) 78/45 -- -- (!) 122 19 100 % --   24 0400 117/72 (!) 101.1 °F (38.4 °C) Temporal 113 21 100 % --   24 0357 -- -- -- -- -- -- 126 lb 12.2 oz (57.5 kg)   24 0300 (!) 150/93 -- -- 111 16 100 % --   24 0200 (!) 158/102 -- -- 116 14 100 % --   24 0100 (!) 151/98 -- -- 114 15 100 % --   24 0030 (!) 150/93 -- -- 115 18 100 % --   24 0000 (!) 166/109 99.4 °F (37.4 °C) Temporal (!) 123 14 100 % --   24 2300 (!) 162/106 -- -- 117 12 100 % --   24 2200 (!) 165/106 -- -- 114 15 100 % --   24 2115 (!) 159/106 -- -- 109 12 100 % --   24 2100 (!) 181/113 -- -- (!) 136 18 100 % --   24 (!) 177/114 97.7 °F (36.5 °C) Temporal (!) 139 23 100 % --   24 1849 (!) 151/100 -- -- 116 19 100 % --   24 1813 (!) 151/105 -- -- (!) 134 23 100 % --   24 1739 (!) 189/121 97.6 °F (36.4 °C) Temporal (!) 129 24 100 % --   24 1700 (!) 131/93 -- -- 83 19 100 % --   24 1556 98/75 97.6 °F (36.4 °C) Temporal 83 16 99 % --   24 1535 102/74 -- -- 86 16 100 % --   24 1525 103/76 -- -- 88 16 100 % --   24 1300 (!) 144/97 -- -- 104 24 93 % --   24 1200 (!) 142/104 96.6 °F (35.9 °C) Temporal 100 23 99 % --   24 1100 (!) 148/98 -- -- 102 24 94 % --   24 1000 (!) 155/106 -- -- 113 (!) 29 95 % --   24 0900 (!) 162/114 -- -- 102 (!) 29 100  % --        O2/Ventilator Settings: AC 12/500/50/5      Wt Readings from Last 3 Encounters:   06/29/24 126 lb 12.2 oz (57.5 kg)        I/O last 3 completed shifts:  In: 7171.5 [I.V.:5658.8; IV PIGGYBACK:1512.7]  Out: 2450 [Urine:2450]  No intake/output data recorded.     Physical Exam:   General: unresponsive   Lungs: coarse bilaterally   Heart: Regular rate and rhythm, normal S1S2, no murmur.   Abdomen: soft, non-tender, non-distended, positive BS.   Extremity: no edema   Skin: No rashes or lesions.       Lab Results   Component Value Date    WBC 5.2 06/29/2024    RBC 3.55 06/29/2024    HGB 9.2 06/29/2024    HCT 28.0 06/29/2024    MCV 78.9 06/29/2024    MCH 25.9 06/29/2024    MCHC 32.9 06/29/2024    RDW 15.2 06/29/2024    .0 06/29/2024     Lab Results   Component Value Date     06/29/2024    K 3.6 06/29/2024    K 3.6 06/29/2024     06/29/2024    CO2 19.0 06/29/2024    BUN 18 06/29/2024    CREATSERUM 0.71 06/29/2024     06/29/2024    CA 7.5 06/29/2024    ALKPHO 283 06/29/2024    ALT 35 06/29/2024    AST 64 06/29/2024    BILT 0.8 06/29/2024    ALB 2.5 06/29/2024    TP 6.4 06/29/2024    MG 1.9 06/29/2024             Imaging: Reviewed     ASSESSMENT/PLAN:     Acute hypoxic respiratory failure:  secondary to altered mental status and inability to protect airway due to meningoencephalitis, possible neurogenic pulmonary edema  -cont current vent  -propofol per neuro  -wean O2 as above  -lasix prn    Neuro:  Meningoencephalitis with worsening mental status and CT findings with edema today  Per neuro  -mannitol gtt  -propofol for seizure prophylaxis  ID: Meningoencephalitis   Per ID  Vanc/acylovir/rocephin 6/27 start.   GI   Elevated ggt and alkphos.   Normal RUQ us. ?cholestatsis.   Proph  Heparin subcutaneous  FEN  Tube feeding  Dispo:  poor prognosis given worsening today  Full code. . ICU monitoring.   Critical Care Time greater than: 90 minutes    Miller Carballo MD  6/29/2024  8:23 AM

## 2024-06-29 NOTE — PLAN OF CARE
Pt. Hypotensive, nocturnist paged for bolus then levo started, febrile, mentation change, not withdrawing from pain, neurology paged, stat ct done this morning.   Problem: Patient Centered Care  Goal: Patient preferences are identified and integrated in the patient's plan of care  Description: Interventions:  - What would you like us to know as we care for you?   - Provide timely, complete, and accurate information to patient/family  - Incorporate patient and family knowledge, values, beliefs, and cultural backgrounds into the planning and delivery of care  - Encourage patient/family to participate in care and decision-making at the level they choose  - Honor patient and family perspectives and choices  Outcome: Progressing     Problem: PAIN - ADULT  Goal: Verbalizes/displays adequate comfort level or patient's stated pain goal  Description: INTERVENTIONS:  - Encourage pt to monitor pain and request assistance  - Assess pain using appropriate pain scale  - Administer analgesics based on type and severity of pain and evaluate response  - Implement non-pharmacological measures as appropriate and evaluate response  - Consider cultural and social influences on pain and pain management  - Manage/alleviate anxiety  - Utilize distraction and/or relaxation techniques  - Monitor for opioid side effects  - Notify MD/LIP if interventions unsuccessful or patient reports new pain  - Anticipate increased pain with activity and pre-medicate as appropriate  Outcome: Progressing     Problem: SAFETY ADULT - FALL  Goal: Free from fall injury  Description: INTERVENTIONS:  - Assess pt frequently for physical needs  - Identify cognitive and physical deficits and behaviors that affect risk of falls.  - [place] fall precautions as indicated by assessment.  - Educate pt/family on patient safety including physical limitations  - Instruct pt to call for assistance with activity based on assessment  - Modify environment to reduce risk of  injury  - Provide assistive devices as appropriate  - Consider OT/PT consult to assist with strengthening/mobility  - Encourage toileting schedule  Outcome: Progressing     Problem: DISCHARGE PLANNING  Goal: Discharge to home or other facility with appropriate resources  Description: INTERVENTIONS:  - Identify barriers to discharge w/pt and caregiver  - Include patient/family/discharge partner in discharge planning  - Arrange for needed discharge resources and transportation as appropriate  - Identify discharge learning needs (meds, wound care, etc)  - Arrange for interpreters to assist at discharge as needed  - Consider post-discharge preferences of patient/family/discharge partner  - Complete POLST form as appropriate  - Assess patient's ability to be responsible for managing their own health  - Refer to Case Management Department for coordinating discharge planning if the patient needs post-hospital services based on physician/LIP order or complex needs related to functional status, cognitive ability or social support system  Outcome: Progressing     Problem: CARDIOVASCULAR - ADULT  Goal: Maintains optimal cardiac output and hemodynamic stability  Description: INTERVENTIONS:  - Monitor vital signs, rhythm, and trends  - Monitor for bleeding, hypotension and signs of decreased cardiac output  - Evaluate effectiveness of vasoactive medications to optimize hemodynamic stability  - Monitor arterial and/or venous puncture sites for bleeding and/or hematoma  - Assess quality of pulses, skin color and temperature  - Assess for signs of decreased coronary artery perfusion - ex. Angina  - Evaluate fluid balance, assess for edema, trend weights  Outcome: Progressing     Problem: NEUROLOGICAL - ADULT  Goal: Achieves stable or improved neurological status  Description: INTERVENTIONS  - Assess for and report changes in neurological status  - Initiate measures to prevent increased intracranial pressure  - Maintain blood  pressure and fluid volume within ordered parameters to optimize cerebral perfusion and minimize risk of hemorrhage  - Monitor temperature, glucose, and sodium. Initiate appropriate interventions as ordered  Outcome: Progressing  Goal: Absence of seizures  Description: INTERVENTIONS  - Monitor for seizure activity  - Administer anti-seizure medications as ordered  - Monitor neurological status  Outcome: Progressing  Goal: Remains free of injury related to seizure activity  Description: INTERVENTIONS:  - Maintain airway, patient safety  and administer oxygen as ordered  - Monitor patient for seizure activity, document and report duration and description of seizure to MD/LIP  - If seizure occurs, turn patient to side and suction secretions as needed  - Reorient patient post seizure  - Seizure pads on all 4 side rails  - Instruct patient/family to notify RN of any seizure activity  - Instruct patient/family to call for assistance with activity based on assessment  Outcome: Progressing     Problem: RESPIRATORY - ADULT  Goal: Achieves optimal ventilation and oxygenation  Description: INTERVENTIONS:  - Assess for changes in respiratory status  - Assess for changes in mentation and behavior  - Position to facilitate oxygenation and minimize respiratory effort  - Oxygen supplementation based on oxygen saturation or ABGs  - Provide Smoking Cessation handout, if applicable  - Encourage broncho-pulmonary hygiene including cough, deep breathe, Incentive Spirometry  - Assess the need for suctioning and perform as needed  - Assess and instruct to report SOB or any respiratory difficulty  - Respiratory Therapy support as indicated  - Manage/alleviate anxiety  - Monitor for signs/symptoms of CO2 retention  Outcome: Progressing     Problem: GENITOURINARY - ADULT  Goal: Absence of urinary retention  Description: INTERVENTIONS:  - Assess patient’s ability to void and empty bladder  - Monitor intake/output and perform bladder scan as  needed  - Follow urinary retention protocol/standard of care  - Consider collaborating with pharmacy to review patient's medication profile  - Implement strategies to promote bladder emptying  Outcome: Progressing

## 2024-06-29 NOTE — PROGRESS NOTES
Critical access hospital Pharmacy Note:  Renal Adjustment for metronidazole (FLAGYL)    Calos Hernandez is a 43 year old patient who has been prescribed metronidazole (FLAGYL) 500 mg every 8 hrs.  The estimated creatinine clearance is 90.8 mL/min (based on SCr of 0.71 mg/dL). The dose has been adjusted to metronidazole (FLAGYL) 500 mg every 6 hrs per hospital renal dose adjustment protocol for treatment of CNS infection.  Pharmacy will follow and adjust dose as warranted for additional renal function changes.    Thank you,    Regina Emerson, PharmD  6/29/2024  10:32 AM

## 2024-06-29 NOTE — PLAN OF CARE
Problem: RESPIRATORY - ADULT  Goal: Achieves optimal ventilation and oxygenation  Description: INTERVENTIONS:  - Assess for changes in respiratory status  - Assess for changes in mentation and behavior  - Position to facilitate oxygenation and minimize respiratory effort  - Oxygen supplementation based on oxygen saturation or ABGs  - Provide Smoking Cessation handout, if applicable  - Encourage broncho-pulmonary hygiene including cough, deep breathe, Incentive Spirometry  - Assess the need for suctioning and perform as needed  - Assess and instruct to report SOB or any respiratory difficulty  - Respiratory Therapy support as indicated  - Manage/alleviate anxiety  - Monitor for signs/symptoms of CO2 retention  Outcome: Progressing     Pt remains intubated with ETT size 7.0 at 23 cm t the lip. Pt is stable and saturating appropriately, FiO2 wean down to 40%, suction as needed. Pt went to CT scan and MRI today, with no acute events during transport or test.    Vent settings and readings as follow:       06/29/24 1525   Vent Information   Is this patient on Chronic Ventilation? No   Interface Invasive   Vent Type O  ([place])   Vent plugged into main power? Yes   Vent ID    Vent Mode VC/AC   Settings   FiO2 (%) (50 %) 40%   Resp Rate (Set) 12   Vt (Set, mL) 500 mL   Waveform Decelerating ramp   PEEP/CPAP (cm H2O) 5 cm H20   Insp Time (sec) 1 sec   Trigger Sensitivity Flow (L/min) 2 L/min   Humidification Heater   H2O Bag Level 1/2 Full   Heater Temperature 97.2 °F (36.2 °C)   Readings   Total RR 12   Minute Ventilation (L/min) 5.5 L/min   Expiratory Tidal Volume 461 mL   PIP Observed (cm H2O) 23 cm H2O   MAP (cm H2O) 8   I/E Ratio 1:4.0   Plateau Pressure (cm H2O) 18 cm H2O   Static Compliance (L/cm H2O) 40   Dynamic Compliance (L/cm H2O) 30 L/cm H2O   Alarms   High RR 40   Low RR 10   Insp Pressure High (cm H2O) 45 cm H2O   Insp Pressure Low (cm H2O) 10 cm H2O   MV High (L/min) 20 L/min   MV Low (L/min) 3  L/min   Apnea Interval (sec) 20 seconds   Apnea Rate 12   Apnea Volume (mL) 500 mL      06/29/24 1525   Respiratory Assessment   Assessment Type Assess only   Respiratory Pattern Regular   Chest Assessment Chest expansion symmetrical   Cough Non-productive;None   Bilateral Breath Sounds Diminished   ETT   Placement Date: 06/28/24   Airway Size: 7 mm  Cuffed: Cuffed  Insertion attempts: 1  Placement Verification: Colorimetric EtCO2 device  Placed By: ICU physician   Secured at (cm) 23 cm   Suctioned? Y   Measured From Lips   Secured Location Center   Secured by Commercial tube myers   Req'd equipment at bedside Bag mask   Additional Assessments   Pulse 84   Resp 12   SpO2 100 %       At 19:20 FiO2 wean down to 30%.

## 2024-06-29 NOTE — PLAN OF CARE
Restraints continued for patient safety.     Problem: Safety Risk - Non-Violent Restraints  Goal: Patient will remain free from self-harm  Description: INTERVENTIONS:  - Apply the least restrictive restraint to prevent harm  - Notify patient and family of reasons restraints applied  - Assess for any contributing factors to confusion (electrolyte disturbances, delirium, medications)  - Discontinue any unnecessary medical devices as soon as possible  - Assess the patient's physical comfort, circulation, skin condition, hydration, nutrition and elimination needs   - Reorient and redirection as needed  - Assess for the need to continue restraints  Outcome: Not Progressing

## 2024-06-29 NOTE — PLAN OF CARE
NEUROLOGY PLAN OF CARE  Paged about fixed dilated pupils, CT brain with edema, likely related to underlying menigoencephalitis.  Patient is hyponatremic, precluding use of hypertonic saline due to risk of developing osmotic demyelination.  Will start mannitol.   Avoid steroids.   Repeat CT brain in 6 hours.   Head of bed 30 degrees  Recommend keeping on Propofol gtt for 24 hours without sedation trials.   Formal note to follow.    ADDI Uriostegui DO  6/29/2024 7:48 AM

## 2024-06-29 NOTE — PROGRESS NOTES
Neurology plan of care note    Spoke to radiology.    CT Brain this afternoon with severe diffuse hypoxic injury - MRI is being done to confirm. This explains her fixed dilated pupils and poor neurological exam.     These findings were not seen on CT brain this morning despite her clinical development of pupillary change overnight. Suspect the CT imaging findings lagged behind her clinical change.     Will await MRI but these CT findings already show diffuse severe irreversible injury with grim prognosis for meaningful neurological recovery.    Recommend palliative care and hospice evaluation.     SK Moody DO

## 2024-06-29 NOTE — PROGRESS NOTES
[facility] Hospitalist Progress Note     CC: Hospital Follow up    PCP: No primary care provider on file.       Assessment/Plan:   Patient is a 43-year-old female with no known medical history, who presents to the hospital for evaluation of headaches and feeling ill.  Being encephalopathic with meningitis/encephalitis as the likely cause.  6/28/2024 patient was intubated for airway protection.     Sepsis  Encephalopathy  Meningitis/Encephalitis  -source unclear, poor dentition noted, tooth pain this week? -headaches prior to admission. Aspiration?  -MRI brain with possible encephalitis changes, possible HSV?  -on cefepime, vancomycin, acyclovir, flagyl  -IVIG initiated, per neuro  -LP done on 6/27 appreciate neurology eval   -continue rate of fluids (she is also on acyclovir, for renal protection).   -ID, and neuro consult  -6/29/24 with fixed dilated pupils, CTH with brain edema   -mannitol per neuro    -Keppra and propofol for seizure pxx      Acute respiratory failure  -intubated 6/28/24  -CXR, ABG reviewed   -GI pxx with Pepcid   -critical care following       Seizure  -now on keppra  -EEG normal   -neuro consult   -propofol without sedation holiday for now      Tachycardia  -sinus tachycardia, secondary to sepsis      Elevated liver enzymes  -alk phos high  -bili high  -borderline ast/alt  -ammonia normal   -trend CMP   -RUQ ultrasound noted      Hyponatremia  -monitor sodium closely   -0.9     Fluids: 0.9 saline   Diet: NPO  DVT prophylaxis: heparin sub q  Code status: FULL      Critical care time 35 minutes     Bernardino Humphries MD   [facility] Hospitalist        Subjective:     Admitted and sedated.    OBJECTIVE:    Blood pressure 101/62, pulse 81, temperature 98.6 °F (37 °C), temperature source Temporal, resp. rate 12, height 5' 7\" (1.702 m), weight 126 lb 12.2 oz (57.5 kg), last menstrual period 06/27/2024, SpO2 100%.    Temp:  [96.6 °F (35.9 °C)-101.1 °F (38.4 °C)] 98.6 °F (37 °C)  Pulse:   [] 81  Resp:  [12-24] 12  BP: ()/() 101/62  SpO2:  [93 %-100 %] 100 %  FiO2 (%):  [50 %-60 %] 50 %      Intake/Output:    Intake/Output Summary (Last 24 hours) at 6/29/2024 1113  Last data filed at 6/29/2024 0357  Gross per 24 hour   Intake 4150.83 ml   Output 1275 ml   Net 2875.83 ml       Last 3 Weights   06/29/24 0357 126 lb 12.2 oz (57.5 kg)   06/27/24 0704 121 lb 4.1 oz (55 kg)   06/27/24 0042 92 lb (41.7 kg)       /62 (BP Location: Right arm)   Pulse 81   Temp 98.6 °F (37 °C) (Temporal)   Resp 12   Ht 5' 7\" (1.702 m)   Wt 126 lb 12.2 oz (57.5 kg)   LMP 06/27/2024   SpO2 100%   BMI 19.85 kg/m²   General: Alert, no acute distress  HEENT: oral mucosa dry blood, poor dentition, ET tube in place  Neck: non tender, no adenopathy   Lungs: Mechanical breath sounds  Heart: Tachycardic  Abdomen: soft, non tender, non distended   Extremities: No edema  Skin: no new rash, normal color  Neuro: Fixed dilated pupils, no gag reflex     Data Review:       Labs:     Recent Labs   Lab 06/27/24  0717 06/28/24  0653 06/29/24  0542   RBC 4.54 4.52 3.55*   HGB 11.7* 11.8* 9.2*   HCT 36.1 35.0 28.0*   MCV 79.5* 77.4* 78.9*   MCH 25.8* 26.1 25.9*   MCHC 32.4 33.7 32.9   RDW 15.1* 14.7 15.2*   NEPRELIM 5.73 5.84 4.53   WBC 7.5 7.0 5.2   .0 280.0 280.0         Recent Labs   Lab 06/28/24  0208 06/28/24  0653 06/29/24  0542   * 110* 102*   BUN 10 9 18   CREATSERUM 0.60 0.50* 0.71   EGFRCR 113 118 107   CA 7.7* 8.0* 7.5*   * 130* 132*   K 3.4* 3.9 3.6  3.6   CL 99 98 104   CO2 25.0 24.0 19.0*       Recent Labs   Lab 06/27/24  0123 06/28/24  0653 06/29/24  0542   ALT 60*  56* 48 35   AST 77*  76* 78* 64*   ALB 4.4  4.2 3.3 2.5*         Imaging:  XR CHEST AP PORTABLE  (CPT=71045)    Result Date: 6/28/2024  CONCLUSION:  1. Normal heart size with prominent pulmonary vascularity and mild interstitial edema may suggest developing cardiac failure/fluid overload.  Development of moderate  bibasilar alveolar airspace disease suggesting either bibasilar pneumonia, or pulmonary edema pattern.  Small left pleural effusion suggested.  ET tube in the trachea the level of the clavicles.  No pneumothorax.    Dictated by (CST): Fernie Gonzalez MD on 6/28/2024 at 4:31 PM     Finalized by (CST): Fernie Gonzalez MD on 6/28/2024 at 4:35 PM          XR CHEST AP PORTABLE  (CPT=71045)    Result Date: 6/27/2024  CONCLUSION:  1. Prominence to the bronchovascular markings may be related to a combination of semi upright technique, and possibly bronchitis.  No evidence of pneumonia.    Dictated by (CST): Rodriguez Stoner MD on 6/27/2024 at 1:27 PM     Finalized by (CST): Rodriguez Stoner MD on 6/27/2024 at 1:30 PM          MRI BRAIN (W+WO) (CPT=70553)    Result Date: 6/27/2024  CONCLUSION:  1. Cortical and subcortical T2/FLAIR hyperintense signal abnormality involving the right mesial temporal lobe with corresponding nodular enhancement and equivocal associated diffusion restriction.  Primary differential considerations include sequelae of infectious encephalitis (such as HSV encephalitis), postictal phenomenon from recent seizure activity/status epilepticus, a nonspecific limbic/autoimmune encephalitis, or less likely neoplasm.  Lumbar puncture correlation is recommended.  In addition, post treatment follow-up imaging is recommended to assess evolution. 2. Chronic lacunar infarct in the left basal nuclei.  No other acute or subacute infarction. 3. Mild leptomeningeal/vascular hyperenhancement at the cerebellum could relate to vascular in cord or potentially meningitis. 4. Lesser incidental findings as above.   Results of this examination were discussed with the patient's physician, Dr. Howard, by Dr. Dahl at 1304 on 06/27/2024.  elm-remote  Dictated by (CST): Yomi Dahl MD on 6/27/2024 at 12:50 PM     Finalized by (CST): Yomi Dahl MD on 6/27/2024 at 1:05 PM          CTA BRAIN + CTV BRAIN (CPT=70496)    Result  Date: 6/27/2024  CONCLUSION:  1. Negative CTA and CTV of brain.    Dictated by (CST): Rodriguez Stoner MD on 6/27/2024 at 9:13 AM     Finalized by (CST): Rodriguez Stoner MD on 6/27/2024 at 9:19 AM          CT BRAIN OR HEAD (57006)    Result Date: 6/27/2024  CONCLUSION:  1. No acute intracranial finding. 2. Small probably chronic left basal ganglionic lacunar infarct related to small vessel disease. 3. Divergent gaze. 4. No major discrepancy with preliminary [facility] radiology report.    Dictated by (CST): Rodriguez Stoner MD on 6/27/2024 at 6:30 AM     Finalized by (CST): Rodriguez Stoner MD on 6/27/2024 at 6:34 AM             Meds:      mannitol (Osmitrol) 25 g in 125 mL infusion  25 g Intravenous Q6H    sodium phosphate  15 mmol Intravenous Once    metRONIDAZOLE  500 mg Intravenous q6h    levETIRAcetam  500 mg Intravenous Q12H    aspirin  300 mg Rectal Daily    famotidine  20 mg Intravenous BID    chlorhexidine gluconate  15 mL Mouth/Throat BID@0800,2000    acyclovir  10 mg/kg Intravenous Q8H    vancomycin  1,000 mg Intravenous Q12H    cefepime  2 g Intravenous Q8H    heparin  5,000 Units Subcutaneous q12h      norepinephrine 5 mcg/min (06/29/24 1020)    propofol 20 mcg/kg/min (06/29/24 0916)    sodium chloride 83 mL/hr at 06/29/24 0009       labetalol    fentaNYL **OR** fentaNYL    polyethylene glycol (PEG 3350)    senna    bisacodyl    fleet enema    acetaminophen    ondansetron    prochlorperazine    LORazepam    acetaminophen    hydrALAzine

## 2024-06-29 NOTE — RESPIRATORY THERAPY NOTE
Patient received on ventilator. Patient requires ventilator support and is not a candidate for SBT. Bilateral breath sounds auscultated. Suction provided when indicated. No acute events. RT will continue to monitor.       06/29/24 0438   Vent Information   Vent Mode VC/AC   Settings   FiO2 (%) 50 %   Resp Rate (Set) 12   Vt (Set, mL) 500 mL   Waveform Decelerating ramp   PEEP/CPAP (cm H2O) 5 cm H20

## 2024-06-29 NOTE — PLAN OF CARE
Problem: RESPIRATORY - ADULT  Goal: Achieves optimal ventilation and oxygenation  Description: INTERVENTIONS:  - Assess for changes in respiratory status  - Assess for changes in mentation and behavior  - Position to facilitate oxygenation and minimize respiratory effort  - Oxygen supplementation based on oxygen saturation or ABGs  - Provide Smoking Cessation handout, if applicable  - Encourage broncho-pulmonary hygiene including cough, deep breathe, Incentive Spirometry  - Assess the need for suctioning and perform as needed  - Assess and instruct to report SOB or any respiratory difficulty  - Respiratory Therapy support as indicated  - Manage/alleviate anxiety  - Monitor for signs/symptoms of CO2 retention  Outcome: Progressing    Patient received on full vent support of VC/AC 12/500/+5/50%  ETT 7.0/23 @ the lip, kristan. Breath sound auscultated and suction as needed, patient is not candidate for SBT today, no other changes/acute events on this shift, sedated, RT will continue to monitor the pt.

## 2024-06-29 NOTE — PROGRESS NOTES
PCCU  Critical Care APRN Progress Note    NAME: Calos Hernandez - ROOM: -A - MRN: [id number] - Age: 43 year old - :1980    PICC line attempted via RUE midline over the wire using aseptic technique, but PICC crossed to left subclavian and despite attempts and limitation of HOB, attempt to position line into SVC/CAJ not successful.  Line removed and replaced in LUE with ease of placement and ECG confirmation at CAJ.  All port flushed with good blood return noted.     OBJECTIVE  Vitals:  /62 (BP Location: Right arm)   Pulse 81   Temp 98.6 °F (37 °C) (Temporal)   Resp 12   Ht 170.2 cm (5' 7\")   Wt 126 lb 12.2 oz (57.5 kg)   LMP 2024   SpO2 100%   BMI 19.85 kg/m²                  Zulay Lemus APRN/CNP  Critical Care  2024   1:37 PM

## 2024-06-29 NOTE — PROGRESS NOTES
[facility]  part of [facility] Infectious Disease Consult    Calos Hernandez Patient Status:  Inpatient    1980 MRN [id number]   Location [facility] 2W/SW Attending Alejandro Bob,    Hosp Day # 2 PCP No primary care provider on file.       Calos Hernandez seen and examined,   Afebrile,  Intubated now,   Getting CT brain   Not interactive,     History:  History reviewed. No pertinent past medical history.  History reviewed. No pertinent surgical history.  No family history on file.       Allergies:  Not on File    Medications:    Current Facility-Administered Medications:     labetalol (Trandate) 5 mg/mL injection 10 mg, 10 mg, Intravenous, Q6H PRN    norepinephrine (Levophed) 4 mg/250mL infusion premix, 0.5-30 mcg/min, Intravenous, Continuous    mannitol (Osmitrol) 25 g in 125 mL infusion, 25 g, Intravenous, Q6H    sodium phosphate 15 mmol in 0.9% NaCl 100mL IVPB premix, 15 mmol, Intravenous, Once    metRONIDAZOLE in sodium chloride 0.79% (Flagyl) 5 mg/mL IVPB premix 500 mg, 500 mg, Intravenous, q6h    levETIRAcetam (Keppra) 500 mg/5mL injection 500 mg, 500 mg, Intravenous, Q12H    aspirin 300 MG rectal suppository 300 mg, 300 mg, Rectal, Daily    famotidine (Pepcid) 20 mg/2mL injection 20 mg, 20 mg, Intravenous, BID    fentaNYL (Sublimaze) 50 mcg/mL injection 25 mcg, 25 mcg, Intravenous, Q30 Min PRN **OR** fentaNYL (Sublimaze) 50 mcg/mL injection 50 mcg, 50 mcg, Intravenous, Q30 Min PRN    polyethylene glycol (PEG 3350) (Miralax) 17 g oral packet 17 g, 17 g, Oral, Daily PRN    senna (Senokot) 8.8 MG/5ML oral syrup 17.6 mg, 10 mL, Oral, Nightly PRN    bisacodyl (Dulcolax) 10 MG rectal suppository 10 mg, 10 mg, Rectal, Daily PRN    fleet enema (Fleet) 7-19 GM/118ML rectal enema 133 mL, 1 enema, Rectal, Once PRN    chlorhexidine gluconate (Peridex) 0.12 % oral solution 15 mL, 15 mL, Mouth/Throat, BID@0800,2000    propofol (Diprivan) 10 mg/mL infusion premix, 5-50 mcg/kg/min  (Dosing Weight), Intravenous, Continuous    acetaminophen (Tylenol Extra Strength) tab 500 mg, 500 mg, Oral, Q4H PRN    ondansetron (Zofran) 4 MG/2ML injection 4 mg, 4 mg, Intravenous, Q6H PRN    prochlorperazine (Compazine) 10 MG/2ML injection 5 mg, 5 mg, Intravenous, Q8H PRN    acyclovir (Zovirax) 550 mg in sodium chloride 0.9% 100 mL IVPB, 10 mg/kg, Intravenous, Q8H    vancomycin (Vancocin) 1,000 mg in sodium chloride 0.9% 250 mL IVPB-ADDV, 1,000 mg, Intravenous, Q12H    LORazepam (Ativan) 2 mg/mL injection 2 mg, 2 mg, Intravenous, Q10 Min PRN    ceFEPIme (Maxpime) 2 g in sodium chloride 0.9% 100 mL IVPB-MBP, 2 g, Intravenous, Q8H    heparin (Porcine) 5000 UNIT/ML injection 5,000 Units, 5,000 Units, Subcutaneous, q12h    acetaminophen (Ofirmev) 10 mg/mL infusion premix 1,000 mg, 1,000 mg, Intravenous, Q6H PRN    hydrALAzine (Apresoline) 20 mg/mL injection 10 mg, 10 mg, Intravenous, Q6H PRN    Review of Systems:   Constitutional: Negative for anorexia, chills, fatigue, fevers, malaise, night sweats and weight loss.  Eyes: Negative for visual disturbance, irritation and redness.  Ears, nose, mouth, throat, and face: Negative for hearing loss, tinnitus, nasal congestion, snoring, sore throat, hoarseness and voice change.  Respiratory: Negative for cough, sputum, hemoptysis, chest pain, wheezing, dyspnea on exertion, or stridor.  Cardiovascular: Negative for chest pain, palpitations, irregular heart beats, syncope, fatigue, orthopnea, paroxysmal nocturnal dyspnea, lower extremity edema.  Gastrointestinal: Negative for dysphagia, odynophagia, reflux symptoms, nausea, vomiting, change in bowel habits, diarrhea, constipation and abdominal pain.  Integument/breast: Negative for rash, skin lesions, and pruritus.  Hematologic/lymphatic: Negative for easy bruising, bleeding, and lymphadenopathy.  Musculoskeletal: Negative for myalgias, arthralgias, muscle weakness.  Neurological: Negative for headaches, dizziness, seizures,  memory problems, trouble swallowing, speech problems, gait problems and weakness.  Behavioral/Psych: Negative for active tobacco use.  Endocrine: No history of of diabetes, thyroid disorder.  Unable to obtain,     Vital signs in last 24 hours:  Patient Vitals for the past 24 hrs:   BP Temp Temp src Pulse Resp SpO2 Height Weight   06/27/24 1000 (!) 164/103 -- -- (!) 121 (!) 31 100 % -- --   06/27/24 0900 (!) 151/122 -- -- 102 (!) 46 98 % -- --   06/27/24 0800 157/83 99.4 °F (37.4 °C) Temporal 100 25 97 % -- --   06/27/24 0704 -- -- -- -- -- -- 5' 7\" (1.702 m) 121 lb 4.1 oz (55 kg)   06/27/24 0700 (!) 163/88 -- -- 113 22 98 % -- --   06/27/24 0646 -- (!) 101.1 °F (38.4 °C) Temporal -- -- -- -- --   06/27/24 0600 (!) 168/87 -- -- 94 25 97 % -- --   06/27/24 0529 -- (!) 102.5 °F (39.2 °C) Rectal -- -- -- -- --   06/27/24 0500 (!) 155/97 -- -- (!) 124 (!) 28 97 % -- --   06/27/24 0445 (!) 157/95 -- -- 118 (!) 35 100 % -- --   06/27/24 0400 -- -- -- 81 (!) 35 100 % -- --   06/27/24 0230 (!) 165/97 -- -- 91 (!) 36 100 % -- --   06/27/24 0145 (!) 189/98 -- -- 115 25 96 % -- --   06/27/24 0122 -- 98.8 °F (37.1 °C) Temporal -- -- -- -- --   06/27/24 0042 -- -- -- -- -- -- -- 92 lb (41.7 kg)   06/27/24 0041 (!) 176/78 98.2 °F (36.8 °C) Temporal 99 18 99 % -- --       Physical Exam:   General: alert, cooperative, oriented.  No respiratory distress.   Head: Normocephalic, without obvious abnormality, atraumatic.   Eyes: Conjunctivae/corneas clear.  No scleral icterus.  No conjunctival     hemorrhage.   Nose: Nares normal.   Throat: Lips, mucosa, and tongue normal.  No thrush noted.   Neck: Soft, supple neck; trachea midline, no adenopathy, no thyromegaly.   Lungs: CTAB, normal and equal bilateral chest rise   Chest wall: No tenderness or deformity.   Heart: Regular rate and rhythm, normal S1S2, no murmur.   Abdomen: soft, non-tender, non-distended, no masses, no guarding, no     rebound, positive BS.   Extremity: no edema, no  cyanosis   Skin: No rashes or lesions.   Neurological: Alert, interactive, no focal deficits    Labs:  Lab Results   Component Value Date    WBC 5.2 06/29/2024    HGB 9.2 06/29/2024    HCT 28.0 06/29/2024    .0 06/29/2024    CREATSERUM 0.53 06/29/2024    BUN 14 06/29/2024     06/29/2024    K 3.5 06/29/2024     06/29/2024    CO2 20.0 06/29/2024     06/29/2024    CA 6.9 06/29/2024    ALB 2.5 06/29/2024    ALKPHO 283 06/29/2024    BILT 0.8 06/29/2024    TP 6.4 06/29/2024    AST 64 06/29/2024    ALT 35 06/29/2024    MG 1.9 06/29/2024    PHOS 2.1 06/29/2024       Radiology:  Reviewed,       Cultures:  Reviewed,     Assessment and Plan:    1.  Syndrome of Fever, headache and AMS:   - Complained of toothache at home, headaches for few days, had a Sz in the ER waiting room,   - On supplemental O2 and being monitored for airway protection,   - Differential includes meningoencephalitis with possible aspiration,   - CSF with , glucose 36 and Protein 321, follow Cul, MEP negative for HSV I & II.   - MRI of head reviewed, hyperintense abn in temporal lobe,  - Blood cul are NGTD  - Strep Pneumo antigen in urine is negative,  - Crypto Ag is negative - has a bird at home,    -Continue IV Vanc and Acyclcovir, IV Cefepime, Flagyl, d # 3,     2.   Acute hypoxic resp failure: Intubated for airway protection,   - Thierry purulent secretions during intubation, will cul,   - Will also get RVP,     3.    Disposition: inhouse, a middle aged female admitted with headache and AMS with fever and a new Sz, Possible Meningoencephalitis, although HSV PCR negative in CSF, enhancement of temporal lobe, will continue acyclovir,   - Follow pending cul,   - Sputum cul, RVP,   - Continue IV Vanc and Acyclovir, IV Cefepime with Flagyl,   - Supportive care,     Discussed with family,  all questions answered, further recommendations to follow, Thanks,  Thank you for consulting [facility] for Calos Hernandez.  If you have any  questions or concerns please call [facility] Infectious Disease at [phone].     Michael Camejo MD  6/27/2024  12:52 PM

## 2024-06-29 NOTE — DIETARY NOTE
Follow up  Nutrition Note         6/29/2024:  Received MD consult to initiate/manage EN.   No tube access yet for EN to initiate. Intubate 6/28. Sedated on propofol. On Levophed. Infusing IV NS @ 83ml/hr.     Will check in tomorrow for access and EN start.       RD will continue to follow up.       Narcisa Gonzalez RD, LDN, [facility]  Clinical Dietitian  [phone]  6/29/2024

## 2024-06-30 NOTE — PROGRESS NOTES
[facility] Pharmacy Dosing Service      Follow Up Pharmacokinetic Consult for Vancomycin Dosing     Calos Hernandez is a 43 year old female who is receiving vancomycin therapy for CNS infection. Patient is on day 4 of vancomycin and is currently receiving 1000 mg IV every 12 hours. The current treatment and monitoring approach is steady state AUC strategy.        Weight and Temperature:    Wt Readings from Last 1 Encounters:   24 53.8 kg (118 lb 9.7 oz)         Temp Readings from Last 1 Encounters:   24 98.4 °F (36.9 °C) (Temporal)      Labs:   Recent Labs   Lab 24  1630 24  0340 24  0434   CREATSERUM 0.64 0.73 0.81      Estimated Creatinine Clearance: 74.5 mL/min (based on SCr of 0.81 mg/dL).     Recent Labs   Lab 24  0653 24  0542 24  0434   WBC 7.0 5.2 3.9*        Vancomycin Levels:  Lab Results   Component Value Date/Time    VANCT 15.5 2024 04:34 AM       Corresponding 24 h-AUC: N/A     The Pharmacokinetic Target is:    Trough/random 10-15 mg/L    Renal Dosing Considerations:    None     Assessment/Plan:   Vancomycin peak was not obtained as ordered.  Based on vancomycin trough alone, maintenance regimen: Continue vancomycin 1000 mg IV every 12 hours    Monitorin) Plan for vancomycin trough to be obtained in 5 - 7 days    2) Pharmacy will order SCr as clinically indicated to assess renal function.    3) Pharmacy will monitor for toxicity and efficacy, adjust vancomycin dose and/or frequency, and order vancomycin levels as appropriate per the Pharmacy and Therapeutics Committee approved protocol until discontinuation of the medication.       We appreciate the opportunity to assist in the care of this patient.     Regina Emerson, PharmD  2024  8:15 AM  [facility] Pharmacy Extension: [phone]

## 2024-06-30 NOTE — DIETARY NOTE
Follow up  Nutrition Note      6/30/2024 Update:    EN initiation deferred, pt metabolically unstable, hence not safe to commence EN. And, currently no tube access to utilize   RN reports waiting for family to arrive, with plan to discuss Goals of care.  Nutrition support in future pending outcome of goal of care and decisions.         6/29/2024:  Received MD consult to initiate/manage EN (Enteral Nutrition)   No tube access yet for EN to initiate. Intubate 6/28. Sedated on propofol. On Levophed. Infusing IV NS @ 83ml/hr.   Will check in tomorrow for access and EN start.             RD will continue to follow up.       Narcisa Gonzalez, RD, LDN, [facility]  Clinical Dietitian  [phone]  6/29/2024

## 2024-06-30 NOTE — RESPIRATORY THERAPY NOTE
Pt on current vent settings VC/12/500/+5/30% ; ETT 7.0 @ 23 cm  Suctioned pt as needed throughout the day.  Pt tolerating and saturating appropriately.  No acute changes, RT to continue to monitor.

## 2024-06-30 NOTE — CM/SW NOTE
DOMITILA spoke with Maggie from [facility].  Maggie has informed DOMITILA that Nakesa will be a compassionate wean once [place].  Most likely planned for tomorrow.    Andressa RAMIREZA BSN RN CRRN   RN Case Manager  [phone]

## 2024-06-30 NOTE — PROGRESS NOTES
[facility]  [address] [place] [address], [address]  [facility] [address]  [phone]          INPATIENT NEUROLOGY   FOLLOW UP CONSULT NOTE       [facility]  part of [facility]    Report of Consultation    Calos Hernandez Patient Status:  Inpatient     1980 MRN [id number]    Location [facility] 2W/SW Attending Bernardino Humphries MD    Hosp Day # 3 PCP No primary care provider on file.      Date of Admission:  2024  Date of Consult Follow Up:  2024        INTERVAL HPI:   -MRI and CT brain done   Severe hypernatremia  Significant other at bedside         ?PHYSICAL EXAM:   /83 (BP Location: Right arm)   Pulse 94   Temp 97.2 °F (36.2 °C) (Temporal)   Resp 12   Ht 67\"   Wt 118 lb 9.7 oz (53.8 kg)   LMP 2024   SpO2 98%   BMI 18.58 kg/m²   Pupils 3 mm and not reactive   Does not awaken       LABS/DATA:    Lab Results   Component Value Date    WBC 3.9 2024    HGB 9.6 2024    HCT 29.0 2024    .0 2024    CREATSERUM 0.81 2024    BUN 12 2024     2024    K 3.0 2024     2024    CO2 21.0 2024     2024    CA 8.4 2024    ALB 2.7 2024    ALKPHO 267 2024    BILT 0.6 2024    TP 6.7 2024    AST 71 2024    ALT 35 2024    MG 2.4 2024    PHOS 0.6 2024    PHOS 0.6 2024       HGBA1C:    Lab Results   Component Value Date    A1C 5.7 (H) 2024     2024              IMAGING:  XR CHEST AP PORTABLE  (CPT=71045)    Result Date: 2024  CONCLUSION:   Left approach PICC line terminates overlying the right atrium.  Recommend 5 cm of retraction.  No radiographic evidence of acute cardiopulmonary abnormality.    elm-remote    Dictated by (CST): Albert Sebastian MD on 2024 at 7:36 AM     Finalized by (CST): Albert Sebastian MD on 2024 at 7:38 AM          MRI BRAIN MRA BRAIN MRA NECK W+WO  (FUH=94322/31109/37250)    Result Date: 6/29/2024  CONCLUSION:  1.  MRI brain:  Reversal normal gray-white differentiation signal within diffusion-weighted in FLAIR images as well as other imaging sequences.  The appearance is compatible with presence of hypoxic ischemic injury. 2.  MRA brain:  There is slow flow demonstrated within the anterior and posterior circulation.  The anterior posterior circulation is otherwise patent based on non MRA brain imaging sequences specifically post-contrast MRA brain sequence series 40. Due to imaging technique assessment for aneurysm is limited. 3.  MRA neck:  Gross patency of the carotid and vertebral arteries without high-grade stenosis, occlusion, or dissection.    Dictated by (CST): Leland Whaley MD on 6/29/2024 at 7:26 PM     Finalized by (CST): Leland Whaley MD on 6/29/2024 at 7:33 PM          CT BRAIN OR HEAD ([id number])    Result Date: 6/29/2024  CONCLUSION:  1.  Diffuse loss of gray-white differentiation suggesting hypoxic/anoxic brain injury. 2.  Nonspecific high density surrounding the cerebellar hemispheres which may be due to evolving ischemic change or blood brain barrier breakdown.   Exam discussed with Dr. Uriostegui on 6/29/24 at 5:51 p.m.  Dictated by (CST): Leland Whaley MD on 6/29/2024 at 5:46 PM     Finalized by (CST): Leland Whaley MD on 6/29/2024 at 5:53 PM          CT BRAIN OR HEAD ([id number])    Result Date: 6/29/2024  CONCLUSION:  Subtle questioned hypodensity with questioned local mass effect in the posterior right greater than left parietal occipital regions.  Finding could be artifactual.  Recommend MRI brain for further assessment to exclude underlying infarct, press, or other  process.  Findings were discussed with nurse Hawley at 8:00 Eastern time on 06/29/2024 by Dr. Long   Hypodensity within the left basal ganglia, demonstrated to represent a chronic lacunar infarction on previous MRI.  Previously described signal changes within the right mesial temporal lobe,  concerning for infectious encephalitis versus seizure changes or other process not well appreciated by CT.  Secretions within the paranasal sinuses, new from prior.  Correlate with symptoms of sinusitis.      Dictated by (CST): Mery Michel MD on 6/29/2024 at 12:01 PM     Finalized by (CST): Mery Michel MD on 6/29/2024 at 12:11 PM          XR CHEST AP PORTABLE  (CPT=71045)    Result Date: 6/29/2024  CONCLUSION: Post right upper extremity PICC placement.  The tip extends into the contralateral left subclavian vein across the midline.  Recommend replacement of the line or retraction by approximately 13 cm.    Exam discussed with Dr. Humphries 6/29/24 at 11:40 a.m.  Dictated by (CST): Leland Whaley MD on 6/29/2024 at 11:35 AM     Finalized by (CST): Leland Whaley MD on 6/29/2024 at 11:40 AM          XR CHEST AP PORTABLE  (CPT=71045)    Result Date: 6/28/2024  CONCLUSION:  1. Normal heart size with prominent pulmonary vascularity and mild interstitial edema may suggest developing cardiac failure/fluid overload.  Development of moderate bibasilar alveolar airspace disease suggesting either bibasilar pneumonia, or pulmonary edema pattern.  Small left pleural effusion suggested.  ET tube in the trachea the level of the clavicles.  No pneumothorax.    Dictated by (CST): Fernie Gonzalez MD on 6/28/2024 at 4:31 PM     Finalized by (CST): Fernie Gonzalez MD on 6/28/2024 at 4:35 PM          XR CHEST AP PORTABLE  (CPT=71045)    Result Date: 6/27/2024  CONCLUSION:  1. Prominence to the bronchovascular markings may be related to a combination of semi upright technique, and possibly bronchitis.  No evidence of pneumonia.    Dictated by (CST): Rodriguez Stoner MD on 6/27/2024 at 1:27 PM     Finalized by (CST): Rodriguez Stoner MD on 6/27/2024 at 1:30 PM          MRI BRAIN (W+WO) (CPT=70553)    Result Date: 6/27/2024  CONCLUSION:  1. Cortical and subcortical T2/FLAIR hyperintense signal abnormality involving the right mesial temporal  lobe with corresponding nodular enhancement and equivocal associated diffusion restriction.  Primary differential considerations include sequelae of infectious encephalitis (such as HSV encephalitis), postictal phenomenon from recent seizure activity/status epilepticus, a nonspecific limbic/autoimmune encephalitis, or less likely neoplasm.  Lumbar puncture correlation is recommended.  In addition, post treatment follow-up imaging is recommended to assess evolution. 2. Chronic lacunar infarct in the left basal nuclei.  No other acute or subacute infarction. 3. Mild leptomeningeal/vascular hyperenhancement at the cerebellum could relate to vascular in cord or potentially meningitis. 4. Lesser incidental findings as above.   Results of this examination were discussed with the patient's physician, Dr. Howard, by Dr. Dahl at 1304 on 06/27/2024.  elm-remote  Dictated by (CST): Yomi Dahl MD on 6/27/2024 at 12:50 PM     Finalized by (CST): Yomi Dahl MD on 6/27/2024 at 1:05 PM          CTA BRAIN + CTV BRAIN (CPT=70496)    Result Date: 6/27/2024  CONCLUSION:  1. Negative CTA and CTV of brain.    Dictated by (CST): Rodriguez Stoner MD on 6/27/2024 at 9:13 AM     Finalized by (CST): Rodriguez Stoner MD on 6/27/2024 at 9:19 AM          CT BRAIN OR HEAD ([id number])    Result Date: 6/27/2024  CONCLUSION:  1. No acute intracranial finding. 2. Small probably chronic left basal ganglionic lacunar infarct related to small vessel disease. 3. Divergent gaze. 4. No major discrepancy with preliminary Vision radiology report.    Dictated by (CST): Rodriguez Stoner MD on 6/27/2024 at 6:30 AM     Finalized by (CST): Rodriguez Stoner MD on 6/27/2024 at 6:34 AM          I PERSONALLY REVIEWED THE updated CT brain, MRI and MRA brain/carotid    ASSESSMENT:  The patient is a 43 year old woman with past medical history of hypertension who presented with a 1 week history of progressively severe headaches vision changes with new seizure in our  emergency department and fevers.  Found to have infectious meningitis.  Developed fixed and dilated pupils with neuroimaging showing diffuse anoxic injury likely from cerebral edema related to meningitis despite Mannitol.    -CT brain, CTA and CTV were unremarkable  -EEG is normal  - MRI brain with and without T2/FLAIR hyperintensity in the right mesial temporal lobe with enhancement and possible diffusion restriction, cerebellar to meningeal/vascular hyperenhancement questionable vascular versus related to meningitis, chronic lacunar infarct in left basal ganglia  -CSF xanthochromic, 160 WBC lymphocyte predominant, 35 RBC, protein 321.9, glucose 36 which is low, pulmonary cultures negative, negative cryptococcal antigen  -, hemoglobin A1c is 5.7  - CT brain and MRI brain 6/29/2024 with diffuse loss of gray-white differentiate consistent with anoxic injury   -Ammonia is not elevated     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  All questions answered.     This note was prepared using Dragon Medical voice recognition dictation software and as a result, errors may occur. When identified, these errors have been corrected. While every attempt is made to correct errors during dictation, discrepancies may still exist    KAYDEN Uriostegui DO   Staff Neurologist   6/30/2024  10:42 AM

## 2024-06-30 NOTE — CONSULTS
NEPHROLOGY CONSULT NOTE       DATE: 6/29/2024    Requesting Physician: Dr. Humphries     Reason for Consult: cerebral edema, hypernatremia      HISTORY OF PRESENT ILLNESS: Calos Hernandez is a 43 year old female with no sig PMH. Presented with headaches. Currently admitted for sepsis, encephalopathy and possible meningoencephalitis complicated by seizure. Both ID and Neurology are following.  She is currently admitted to the ICU, intubated on levophed.  CT brain initially concerning for cerebral edema and was started on mannitol. Nephrology is consulted for for hypernatremia with Na increasing from 132 to 147 this afternoon. Patient was receiving maintenance IVFs - normal saline, but this has since been stopped.  Patient also polyuric with UOP of 4.5L.     MEDICAL HISTORY: History reviewed. No pertinent past medical history.    SURGICAL HISTORY: History reviewed. No pertinent surgical history.    FAMILY HISTORY: No family history on file.    SOCIAL HISTORY:   Social History     Socioeconomic History    Marital status: Single     Spouse name: Not on file    Number of children: Not on file    Years of education: Not on file    Highest education level: Not on file   Occupational History    Not on file   Tobacco Use    Smoking status: Not on file    Smokeless tobacco: Not on file   Substance and Sexual Activity    Alcohol use: Not on file    Drug use: Not on file    Sexual activity: Not on file   Other Topics Concern    Not on file   Social History Narrative    Not on file     Social Determinants of Health     Financial Resource Strain: Not on file   Food Insecurity: Unknown (6/29/2024)    Food Insecurity     Food Insecurity: Patient unable to answer   Transportation Needs: Unknown (6/29/2024)    Transportation Needs     Lack of Transportation: Patient unable to answer     Car Seat: Not on file   Physical Activity: Not on file   Stress: Not on file   Social Connections: Not on file   Housing Stability: Unknown (6/29/2024)     Housing Stability     Housing Instability: Patient unable to answer     Housing Instability Emergency: Not on file     Crib or Bassinette: Not on file       MEDICATIONS:   Current Facility-Administered Medications   Medication Dose Route Frequency    labetalol (Trandate) 5 mg/mL injection 10 mg  10 mg Intravenous Q6H PRN    norepinephrine (Levophed) 4 mg/250mL infusion premix  0.5-30 mcg/min Intravenous Continuous    metRONIDAZOLE in sodium chloride 0.79% (Flagyl) 5 mg/mL IVPB premix 500 mg  500 mg Intravenous q6h    dextrose 5% infusion   Intravenous Continuous    levETIRAcetam (Keppra) 500 mg/5mL injection 500 mg  500 mg Intravenous Q12H    aspirin 300 MG rectal suppository 300 mg  300 mg Rectal Daily    famotidine (Pepcid) 20 mg/2mL injection 20 mg  20 mg Intravenous BID    fentaNYL (Sublimaze) 50 mcg/mL injection 25 mcg  25 mcg Intravenous Q30 Min PRN    Or    fentaNYL (Sublimaze) 50 mcg/mL injection 50 mcg  50 mcg Intravenous Q30 Min PRN    polyethylene glycol (PEG 3350) (Miralax) 17 g oral packet 17 g  17 g Oral Daily PRN    senna (Senokot) 8.8 MG/5ML oral syrup 17.6 mg  10 mL Oral Nightly PRN    bisacodyl (Dulcolax) 10 MG rectal suppository 10 mg  10 mg Rectal Daily PRN    fleet enema (Fleet) 7-19 GM/118ML rectal enema 133 mL  1 enema Rectal Once PRN    chlorhexidine gluconate (Peridex) 0.12 % oral solution 15 mL  15 mL Mouth/Throat BID@0800,2000    propofol (Diprivan) 10 mg/mL infusion premix  5-50 mcg/kg/min (Dosing Weight) Intravenous Continuous    acetaminophen (Tylenol Extra Strength) tab 500 mg  500 mg Oral Q4H PRN    ondansetron (Zofran) 4 MG/2ML injection 4 mg  4 mg Intravenous Q6H PRN    prochlorperazine (Compazine) 10 MG/2ML injection 5 mg  5 mg Intravenous Q8H PRN    acyclovir (Zovirax) 550 mg in sodium chloride 0.9% 100 mL IVPB  10 mg/kg Intravenous Q8H    vancomycin (Vancocin) 1,000 mg in sodium chloride 0.9% 250 mL IVPB-ADDV  1,000 mg Intravenous Q12H    LORazepam (Ativan) 2 mg/mL injection 2  mg  2 mg Intravenous Q10 Min PRN    ceFEPIme (Maxpime) 2 g in sodium chloride 0.9% 100 mL IVPB-MBP  2 g Intravenous Q8H    heparin (Porcine) 5000 UNIT/ML injection 5,000 Units  5,000 Units Subcutaneous q12h    acetaminophen (Ofirmev) 10 mg/mL infusion premix 1,000 mg  1,000 mg Intravenous Q6H PRN    hydrALAzine (Apresoline) 20 mg/mL injection 10 mg  10 mg Intravenous Q6H PRN     No medications prior to admission.       ALLERGIES: Not on File    REVIEW OF SYSTEMS:  Unable to obtain as patient critically ill intubated and sedated     PHYSICAL EXAM:  /72 (BP Location: Right arm)   Pulse 91   Temp 96.7 °F (35.9 °C) (Temporal)   Resp 13   Ht 5' 7\" (1.702 m)   Wt 126 lb 12.2 oz (57.5 kg)   LMP 06/27/2024   SpO2 100%   BMI 19.85 kg/m²   GEN: intubated, sedated   HEENT:  ETT in place   CHEST: mechanical breath sounds   CARDIAC: S1S2 normal  ABD: soft, NT/ND  : joshua in place   EXT: no lower ext edema  NEURO: sedated     LABS:  Recent Labs   Lab 06/29/24  0542 06/29/24  1521 06/29/24  1630   * 128* 150*   BUN 18 14 16   CREATSERUM 0.71 0.53* 0.64   EGFRCR 107 116 111   CA 7.5* 6.9* 8.4*   * 149* 147*   K 3.6  3.6 3.5 4.0    119* 118*   CO2 19.0* 20.0* 22.0     Recent Labs   Lab 06/27/24  0717 06/28/24  0653 06/29/24  0542   RBC 4.54 4.52 3.55*   HGB 11.7* 11.8* 9.2*   HCT 36.1 35.0 28.0*   MCV 79.5* 77.4* 78.9*   MCH 25.8* 26.1 25.9*   MCHC 32.4 33.7 32.9   RDW 15.1* 14.7 15.2*   NEPRELIM 5.73 5.84 4.53   WBC 7.5 7.0 5.2   .0 280.0 280.0         INTAKE/OUTPUT:    Intake/Output Summary (Last 24 hours) at 6/29/2024 2121  Last data filed at 6/29/2024 1812  Gross per 24 hour   Intake 4518.1 ml   Output 4750 ml   Net -231.9 ml         IMAGING:  MRI BRAIN MRA BRAIN MRA NECK W+WO (CPT=70551/52766/95015)    Result Date: 6/29/2024  CONCLUSION:  1.  MRI brain:  Reversal normal gray-white differentiation signal within diffusion-weighted in FLAIR images as well as other imaging sequences.  The  appearance is compatible with presence of hypoxic ischemic injury. 2.  MRA brain:  There is slow flow demonstrated within the anterior and posterior circulation.  The anterior posterior circulation is otherwise patent based on non MRA brain imaging sequences specifically post-contrast MRA brain sequence series 40. Due to imaging technique assessment for aneurysm is limited. 3.  MRA neck:  Gross patency of the carotid and vertebral arteries without high-grade stenosis, occlusion, or dissection.    Dictated by (CST): Leland Whaley MD on 6/29/2024 at 7:26 PM     Finalized by (CST): Leland Whaley MD on 6/29/2024 at 7:33 PM          CT BRAIN OR HEAD (01230)    Result Date: 6/29/2024  CONCLUSION:  1.  Diffuse loss of gray-white differentiation suggesting hypoxic/anoxic brain injury. 2.  Nonspecific high density surrounding the cerebellar hemispheres which may be due to evolving ischemic change or blood brain barrier breakdown.   Exam discussed with Dr. Uriostegui on 6/29/24 at 5:51 p.m.  Dictated by (CST): Leland Whaley MD on 6/29/2024 at 5:46 PM     Finalized by (CST): Leland Whaley MD on 6/29/2024 at 5:53 PM          CT BRAIN OR HEAD ([id number])    Result Date: 6/29/2024  CONCLUSION:  Subtle questioned hypodensity with questioned local mass effect in the posterior right greater than left parietal occipital regions.  Finding could be artifactual.  Recommend MRI brain for further assessment to exclude underlying infarct, press, or other  process.  Findings were discussed with nurse Hawley at 8:00 Eastern time on 06/29/2024 by Dr. Long   Hypodensity within the left basal ganglia, demonstrated to represent a chronic lacunar infarction on previous MRI.  Previously described signal changes within the right mesial temporal lobe, concerning for infectious encephalitis versus seizure changes or other process not well appreciated by CT.  Secretions within the paranasal sinuses, new from prior.  Correlate with symptoms of sinusitis.       Dictated by (CST): Mery Michel MD on 6/29/2024 at 12:01 PM     Finalized by (CST): Mery Michel MD on 6/29/2024 at 12:11 PM          XR CHEST AP PORTABLE  (CPT=71045)    Result Date: 6/29/2024  CONCLUSION: Post right upper extremity PICC placement.  The tip extends into the contralateral left subclavian vein across the midline.  Recommend replacement of the line or retraction by approximately 13 cm.    Exam discussed with Dr. Humphries 6/29/24 at 11:40 a.m.  Dictated by (CST): Leland Whaley MD on 6/29/2024 at 11:35 AM     Finalized by (CST): Leland Whaley MD on 6/29/2024 at 11:40 AM          XR CHEST AP PORTABLE  (CPT=71045)    Result Date: 6/28/2024  CONCLUSION:  1. Normal heart size with prominent pulmonary vascularity and mild interstitial edema may suggest developing cardiac failure/fluid overload.  Development of moderate bibasilar alveolar airspace disease suggesting either bibasilar pneumonia, or pulmonary edema pattern.  Small left pleural effusion suggested.  ET tube in the trachea the level of the clavicles.  No pneumothorax.    Dictated by (CST): Fernie Gonzalez MD on 6/28/2024 at 4:31 PM     Finalized by (CST): Fernie Gonzalez MD on 6/28/2024 at 4:35 PM              ASSESSMENT AND PLAN:   This is 43 year old female with no sig PMH. Presented with headaches. Currently admitted for sepsis, encephalopathy and possible meningoencephalitis complicated by seizure.  Nephrology is consulted for hypernatremia.     Hypernatremia  - rapid increase from Na of 132 this AM to 147 this afternoon   - suspect rapid correction of Na due to polyuria. Also receiving NS   - Mannitol and NS stopped   -  IV sodium phos rider ended early to avoid worsening of Na level   - repeat Na 147   - discussed with Neurology - Dr. Uriostegui, as MRI brain neg for cerebral edema ok for D5W   - start D5W at 100/hr  - follow Na level, repeat BMP in 5 hours  - monitor urine output      Sepsis, encephalopathy  Possible meningoencephalitis  complicated by seizure  - per primary, ID and neuro    Acute respiratory failure s/p intubation   - per ICU     Dw Dr. Uriostegui and RN     Thank you for the consult and allowing us to participate in the care of Calos Hernandez.  We will continue to follow.    Margarita Corey MD  [facility]  6/29/2024

## 2024-06-30 NOTE — PROGRESS NOTES
NEPHROLOGY DAILY PROGRESS NOTE         SUBJECTIVE:  Intubated, sedated   Significant other at bedside       OBJECTIVE:    Total Intake/Output:    Intake/Output Summary (Last 24 hours) at 6/30/2024 1122  Last data filed at 6/30/2024 1050  Gross per 24 hour   Intake 5296.4 ml   Output 9750 ml   Net -4453.6 ml         PHYSICAL EXAM:  /83 (BP Location: Right arm)   Pulse 94   Temp 97.2 °F (36.2 °C) (Temporal)   Resp 12   Ht 5' 7\" (1.702 m)   Wt 118 lb 9.7 oz (53.8 kg)   LMP 06/27/2024   SpO2 98%   BMI 18.58 kg/m²   GEN: intubated, sedated   HEENT:  ETT in place   CHEST: mechanical breath sounds   CARDIAC: S1S2 normal  ABD: soft, NT/ND  : joshua in place   EXT: no lower ext edema  NEURO: sedated         CURRENT MEDICATIONS:  Current Facility-Administered Medications   Medication Dose Route Frequency    glucose (Dex4) 15 GM/59ML oral liquid 15 g  15 g Oral Q15 Min PRN    Or    glucose (Glutose) 40% oral gel 15 g  15 g Oral Q15 Min PRN    Or    glucose-vitamin C (Dex-4) chewable tab 4 tablet  4 tablet Oral Q15 Min PRN    Or    dextrose 50% injection 50 mL  50 mL Intravenous Q15 Min PRN    Or    glucose (Dex4) 15 GM/59ML oral liquid 30 g  30 g Oral Q15 Min PRN    Or    glucose (Glutose) 40% oral gel 30 g  30 g Oral Q15 Min PRN    Or    glucose-vitamin C (Dex-4) chewable tab 8 tablet  8 tablet Oral Q15 Min PRN    insulin aspart (NovoLOG) 100 Units/mL FlexPen 12 Units  12 Units Subcutaneous Once    potassium phosphate dibasic 30 mmol in sodium chloride 0.9% 250 mL IVPB  30 mmol Intravenous Once    potassium phosphate dibasic 15 mmol in sodium chloride 0.9% 250 mL IVPB  15 mmol Intravenous Once    insulin aspart (NovoLOG) 100 Units/mL FlexPen 1-11 Units  1-11 Units Subcutaneous Q6H    labetalol (Trandate) 5 mg/mL injection 10 mg  10 mg Intravenous Q6H PRN    norepinephrine (Levophed) 4 mg/250mL infusion premix  0.5-30 mcg/min Intravenous Continuous    metRONIDAZOLE in sodium chloride 0.79% (Flagyl) 5 mg/mL IVPB  premix 500 mg  500 mg Intravenous q6h    dextrose 5% infusion   Intravenous Continuous    levETIRAcetam (Keppra) 500 mg/5mL injection 500 mg  500 mg Intravenous Q12H    aspirin 300 MG rectal suppository 300 mg  300 mg Rectal Daily    famotidine (Pepcid) 20 mg/2mL injection 20 mg  20 mg Intravenous BID    fentaNYL (Sublimaze) 50 mcg/mL injection 25 mcg  25 mcg Intravenous Q30 Min PRN    Or    fentaNYL (Sublimaze) 50 mcg/mL injection 50 mcg  50 mcg Intravenous Q30 Min PRN    polyethylene glycol (PEG 3350) (Miralax) 17 g oral packet 17 g  17 g Oral Daily PRN    senna (Senokot) 8.8 MG/5ML oral syrup 17.6 mg  10 mL Oral Nightly PRN    bisacodyl (Dulcolax) 10 MG rectal suppository 10 mg  10 mg Rectal Daily PRN    fleet enema (Fleet) 7-19 GM/118ML rectal enema 133 mL  1 enema Rectal Once PRN    chlorhexidine gluconate (Peridex) 0.12 % oral solution 15 mL  15 mL Mouth/Throat BID@0800,2000    propofol (Diprivan) 10 mg/mL infusion premix  5-50 mcg/kg/min (Dosing Weight) Intravenous Continuous    acetaminophen (Tylenol Extra Strength) tab 500 mg  500 mg Oral Q4H PRN    ondansetron (Zofran) 4 MG/2ML injection 4 mg  4 mg Intravenous Q6H PRN    prochlorperazine (Compazine) 10 MG/2ML injection 5 mg  5 mg Intravenous Q8H PRN    acyclovir (Zovirax) 550 mg in sodium chloride 0.9% 100 mL IVPB  10 mg/kg Intravenous Q8H    vancomycin (Vancocin) 1,000 mg in sodium chloride 0.9% 250 mL IVPB-ADDV  1,000 mg Intravenous Q12H    LORazepam (Ativan) 2 mg/mL injection 2 mg  2 mg Intravenous Q10 Min PRN    ceFEPIme (Maxpime) 2 g in sodium chloride 0.9% 100 mL IVPB-MBP  2 g Intravenous Q8H    heparin (Porcine) 5000 UNIT/ML injection 5,000 Units  5,000 Units Subcutaneous q12h    acetaminophen (Ofirmev) 10 mg/mL infusion premix 1,000 mg  1,000 mg Intravenous Q6H PRN    hydrALAzine (Apresoline) 20 mg/mL injection 10 mg  10 mg Intravenous Q6H PRN           LABS:  Patient Labs Reviewed in Detail. Pertinent Labs as follows:  Recent Labs   Lab  06/29/24  1630 06/30/24  0340 06/30/24  0434   * 355* 456*   BUN 16 13 12   CREATSERUM 0.64 0.73 0.81   EGFRCR 111 103 91   CA 8.4* 8.4* 8.4*   * 154* 152*   K 4.0 3.1* 3.0*   * 124* 122*   CO2 22.0 21.0 21.0     Recent Labs   Lab 06/28/24  0653 06/29/24  0542 06/30/24  0434   RBC 4.52 3.55* 3.66*   HGB 11.8* 9.2* 9.6*   HCT 35.0 28.0* 29.0*   MCV 77.4* 78.9* 79.2*   MCH 26.1 25.9* 26.2   MCHC 33.7 32.9 33.1   RDW 14.7 15.2* 15.9*   NEPRELIM 5.84 4.53 3.26   WBC 7.0 5.2 3.9*   .0 280.0 264.0         IMAGING:  XR CHEST AP PORTABLE  (CPT=71045)    Result Date: 6/30/2024  CONCLUSION:   Left approach PICC line terminates overlying the right atrium.  Recommend 5 cm of retraction.  No radiographic evidence of acute cardiopulmonary abnormality.    elm-remote    Dictated by (CST): Albert Sebastian MD on 6/30/2024 at 7:36 AM     Finalized by (CST): Albert Sebastian MD on 6/30/2024 at 7:38 AM          MRI BRAIN MRA BRAIN MRA NECK W+WO (CPT=70551/81315/87020)    Result Date: 6/29/2024  CONCLUSION:  1.  MRI brain:  Reversal normal gray-white differentiation signal within diffusion-weighted in FLAIR images as well as other imaging sequences.  The appearance is compatible with presence of hypoxic ischemic injury. 2.  MRA brain:  There is slow flow demonstrated within the anterior and posterior circulation.  The anterior posterior circulation is otherwise patent based on non MRA brain imaging sequences specifically post-contrast MRA brain sequence series 40. Due to imaging technique assessment for aneurysm is limited. 3.  MRA neck:  Gross patency of the carotid and vertebral arteries without high-grade stenosis, occlusion, or dissection.    Dictated by (CST): Leland Whaley MD on 6/29/2024 at 7:26 PM     Finalized by (CST): Leland Whaley MD on 6/29/2024 at 7:33 PM          CT BRAIN OR HEAD ([id number])    Result Date: 6/29/2024  CONCLUSION:  1.  Diffuse loss of gray-white differentiation suggesting hypoxic/anoxic brain  injury. 2.  Nonspecific high density surrounding the cerebellar hemispheres which may be due to evolving ischemic change or blood brain barrier breakdown.   Exam discussed with Dr. Uriostegui on 6/29/24 at 5:51 p.m.  Dictated by (CST): Leland Whaley MD on 6/29/2024 at 5:46 PM     Finalized by (CST): Leland Whaley MD on 6/29/2024 at 5:53 PM          CT BRAIN OR HEAD ([id number])    Result Date: 6/29/2024  CONCLUSION:  Subtle questioned hypodensity with questioned local mass effect in the posterior right greater than left parietal occipital regions.  Finding could be artifactual.  Recommend MRI brain for further assessment to exclude underlying infarct, press, or other  process.  Findings were discussed with nurse Marleen at 8:00 Eastern time on 06/29/2024 by Dr. Long   Hypodensity within the left basal ganglia, demonstrated to represent a chronic lacunar infarction on previous MRI.  Previously described signal changes within the right mesial temporal lobe, concerning for infectious encephalitis versus seizure changes or other process not well appreciated by CT.  Secretions within the paranasal sinuses, new from prior.  Correlate with symptoms of sinusitis.      Dictated by (CST): Mery Michel MD on 6/29/2024 at 12:01 PM     Finalized by (CST): Mery Michel MD on 6/29/2024 at 12:11 PM          XR CHEST AP PORTABLE  (CPT=71045)    Result Date: 6/29/2024  CONCLUSION: Post right upper extremity PICC placement.  The tip extends into the contralateral left subclavian vein across the midline.  Recommend replacement of the line or retraction by approximately 13 cm.    Exam discussed with Dr. Humphries 6/29/24 at 11:40 a.m.  Dictated by (CST): Leland Whaley MD on 6/29/2024 at 11:35 AM     Finalized by (CST): Leland Whaley MD on 6/29/2024 at 11:40 AM          XR CHEST AP PORTABLE  (CPT=71045)    Result Date: 6/28/2024  CONCLUSION:  1. Normal heart size with prominent pulmonary vascularity and mild interstitial edema may suggest  developing cardiac failure/fluid overload.  Development of moderate bibasilar alveolar airspace disease suggesting either bibasilar pneumonia, or pulmonary edema pattern.  Small left pleural effusion suggested.  ET tube in the trachea the level of the clavicles.  No pneumothorax.    Dictated by (CST): Fernie Gonzalez MD on 6/28/2024 at 4:31 PM     Finalized by (CST): Fernie Gonzalez MD on 6/28/2024 at 4:35 PM            ASSESSMENT AND PLAN:   This is 43 year old female with no sig PMH. Presented with headaches. Currently admitted for sepsis, encephalopathy and possible meningoencephalitis complicated by seizure.  Nephrology is consulted for hypernatremia.      Hypernatremia  - worsening this AM  - suspect rapid correction of Na due to polyuria   - Mannitol and NS stopped   - D5W increased to 150 /hr.  Management of hyperglycemia per primary   - repeat Na level this afternoon   - monitor urine output      Hypokalemia   Hypophosphatemia   - receiving replacement   - follow K and phos level     Sepsis, encephalopathy  Possible meningoencephalitis complicated by seizure  - MRI brain with hypoxic ischemic injury   - per primary, ID and neuro     Acute respiratory failure s/p intubation   - per ICU      Dw RN     We will continue to follow Calos Corey MD  [facility]

## 2024-06-30 NOTE — PROGRESS NOTES
[facility]    Calos Hernandez Patient Status:  Inpatient    1980 MRN [id number]   Location [facility] 2W/SW Attending Bernardino Humphries MD   Hosp Day # 3 PCP No primary care provider on file.     Critical Care Progress Note     S: Remains unresponsive with fixed, dilated pupils.        OBJECTIVE:  Patient Vitals for the past 24 hrs:   BP Temp Temp src Pulse Resp SpO2 Weight   24 0600 107/66 -- -- 100 12 98 % --   24 0522 -- -- -- -- -- -- 118 lb 9.7 oz (53.8 kg)   24 0500 130/81 -- -- 99 12 99 % --   24 0400 104/69 98.4 °F (36.9 °C) Temporal 104 12 98 % --   24 0300 (!) 86/61 -- -- 98 12 98 % --   24 0100 107/63 -- -- 91 15 99 % --   24 0000 109/60 96.9 °F (36.1 °C) Temporal 87 12 100 % --   24 2300 112/66 -- -- 85 12 100 % --   24 2200 101/63 -- -- 85 12 100 % --   24 2100 114/67 -- -- 82 12 100 % --   24 2000 118/68 (!) 95.1 °F (35.1 °C) Temporal 82 12 100 % --   24 1920 -- -- -- 84 13 100 % --   24 1800 119/72 -- -- 91 13 100 % --   24 1700 121/77 -- -- 94 14 99 % --   24 1600 120/76 96.7 °F (35.9 °C) Temporal 87 14 99 % --   24 1525 -- -- -- 84 12 100 % --   24 1500 121/70 -- -- 89 21 100 % --   24 1400 124/72 -- -- 88 13 100 % --   24 1300 (!) 86/49 -- -- 88 19 100 % --   24 1200 116/77 98.6 °F (37 °C) Temporal 87 16 99 % --   24 1100 101/62 -- -- 81 12 100 % --   24 1020 97/56 -- -- 89 16 100 % --   24 1000 112/68 -- -- 93 12 100 % --   24 0920 124/80 -- -- 93 13 100 % --   24 0900 138/88 -- -- 98 14 100 % --   24 0820 137/85 -- -- 101 16 100 % --   24 0800 137/85 98.4 °F (36.9 °C) Temporal 103 22 100 % --        O2/Ventilator Settings: AC 12/500/40/5      Wt Readings from Last 3 Encounters:   24 118 lb 9.7 oz (53.8 kg)        I/O last 3 completed shifts:  In: 5591 [I.V.:4178.3; IV PIGGYBACK:1412.7]  Out: 7750 [Urine:7750]  No  intake/output data recorded.     Physical Exam:   General: unresponsive   Lungs: coarse bilaterally   Heart: Regular rate and rhythm, normal S1S2, no murmur.   Abdomen: soft, non-distended, positive BS.   Extremity: no edema   Skin: No rashes or lesions.       Lab Results   Component Value Date    WBC 3.9 06/30/2024    RBC 3.66 06/30/2024    HGB 9.6 06/30/2024    HCT 29.0 06/30/2024    MCV 79.2 06/30/2024    MCH 26.2 06/30/2024    MCHC 33.1 06/30/2024    RDW 15.9 06/30/2024    .0 06/30/2024     Lab Results   Component Value Date     06/30/2024    K 3.0 06/30/2024     06/30/2024    CO2 21.0 06/30/2024    BUN 12 06/30/2024    CREATSERUM 0.81 06/30/2024     06/30/2024    CA 8.4 06/30/2024    ALKPHO 267 06/30/2024    ALT 35 06/30/2024    AST 71 06/30/2024    BILT 0.6 06/30/2024    ALB 2.7 06/30/2024    TP 6.7 06/30/2024    MG 2.4 06/30/2024             Imaging: Reviewed     ASSESSMENT/PLAN:     Acute hypoxic respiratory failure:  secondary to altered mental status and inability to protect airway due to meningoencephalitis, possible neurogenic pulmonary edema  -cont current vent  -propofol per neuro  -wean O2 as above  -lasix prn     Neuro:  Meningoencephalitis with cerebral edema and anoxic brain injury  Per neuro  ID: Meningoencephalitis   Per ID  -cont abx, currently Vanc/acylovir/cefepime/flagyl   GI   Elevated ggt and alkphos.   Normal RUQ us. ?cholestatsis.   Hypernatremia:  secondary to polyuria, mannitol, NS  -per renal  Proph  Heparin subcutaneous  FEN  Tube feeding, start today if not comfort care  Dispo:  grim prognosis for meaningful recovery  Full code. . ICU monitoring.   Critical Care Time 75 min.    Miller Carballo MD  6/30/2024  7:11 AM

## 2024-06-30 NOTE — HOSPICE RN NOTE
Hospice referral received. Gumaro hussein. [facility] will follow up with family to set up informational meeting following compassionate wean in the next few days as family is awaiting arrival of patient father from [place] possibly tomorrow. [facility] will follow patient and await compassionate wean and [employer] donation.     Maggie Ramirez RN, BSN  Transitional Nurse Liaison  [facility]  [phone] (After-hours)

## 2024-06-30 NOTE — PLAN OF CARE
Pt. Remains in comatose state, hypotensive early this morning, had to increase levo, then decrease later. Pt. Started on d5w per nephrology. Blood sugar 456, sodium 152 phos elevated and potassium low.  Electrolyte protocol ordered, nephro adjusted D5W. Primary messaged for insulin protocol. Boyfriend at the bedside. Will continue to follow the plan of care.   Problem: Safety Risk - Non-Violent Restraints  Goal: Patient will remain free from self-harm  Description: INTERVENTIONS:  - Apply the least restrictive restraint to prevent harm  - Notify patient and family of reasons restraints applied  - Assess for any contributing factors to confusion (electrolyte disturbances, delirium, medications)  - Discontinue any unnecessary medical devices as soon as possible  - Assess the patient's physical comfort, circulation, skin condition, hydration, nutrition and elimination needs   - Reorient and redirection as needed  - Assess for the need to continue restraints  6/30/2024 0540 by Marleen Delgado, RN  Outcome: Progressing  6/30/2024 0539 by Marleen Delgado, RN  Outcome: Progressing     Problem: Patient Centered Care  Goal: Patient preferences are identified and integrated in the patient's plan of care  Description: Interventions:  - What would you like us to know as we care for you?   - Provide timely, complete, and accurate information to patient/family  - Incorporate patient and family knowledge, values, beliefs, and cultural backgrounds into the planning and delivery of care  - Encourage patient/family to participate in care and decision-making at the level they choose  - Honor patient and family perspectives and choices  Outcome: Progressing     Problem: PAIN - ADULT  Goal: Verbalizes/displays adequate comfort level or patient's stated pain goal  Description: INTERVENTIONS:  - Encourage pt to monitor pain and request assistance  - Assess pain using appropriate pain scale  - Administer analgesics based on type and severity of  pain and evaluate response  - Implement non-pharmacological measures as appropriate and evaluate response  - Consider cultural and social influences on pain and pain management  - Manage/alleviate anxiety  - Utilize distraction and/or relaxation techniques  - Monitor for opioid side effects  - Notify MD/LIP if interventions unsuccessful or patient reports new pain  - Anticipate increased pain with activity and pre-medicate as appropriate  Outcome: Progressing     Problem: SAFETY ADULT - FALL  Goal: Free from fall injury  Description: INTERVENTIONS:  - Assess pt frequently for physical needs  - Identify cognitive and physical deficits and behaviors that affect risk of falls.  - Bumpass fall precautions as indicated by assessment.  - Educate pt/family on patient safety including physical limitations  - Instruct pt to call for assistance with activity based on assessment  - Modify environment to reduce risk of injury  - Provide assistive devices as appropriate  - Consider OT/PT consult to assist with strengthening/mobility  - Encourage toileting schedule  Outcome: Progressing     Problem: DISCHARGE PLANNING  Goal: Discharge to home or other facility with appropriate resources  Description: INTERVENTIONS:  - Identify barriers to discharge w/pt and caregiver  - Include patient/family/discharge partner in discharge planning  - Arrange for needed discharge resources and transportation as appropriate  - Identify discharge learning needs (meds, wound care, etc)  - Arrange for interpreters to assist at discharge as needed  - Consider post-discharge preferences of patient/family/discharge partner  - Complete POLST form as appropriate  - Assess patient's ability to be responsible for managing their own health  - Refer to Case Management Department for coordinating discharge planning if the patient needs post-hospital services based on physician/LIP order or complex needs related to functional status, cognitive ability or  social support system  Outcome: Progressing     Problem: CARDIOVASCULAR - ADULT  Goal: Maintains optimal cardiac output and hemodynamic stability  Description: INTERVENTIONS:  - Monitor vital signs, rhythm, and trends  - Monitor for bleeding, hypotension and signs of decreased cardiac output  - Evaluate effectiveness of vasoactive medications to optimize hemodynamic stability  - Monitor arterial and/or venous puncture sites for bleeding and/or hematoma  - Assess quality of pulses, skin color and temperature  - Assess for signs of decreased coronary artery perfusion - ex. Angina  - Evaluate fluid balance, assess for edema, trend weights  Outcome: Progressing     Problem: NEUROLOGICAL - ADULT  Goal: Achieves stable or improved neurological status  Description: INTERVENTIONS  - Assess for and report changes in neurological status  - Initiate measures to prevent increased intracranial pressure  - Maintain blood pressure and fluid volume within ordered parameters to optimize cerebral perfusion and minimize risk of hemorrhage  - Monitor temperature, glucose, and sodium. Initiate appropriate interventions as ordered  Outcome: Progressing  Goal: Absence of seizures  Description: INTERVENTIONS  - Monitor for seizure activity  - Administer anti-seizure medications as ordered  - Monitor neurological status  Outcome: Progressing  Goal: Remains free of injury related to seizure activity  Description: INTERVENTIONS:  - Maintain airway, patient safety  and administer oxygen as ordered  - Monitor patient for seizure activity, document and report duration and description of seizure to MD/LIP  - If seizure occurs, turn patient to side and suction secretions as needed  - Reorient patient post seizure  - Seizure pads on all 4 side rails  - Instruct patient/family to notify RN of any seizure activity  - Instruct patient/family to call for assistance with activity based on assessment  Outcome: Progressing     Problem: RESPIRATORY -  ADULT  Goal: Achieves optimal ventilation and oxygenation  Description: INTERVENTIONS:  - Assess for changes in respiratory status  - Assess for changes in mentation and behavior  - Position to facilitate oxygenation and minimize respiratory effort  - Oxygen supplementation based on oxygen saturation or ABGs  - Provide Smoking Cessation handout, if applicable  - Encourage broncho-pulmonary hygiene including cough, deep breathe, Incentive Spirometry  - Assess the need for suctioning and perform as needed  - Assess and instruct to report SOB or any respiratory difficulty  - Respiratory Therapy support as indicated  - Manage/alleviate anxiety  - Monitor for signs/symptoms of CO2 retention  Outcome: Progressing     Problem: GENITOURINARY - ADULT  Goal: Absence of urinary retention  Description: INTERVENTIONS:  - Assess patient’s ability to void and empty bladder  - Monitor intake/output and perform bladder scan as needed  - Follow urinary retention protocol/standard of care  - Consider collaborating with pharmacy to review patient's medication profile  - Implement strategies to promote bladder emptying  Outcome: Progressing

## 2024-06-30 NOTE — PROGRESS NOTES
[facility]  part of [facility] Infectious Disease Consult    Calos Hernandez Patient Status:  Inpatient    1980 MRN [id number]   Location [facility] 2W/SW Attending Alejandro Bob,    Hosp Day # 3 PCP No primary care provider on file.       Calos Hernandez seen and examined,   Afebrile,  Intubated now,   Not interactive,   Family at the bed side,     History:  History reviewed. No pertinent past medical history.  History reviewed. No pertinent surgical history.  No family history on file.       Allergies:  Not on File    Medications:    Current Facility-Administered Medications:     glucose (Dex4) 15 GM/59ML oral liquid 15 g, 15 g, Oral, Q15 Min PRN **OR** glucose (Glutose) 40% oral gel 15 g, 15 g, Oral, Q15 Min PRN **OR** glucose-vitamin C (Dex-4) chewable tab 4 tablet, 4 tablet, Oral, Q15 Min PRN **OR** dextrose 50% injection 50 mL, 50 mL, Intravenous, Q15 Min PRN **OR** glucose (Dex4) 15 GM/59ML oral liquid 30 g, 30 g, Oral, Q15 Min PRN **OR** glucose (Glutose) 40% oral gel 30 g, 30 g, Oral, Q15 Min PRN **OR** glucose-vitamin C (Dex-4) chewable tab 8 tablet, 8 tablet, Oral, Q15 Min PRN    insulin aspart (NovoLOG) 100 Units/mL FlexPen 12 Units, 12 Units, Subcutaneous, Once    potassium phosphate dibasic 15 mmol in sodium chloride 0.9% 250 mL IVPB, 15 mmol, Intravenous, Once    insulin aspart (NovoLOG) 100 Units/mL FlexPen 1-11 Units, 1-11 Units, Subcutaneous, Q6H    labetalol (Trandate) 5 mg/mL injection 10 mg, 10 mg, Intravenous, Q6H PRN    norepinephrine (Levophed) 4 mg/250mL infusion premix, 0.5-30 mcg/min, Intravenous, Continuous    metRONIDAZOLE in sodium chloride 0.79% (Flagyl) 5 mg/mL IVPB premix 500 mg, 500 mg, Intravenous, q6h    dextrose 5% infusion, , Intravenous, Continuous    levETIRAcetam (Keppra) 500 mg/5mL injection 500 mg, 500 mg, Intravenous, Q12H    aspirin 300 MG rectal suppository 300 mg, 300 mg, Rectal, Daily    famotidine (Pepcid) 20 mg/2mL  injection 20 mg, 20 mg, Intravenous, BID    fentaNYL (Sublimaze) 50 mcg/mL injection 25 mcg, 25 mcg, Intravenous, Q30 Min PRN **OR** fentaNYL (Sublimaze) 50 mcg/mL injection 50 mcg, 50 mcg, Intravenous, Q30 Min PRN    polyethylene glycol (PEG 3350) (Miralax) 17 g oral packet 17 g, 17 g, Oral, Daily PRN    senna (Senokot) 8.8 MG/5ML oral syrup 17.6 mg, 10 mL, Oral, Nightly PRN    bisacodyl (Dulcolax) 10 MG rectal suppository 10 mg, 10 mg, Rectal, Daily PRN    fleet enema (Fleet) 7-19 GM/118ML rectal enema 133 mL, 1 enema, Rectal, Once PRN    chlorhexidine gluconate (Peridex) 0.12 % oral solution 15 mL, 15 mL, Mouth/Throat, BID@0800,2000    propofol (Diprivan) 10 mg/mL infusion premix, 5-50 mcg/kg/min (Dosing Weight), Intravenous, Continuous    acetaminophen (Tylenol Extra Strength) tab 500 mg, 500 mg, Oral, Q4H PRN    ondansetron (Zofran) 4 MG/2ML injection 4 mg, 4 mg, Intravenous, Q6H PRN    prochlorperazine (Compazine) 10 MG/2ML injection 5 mg, 5 mg, Intravenous, Q8H PRN    acyclovir (Zovirax) 550 mg in sodium chloride 0.9% 100 mL IVPB, 10 mg/kg, Intravenous, Q8H    vancomycin (Vancocin) 1,000 mg in sodium chloride 0.9% 250 mL IVPB-ADDV, 1,000 mg, Intravenous, Q12H    LORazepam (Ativan) 2 mg/mL injection 2 mg, 2 mg, Intravenous, Q10 Min PRN    ceFEPIme (Maxpime) 2 g in sodium chloride 0.9% 100 mL IVPB-MBP, 2 g, Intravenous, Q8H    heparin (Porcine) 5000 UNIT/ML injection 5,000 Units, 5,000 Units, Subcutaneous, q12h    acetaminophen (Ofirmev) 10 mg/mL infusion premix 1,000 mg, 1,000 mg, Intravenous, Q6H PRN    hydrALAzine (Apresoline) 20 mg/mL injection 10 mg, 10 mg, Intravenous, Q6H PRN    Review of Systems:   Constitutional: Negative for anorexia, chills, fatigue, fevers, malaise, night sweats and weight loss.  Eyes: Negative for visual disturbance, irritation and redness.  Ears, nose, mouth, throat, and face: Negative for hearing loss, tinnitus, nasal congestion, snoring, sore throat, hoarseness and voice  change.  Respiratory: Negative for cough, sputum, hemoptysis, chest pain, wheezing, dyspnea on exertion, or stridor.  Cardiovascular: Negative for chest pain, palpitations, irregular heart beats, syncope, fatigue, orthopnea, paroxysmal nocturnal dyspnea, lower extremity edema.  Gastrointestinal: Negative for dysphagia, odynophagia, reflux symptoms, nausea, vomiting, change in bowel habits, diarrhea, constipation and abdominal pain.  Integument/breast: Negative for rash, skin lesions, and pruritus.  Hematologic/lymphatic: Negative for easy bruising, bleeding, and lymphadenopathy.  Musculoskeletal: Negative for myalgias, arthralgias, muscle weakness.  Neurological: Negative for headaches, dizziness, seizures, memory problems, trouble swallowing, speech problems, gait problems and weakness.  Behavioral/Psych: Negative for active tobacco use.  Endocrine: No history of of diabetes, thyroid disorder.  Unable to obtain,     Vital signs in last 24 hours:  Patient Vitals for the past 24 hrs:   BP Temp Temp src Pulse Resp SpO2 Height Weight   06/27/24 1000 (!) 164/103 -- -- (!) 121 (!) 31 100 % -- --   06/27/24 0900 (!) 151/122 -- -- 102 (!) 46 98 % -- --   06/27/24 0800 157/83 99.4 °F (37.4 °C) Temporal 100 25 97 % -- --   06/27/24 0704 -- -- -- -- -- -- 5' 7\" (1.702 m) 121 lb 4.1 oz (55 kg)   06/27/24 0700 (!) 163/88 -- -- 113 22 98 % -- --   06/27/24 0646 -- (!) 101.1 °F (38.4 °C) Temporal -- -- -- -- --   06/27/24 0600 (!) 168/87 -- -- 94 25 97 % -- --   06/27/24 0529 -- (!) 102.5 °F (39.2 °C) Rectal -- -- -- -- --   06/27/24 0500 (!) 155/97 -- -- (!) 124 (!) 28 97 % -- --   06/27/24 0445 (!) 157/95 -- -- 118 (!) 35 100 % -- --   06/27/24 0400 -- -- -- 81 (!) 35 100 % -- --   06/27/24 0230 (!) 165/97 -- -- 91 (!) 36 100 % -- --   06/27/24 0145 (!) 189/98 -- -- 115 25 96 % -- --   06/27/24 0122 -- 98.8 °F (37.1 °C) Temporal -- -- -- -- --   06/27/24 0042 -- -- -- -- -- -- -- 92 lb (41.7 kg)   06/27/24 0041 (!) 176/78 98.2  °F (36.8 °C) Temporal 99 18 99 % -- --       Physical Exam:   General: alert, cooperative, oriented.  No respiratory distress.   Head: Normocephalic, without obvious abnormality, atraumatic.   Eyes: Conjunctivae/corneas clear.  No scleral icterus.  No conjunctival     hemorrhage.   Nose: Nares normal.   Throat: Lips, mucosa, and tongue normal.  No thrush noted.   Neck: Soft, supple neck; trachea midline, no adenopathy, no thyromegaly.   Lungs: CTAB, normal and equal bilateral chest rise   Chest wall: No tenderness or deformity.   Heart: Regular rate and rhythm, normal S1S2, no murmur.   Abdomen: soft, non-tender, non-distended, no masses, no guarding, no     rebound, positive BS.   Extremity: no edema, no cyanosis   Skin: No rashes or lesions.   Neurological: Alert, interactive, no focal deficits    Labs:  Lab Results   Component Value Date    WBC 3.9 06/30/2024    HGB 9.6 06/30/2024    HCT 29.0 06/30/2024    .0 06/30/2024    CREATSERUM 0.70 06/30/2024    BUN 9 06/30/2024     06/30/2024    K 3.6 06/30/2024     06/30/2024    CO2 21.0 06/30/2024     06/30/2024    CA 8.3 06/30/2024    ALB 2.7 06/30/2024    ALKPHO 267 06/30/2024    BILT 0.6 06/30/2024    TP 6.7 06/30/2024    AST 71 06/30/2024    ALT 35 06/30/2024    MG 2.4 06/30/2024    PHOS 0.6 06/30/2024    PHOS 0.6 06/30/2024       Radiology:  Reviewed,       Cultures:  Reviewed,     Assessment and Plan:    1.  Syndrome of Fever, headache and AMS:   - Complained of toothache at home, headaches for few days, had a Sz in the ER waiting room,   - On supplemental O2 and being monitored for airway protection,   - Differential includes meningoencephalitis with possible aspiration,   - CSF with , glucose 36 and Protein 321, follow Cul, MEP negative for HSV I & II.   - MRI of head reviewed, hyperintense abn in temporal lobe,  - Blood cul are NGTD  - RVP is negative,   - Strep Pneumo antigen in urine is negative,  - Crypto Ag is negative - has  a bird at home,    - Continue IV Vanc and Acyclcovir, IV Cefepime, Flagyl, d # 3,     2.   Acute hypoxic resp failure: Intubated for airway protection,   - Thierry purulent secretions during intubation, Trach aspirate Cul negative,   - RVP negative,     3.    Disposition: inhouse, a middle aged female admitted with headache and AMS with fever and a new Sz, Possible Meningoencephalitis, although HSV PCR negative in CSF, enhancement of temporal lobe, will continue acyclovir, Overall critically sick, discussed with family, they are concerned about some line which was put in late, ? NG, discussed with RN, too,   - Follow pending cul,   - Continue IV Vanc and Acyclovir, IV Cefepime with Flagyl,   - Supportive care,     Discussed with family,  all questions answered, further recommendations to follow, Thanks,  Thank you for consulting [facility] for Calos Hernandez.  If you have any questions or concerns please call [facility] and [facility] Infectious Disease at [phone].     Michael Camejo MD  6/27/2024  12:52 PM

## 2024-06-30 NOTE — PROGRESS NOTES
Neurology plan of care  MRI brain confirms diffuse anoxic injury and poor flow through intracranial vessels.  Suspect related to meningitis causing vasogenic cerebral edema.  Mannitol was ineffective. Patient presented too late in course for steroids to be effective or safe.    Grim prognosis for meaningful neurological recovery. Recommend hospice referral.  Communicated to primary team and RN.    ADDI Uriostegui DO

## 2024-06-30 NOTE — PROGRESS NOTES
[facility] Hospitalist Progress Note     CC: Hospital Follow up    PCP: No primary care provider on file.       Assessment/Plan:   Patient is a 43-year-old female with no known medical history, who presents to the hospital for evaluation of headaches and feeling ill.  Being encephalopathic with meningitis/encephalitis as the likely cause.  6/28/2024 patient was intubated for airway protection. 6/29/24 with fixed dilated pupils, no gag reflex.  Serial CTH and MRI with diffuse anoxic brain injury.  Ongoing goals of care discussions as family is arriving into [facility].      Sepsis  Encephalopathy  Meningitis/Encephalitis  -source unclear, poor dentition noted, tooth pain this week? -headaches prior to admission. Aspiration?  -MRI brain with possible encephalitis changes, possible HSV?  -on cefepime, vancomycin, acyclovir, flagyl  -IVIG initiated, per neuro  -LP done on 6/27 appreciate neurology eval   -continue rate of fluids (she is also on acyclovir, for renal protection).   -ID, and neuro consult  -6/29/24 with fixed dilated pupils, CTH with brain edema   -mannitol per neuro    -Keppra and propofol for seizure pxx   -Serial CTH and MRI with diffuse anoxic brain injury  -awaiting family to fly into [facility]      Acute respiratory failure  -intubated 6/28/24  -CXR, ABG reviewed   -GI pxx with Pepcid   -critical care following       Seizure  -now on keppra  -EEG normal   -neuro consult   -propofol without sedation holiday for now      Tachycardia  -sinus tachycardia, secondary to sepsis      Elevated liver enzymes  -alk phos high  -bili high  -borderline ast/alt  -ammonia normal   -trend CMP   -RUQ ultrasound noted      Hyponatremia with rapid increase to hypernatremia   -monitor sodium closely   -mannitol stopped   -nephrology consulted   -0.9 stopped   -D5 ongoing      Fluids: D5  Diet: NPO, TF today   DVT prophylaxis: heparin sub q  Code status: FULL      Critical care time 35 minutes     Bernardino Collado  Health and Care Hospitalist        Subjective:     Admitted and sedated.  Boyfriend at bedside.     OBJECTIVE:    Blood pressure 128/83, pulse 94, temperature 97.2 °F (36.2 °C), temperature source Temporal, resp. rate 12, height 5' 7\" (1.702 m), weight 118 lb 9.7 oz (53.8 kg), last menstrual period 06/27/2024, SpO2 98%.    Temp:  [95.1 °F (35.1 °C)-98.6 °F (37 °C)] 97.2 °F (36.2 °C)  Pulse:  [] 94  Resp:  [12-21] 12  BP: ()/(49-83) 128/83  SpO2:  [97 %-100 %] 98 %  FiO2 (%):  [30 %-50 %] 30 %      Intake/Output:    Intake/Output Summary (Last 24 hours) at 6/30/2024 1156  Last data filed at 6/30/2024 1050  Gross per 24 hour   Intake 5296.4 ml   Output 9750 ml   Net -4453.6 ml       Last 3 Weights   06/30/24 0522 118 lb 9.7 oz (53.8 kg)   06/29/24 0357 126 lb 12.2 oz (57.5 kg)   06/27/24 0704 121 lb 4.1 oz (55 kg)   06/27/24 0042 92 lb (41.7 kg)       /83 (BP Location: Right arm)   Pulse 94   Temp 97.2 °F (36.2 °C) (Temporal)   Resp 12   Ht 5' 7\" (1.702 m)   Wt 118 lb 9.7 oz (53.8 kg)   LMP 06/27/2024   SpO2 98%   BMI 18.58 kg/m²   General: Alert, no acute distress  HEENT: oral mucosa dry blood, poor dentition, ET tube in place  Neck: non tender, no adenopathy   Lungs: Mechanical breath sounds  Heart: Tachycardic  Abdomen: soft, non tender, non distended   Extremities: No edema  Skin: no new rash, normal color  Neuro: Fixed dilated pupils, no gag reflex     Data Review:       Labs:     Recent Labs   Lab 06/28/24  0653 06/29/24  0542 06/30/24  0434   RBC 4.52 3.55* 3.66*   HGB 11.8* 9.2* 9.6*   HCT 35.0 28.0* 29.0*   MCV 77.4* 78.9* 79.2*   MCH 26.1 25.9* 26.2   MCHC 33.7 32.9 33.1   RDW 14.7 15.2* 15.9*   NEPRELIM 5.84 4.53 3.26   WBC 7.0 5.2 3.9*   .0 280.0 264.0         Recent Labs   Lab 06/29/24  1630 06/30/24  0340 06/30/24  0434   * 355* 456*   BUN 16 13 12   CREATSERUM 0.64 0.73 0.81   EGFRCR 111 103 91   CA 8.4* 8.4* 8.4*   * 154* 152*   K 4.0 3.1* 3.0*   *  124* 122*   CO2 22.0 21.0 21.0       Recent Labs   Lab 06/27/24  0123 06/28/24  0653 06/29/24  0542 06/30/24  0434   ALT 60*  56* 48 35 35   AST 77*  76* 78* 64* 71*   ALB 4.4  4.2 3.3 2.5* 2.7*         Imaging:  XR CHEST AP PORTABLE  (CPT=71045)    Result Date: 6/30/2024  CONCLUSION:   Left approach PICC line terminates overlying the right atrium.  Recommend 5 cm of retraction.  No radiographic evidence of acute cardiopulmonary abnormality.    elm-remote    Dictated by (CST): Albert Sebastian MD on 6/30/2024 at 7:36 AM     Finalized by (CST): Albert Sebastian MD on 6/30/2024 at 7:38 AM          MRI BRAIN MRA BRAIN MRA NECK W+WO (CPT=70551/16462/84575)    Result Date: 6/29/2024  CONCLUSION:  1.  MRI brain:  Reversal normal gray-white differentiation signal within diffusion-weighted in FLAIR images as well as other imaging sequences.  The appearance is compatible with presence of hypoxic ischemic injury. 2.  MRA brain:  There is slow flow demonstrated within the anterior and posterior circulation.  The anterior posterior circulation is otherwise patent based on non MRA brain imaging sequences specifically post-contrast MRA brain sequence series 40. Due to imaging technique assessment for aneurysm is limited. 3.  MRA neck:  Gross patency of the carotid and vertebral arteries without high-grade stenosis, occlusion, or dissection.    Dictated by (CST): Leland Whaley MD on 6/29/2024 at 7:26 PM     Finalized by (CST): Leland Whaley MD on 6/29/2024 at 7:33 PM          CT BRAIN OR HEAD ([id number])    Result Date: 6/29/2024  CONCLUSION:  1.  Diffuse loss of gray-white differentiation suggesting hypoxic/anoxic brain injury. 2.  Nonspecific high density surrounding the cerebellar hemispheres which may be due to evolving ischemic change or blood brain barrier breakdown.   Exam discussed with Dr. Uriostegui on 6/29/24 at 5:51 p.m.  Dictated by (CST): Leland Whaley MD on 6/29/2024 at 5:46 PM     Finalized by (CST): Leland Whaley MD on 6/29/2024  at 5:53 PM          CT BRAIN OR HEAD ([id number])    Result Date: 6/29/2024  CONCLUSION:  Subtle questioned hypodensity with questioned local mass effect in the posterior right greater than left parietal occipital regions.  Finding could be artifactual.  Recommend MRI brain for further assessment to exclude underlying infarct, press, or other  process.  Findings were discussed with nurse Hawley at 8:00 Eastern time on 06/29/2024 by Dr. Long   Hypodensity within the left basal ganglia, demonstrated to represent a chronic lacunar infarction on previous MRI.  Previously described signal changes within the right mesial temporal lobe, concerning for infectious encephalitis versus seizure changes or other process not well appreciated by CT.  Secretions within the paranasal sinuses, new from prior.  Correlate with symptoms of sinusitis.      Dictated by (CST): Mery Michel MD on 6/29/2024 at 12:01 PM     Finalized by (CST): Mery Michel MD on 6/29/2024 at 12:11 PM          XR CHEST AP PORTABLE  (CPT=71045)    Result Date: 6/29/2024  CONCLUSION: Post right upper extremity PICC placement.  The tip extends into the contralateral left subclavian vein across the midline.  Recommend replacement of the line or retraction by approximately 13 cm.    Exam discussed with Dr. Humphries 6/29/24 at 11:40 a.m.  Dictated by (CST): Leland Whaley MD on 6/29/2024 at 11:35 AM     Finalized by (CST): Leland Whaley MD on 6/29/2024 at 11:40 AM          XR CHEST AP PORTABLE  (CPT=71045)    Result Date: 6/28/2024  CONCLUSION:  1. Normal heart size with prominent pulmonary vascularity and mild interstitial edema may suggest developing cardiac failure/fluid overload.  Development of moderate bibasilar alveolar airspace disease suggesting either bibasilar pneumonia, or pulmonary edema pattern.  Small left pleural effusion suggested.  ET tube in the trachea the level of the clavicles.  No pneumothorax.    Dictated by (CST): Fernie Gonzalez MD on  6/28/2024 at 4:31 PM     Finalized by (CST): Fernie Gonzalez MD on 6/28/2024 at 4:35 PM          MRI BRAIN (W+WO) (CPT=70553)    Result Date: 6/27/2024  CONCLUSION:  1. Cortical and subcortical T2/FLAIR hyperintense signal abnormality involving the right mesial temporal lobe with corresponding nodular enhancement and equivocal associated diffusion restriction.  Primary differential considerations include sequelae of infectious encephalitis (such as HSV encephalitis), postictal phenomenon from recent seizure activity/status epilepticus, a nonspecific limbic/autoimmune encephalitis, or less likely neoplasm.  Lumbar puncture correlation is recommended.  In addition, post treatment follow-up imaging is recommended to assess evolution. 2. Chronic lacunar infarct in the left basal nuclei.  No other acute or subacute infarction. 3. Mild leptomeningeal/vascular hyperenhancement at the cerebellum could relate to vascular in cord or potentially meningitis. 4. Lesser incidental findings as above.   Results of this examination were discussed with the patient's physician, Dr. Howard, by Dr. Dahl at 1304 on 06/27/2024.  elm-remote  Dictated by (CST): Yomi Dahl MD on 6/27/2024 at 12:50 PM     Finalized by (CST): Yomi Dahl MD on 6/27/2024 at 1:05 PM             Meds:      insulin aspart  12 Units Subcutaneous Once    potassium phosphate dibasic 30 mmol in sodium chloride 0.9% 250 mL IVPB  30 mmol Intravenous Once    potassium phosphate dibasic 15 mmol in sodium chloride 0.9% 250 mL IVPB  15 mmol Intravenous Once    insulin aspart  1-11 Units Subcutaneous Q6H    metRONIDAZOLE  500 mg Intravenous q6h    levETIRAcetam  500 mg Intravenous Q12H    aspirin  300 mg Rectal Daily    famotidine  20 mg Intravenous BID    chlorhexidine gluconate  15 mL Mouth/Throat BID@0800,2000    acyclovir  10 mg/kg Intravenous Q8H    vancomycin  1,000 mg Intravenous Q12H    cefepime  2 g Intravenous Q8H    heparin  5,000 Units Subcutaneous q12h       norepinephrine 10 mcg/min (06/30/24 0836)    dextrose 150 mL/hr at 06/30/24 0836    propofol 20 mcg/kg/min (06/30/24 0253)       glucose **OR** glucose **OR** glucose-vitamin C **OR** dextrose **OR** glucose **OR** glucose **OR** glucose-vitamin C    labetalol    fentaNYL **OR** fentaNYL    polyethylene glycol (PEG 3350)    senna    bisacodyl    fleet enema    acetaminophen    ondansetron    prochlorperazine    LORazepam    acetaminophen    hydrALAzine

## 2024-06-30 NOTE — PLAN OF CARE
IV fluids being managed by nephrology. [place] in place, output as charted. Dr. Humphries updated family this evening on prognosis and next steps. Plan to wait for father's arrival from [place] before proceeding with next steps.     Problem: Patient Centered Care  Goal: Patient preferences are identified and integrated in the patient's plan of care  Description: Interventions:  - What would you like us to know as we care for you?   - Provide timely, complete, and accurate information to patient/family  - Incorporate patient and family knowledge, values, beliefs, and cultural backgrounds into the planning and delivery of care  - Encourage patient/family to participate in care and decision-making at the level they choose  - Honor patient and family perspectives and choices  Outcome: Not Progressing     Problem: PAIN - ADULT  Goal: Verbalizes/displays adequate comfort level or patient's stated pain goal  Description: INTERVENTIONS:  - Encourage pt to monitor pain and request assistance  - Assess pain using appropriate pain scale  - Administer analgesics based on type and severity of pain and evaluate response  - Implement non-pharmacological measures as appropriate and evaluate response  - Consider cultural and social influences on pain and pain management  - Manage/alleviate anxiety  - Utilize distraction and/or relaxation techniques  - Monitor for opioid side effects  - Notify MD/LIP if interventions unsuccessful or patient reports new pain  - Anticipate increased pain with activity and pre-medicate as appropriate  Outcome: Not Progressing     Problem: SAFETY ADULT - FALL  Goal: Free from fall injury  Description: INTERVENTIONS:  - Assess pt frequently for physical needs  - Identify cognitive and physical deficits and behaviors that affect risk of falls.  - [place] fall precautions as indicated by assessment.  - Educate pt/family on patient safety including physical limitations  - Instruct pt to call for assistance with  activity based on assessment  - Modify environment to reduce risk of injury  - Provide assistive devices as appropriate  - Consider OT/PT consult to assist with strengthening/mobility  - Encourage toileting schedule  Outcome: Not Progressing     Problem: DISCHARGE PLANNING  Goal: Discharge to home or other facility with appropriate resources  Description: INTERVENTIONS:  - Identify barriers to discharge w/pt and caregiver  - Include patient/family/discharge partner in discharge planning  - Arrange for needed discharge resources and transportation as appropriate  - Identify discharge learning needs (meds, wound care, etc)  - Arrange for interpreters to assist at discharge as needed  - Consider post-discharge preferences of patient/family/discharge partner  - Complete POLST form as appropriate  - Assess patient's ability to be responsible for managing their own health  - Refer to Case Management Department for coordinating discharge planning if the patient needs post-hospital services based on physician/LIP order or complex needs related to functional status, cognitive ability or social support system  Outcome: Not Progressing     Problem: CARDIOVASCULAR - ADULT  Goal: Maintains optimal cardiac output and hemodynamic stability  Description: INTERVENTIONS:  - Monitor vital signs, rhythm, and trends  - Monitor for bleeding, hypotension and signs of decreased cardiac output  - Evaluate effectiveness of vasoactive medications to optimize hemodynamic stability  - Monitor arterial and/or venous puncture sites for bleeding and/or hematoma  - Assess quality of pulses, skin color and temperature  - Assess for signs of decreased coronary artery perfusion - ex. Angina  - Evaluate fluid balance, assess for edema, trend weights  Outcome: Not Progressing     Problem: NEUROLOGICAL - ADULT  Goal: Achieves stable or improved neurological status  Description: INTERVENTIONS  - Assess for and report changes in neurological status  -  Initiate measures to prevent increased intracranial pressure  - Maintain blood pressure and fluid volume within ordered parameters to optimize cerebral perfusion and minimize risk of hemorrhage  - Monitor temperature, glucose, and sodium. Initiate appropriate interventions as ordered  Outcome: Not Progressing  Goal: Absence of seizures  Description: INTERVENTIONS  - Monitor for seizure activity  - Administer anti-seizure medications as ordered  - Monitor neurological status  Outcome: Not Progressing  Goal: Remains free of injury related to seizure activity  Description: INTERVENTIONS:  - Maintain airway, patient safety  and administer oxygen as ordered  - Monitor patient for seizure activity, document and report duration and description of seizure to MD/LIP  - If seizure occurs, turn patient to side and suction secretions as needed  - Reorient patient post seizure  - Seizure pads on all 4 side rails  - Instruct patient/family to notify RN of any seizure activity  - Instruct patient/family to call for assistance with activity based on assessment  Outcome: Not Progressing     Problem: RESPIRATORY - ADULT  Goal: Achieves optimal ventilation and oxygenation  Description: INTERVENTIONS:  - Assess for changes in respiratory status  - Assess for changes in mentation and behavior  - Position to facilitate oxygenation and minimize respiratory effort  - Oxygen supplementation based on oxygen saturation or ABGs  - Provide Smoking Cessation handout, if applicable  - Encourage broncho-pulmonary hygiene including cough, deep breathe, Incentive Spirometry  - Assess the need for suctioning and perform as needed  - Assess and instruct to report SOB or any respiratory difficulty  - Respiratory Therapy support as indicated  - Manage/alleviate anxiety  - Monitor for signs/symptoms of CO2 retention  Outcome: Not Progressing     Problem: GENITOURINARY - ADULT  Goal: Absence of urinary retention  Description: INTERVENTIONS:  - Assess  patient’s ability to void and empty bladder  - Monitor intake/output and perform bladder scan as needed  - Follow urinary retention protocol/standard of care  - Consider collaborating with pharmacy to review patient's medication profile  - Implement strategies to promote bladder emptying  Outcome: Not Progressing

## 2024-07-01 NOTE — PLAN OF CARE
Problem: Patient Centered Care  Goal: Patient preferences are identified and integrated in the patient's plan of care  Description: Interventions:  - What would you like us to know as we care for you?   - Provide timely, complete, and accurate information to patient/family  - Incorporate patient and family knowledge, values, beliefs, and cultural backgrounds into the planning and delivery of care  - Encourage patient/family to participate in care and decision-making at the level they choose  - Honor patient and family perspectives and choices  Outcome: Progressing     Problem: PAIN - ADULT  Goal: Verbalizes/displays adequate comfort level or patient's stated pain goal  Description: INTERVENTIONS:  - Encourage pt to monitor pain and request assistance  - Assess pain using appropriate pain scale  - Administer analgesics based on type and severity of pain and evaluate response  - Implement non-pharmacological measures as appropriate and evaluate response  - Consider cultural and social influences on pain and pain management  - Manage/alleviate anxiety  - Utilize distraction and/or relaxation techniques  - Monitor for opioid side effects  - Notify MD/LIP if interventions unsuccessful or patient reports new pain  - Anticipate increased pain with activity and pre-medicate as appropriate  Outcome: Progressing     Problem: SAFETY ADULT - FALL  Goal: Free from fall injury  Description: INTERVENTIONS:  - Assess pt frequently for physical needs  - Identify cognitive and physical deficits and behaviors that affect risk of falls.  - [place] fall precautions as indicated by assessment.  - Educate pt/family on patient safety including physical limitations  - Instruct pt to call for assistance with activity based on assessment  - Modify environment to reduce risk of injury  - Provide assistive devices as appropriate  - Consider OT/PT consult to assist with strengthening/mobility  - Encourage toileting schedule  Outcome:  Progressing     Problem: DISCHARGE PLANNING  Goal: Discharge to home or other facility with appropriate resources  Description: INTERVENTIONS:  - Identify barriers to discharge w/pt and caregiver  - Include patient/family/discharge partner in discharge planning  - Arrange for needed discharge resources and transportation as appropriate  - Identify discharge learning needs (meds, wound care, etc)  - Arrange for interpreters to assist at discharge as needed  - Consider post-discharge preferences of patient/family/discharge partner  - Complete POLST form as appropriate  - Assess patient's ability to be responsible for managing their own health  - Refer to Case Management Department for coordinating discharge planning if the patient needs post-hospital services based on physician/LIP order or complex needs related to functional status, cognitive ability or social support system  Outcome: Progressing     Problem: CARDIOVASCULAR - ADULT  Goal: Maintains optimal cardiac output and hemodynamic stability  Description: INTERVENTIONS:  - Monitor vital signs, rhythm, and trends  - Monitor for bleeding, hypotension and signs of decreased cardiac output  - Evaluate effectiveness of vasoactive medications to optimize hemodynamic stability  - Monitor arterial and/or venous puncture sites for bleeding and/or hematoma  - Assess quality of pulses, skin color and temperature  - Assess for signs of decreased coronary artery perfusion - ex. Angina  - Evaluate fluid balance, assess for edema, trend weights  Outcome: Progressing     Problem: NEUROLOGICAL - ADULT  Goal: Achieves stable or improved neurological status  Description: INTERVENTIONS  - Assess for and report changes in neurological status  - Initiate measures to prevent increased intracranial pressure  - Maintain blood pressure and fluid volume within ordered parameters to optimize cerebral perfusion and minimize risk of hemorrhage  - Monitor temperature, glucose, and sodium.  Initiate appropriate interventions as ordered  Outcome: Progressing  Goal: Absence of seizures  Description: INTERVENTIONS  - Monitor for seizure activity  - Administer anti-seizure medications as ordered  - Monitor neurological status  Outcome: Progressing  Goal: Remains free of injury related to seizure activity  Description: INTERVENTIONS:  - Maintain airway, patient safety  and administer oxygen as ordered  - Monitor patient for seizure activity, document and report duration and description of seizure to MD/LIP  - If seizure occurs, turn patient to side and suction secretions as needed  - Reorient patient post seizure  - Seizure pads on all 4 side rails  - Instruct patient/family to notify RN of any seizure activity  - Instruct patient/family to call for assistance with activity based on assessment  Outcome: Progressing     Problem: RESPIRATORY - ADULT  Goal: Achieves optimal ventilation and oxygenation  Description: INTERVENTIONS:  - Assess for changes in respiratory status  - Assess for changes in mentation and behavior  - Position to facilitate oxygenation and minimize respiratory effort  - Oxygen supplementation based on oxygen saturation or ABGs  - Provide Smoking Cessation handout, if applicable  - Encourage broncho-pulmonary hygiene including cough, deep breathe, Incentive Spirometry  - Assess the need for suctioning and perform as needed  - Assess and instruct to report SOB or any respiratory difficulty  - Respiratory Therapy support as indicated  - Manage/alleviate anxiety  - Monitor for signs/symptoms of CO2 retention  Outcome: Progressing     Problem: GENITOURINARY - ADULT  Goal: Absence of urinary retention  Description: INTERVENTIONS:  - Assess patient’s ability to void and empty bladder  - Monitor intake/output and perform bladder scan as needed  - Follow urinary retention protocol/standard of care  - Consider collaborating with pharmacy to review patient's medication profile  - Implement  strategies to promote bladder emptying  Outcome: Progressing

## 2024-07-01 NOTE — PLAN OF CARE
Problem: RESPIRATORY - ADULT  Goal: Achieves optimal ventilation and oxygenation  Description: INTERVENTIONS:  - Assess for changes in respiratory status  - Assess for changes in mentation and behavior  - Position to facilitate oxygenation and minimize respiratory effort  - Oxygen supplementation based on oxygen saturation or ABGs  - Provide Smoking Cessation handout, if applicable  - Encourage broncho-pulmonary hygiene including cough, deep breathe, Incentive Spirometry  - Assess the need for suctioning and perform as needed  - Assess and instruct to report SOB or any respiratory difficulty  - Respiratory Therapy support as indicated  - Manage/alleviate anxiety  - Monitor for signs/symptoms of CO2 retention  Outcome: Progressing   Patient received intubated and on ventilator A/C 12/500/+5/40%. Bilateral breath sounds auscultated. Suction provided when indicated. Due to neuro status, patient is not a candidate for SBT. No acute events during the daytime. RT will continue to monitor.

## 2024-07-01 NOTE — PROGRESS NOTES
Critical Care Progress Note     Assessment / Plan:  Acute respiratory failure - intubated for airway protection  - continue full vent support  - daily SBT per protocol  - minimize sedation as able  Septic shock  - wean phenylephrine gtt as able  - antibiotics   Meningoencephalitis with anoxic brain injury  - antibiotics per ID  - neurology is following  Hypernatremia  - D5W  FEN  - start TFs  Ppx  - subcu heparin  - famotidine  Dispo  - reviewed with family and significant other at bedside  - DNAR/select    Critical care time: 50 minutes      Subjective:  Remains unresponsive    Objective:  Vitals:    07/01/24 1130 07/01/24 1200 07/01/24 1300 07/01/24 1400   BP:  (!) 87/60 91/61 106/74   BP Location:  Right arm Right arm Right arm   Pulse: 96 96 96 97   Resp: 12 12 12 12   Temp:  98.4 °F (36.9 °C)     TempSrc:  Temporal     SpO2: 94% 92% 93% 96%   Weight:       Height:         Physical Exam:  General: intubated  Skin: no rash, ulcers or subcutaneous nodules  Eyes: anicteric sclerae, moist conjunctivae  Head, ears, nose, throat: atraumatic, oropharynx clear with moist mucous membranes  Neck: trachea midline with no thyromegaly  Heart: regular rate and rhythm, no murmurs / rubs / gallops  Lungs: clear bilaterally  Abdomen: soft, nontender, nondistended   Extremities: no edema or cyanosis  Psych: unresponsive    Medications:  Reviewed in EMR    Lab Data:  Reviewed in EMR    Imaging:  I independently visualized all relevant chest imaging in PACS and agree with radiology interpretation except where noted.

## 2024-07-01 NOTE — PLAN OF CARE
Problem: RESPIRATORY - ADULT  Goal: Achieves optimal ventilation and oxygenation  Description: INTERVENTIONS:  - Assess for changes in respiratory status  - Assess for changes in mentation and behavior  - Position to facilitate oxygenation and minimize respiratory effort  - Oxygen supplementation based on oxygen saturation or ABGs  - Provide Smoking Cessation handout, if applicable  - Encourage broncho-pulmonary hygiene including cough, deep breathe, Incentive Spirometry  - Assess the need for suctioning and perform as needed  - Assess and instruct to report SOB or any respiratory difficulty  - Respiratory Therapy support as indicated  - Manage/alleviate anxiety  - Monitor for signs/symptoms of CO2 retention  Outcome: Progressing     Patient remains intubated on full support. Fio2 was weaned to 40%, tolerated well. No other acute events noted this shift.    Vent settings:   ac/vc 12/500/5/40%

## 2024-07-01 NOTE — HOSPICE RN NOTE
[facility] RN spoke with SIM Seals regarding POC for patient and status of compassionate wean, SIM Seals states that family is not ready for compassionate wean at this time and has elected to insert a feeding tube today. [facility] will follow peripherally at this time and remain available if needed by family.     Maggie Ramirez RN, BSN  Transitional Nurse Liaison  [facility]  [phone] (After-hours)

## 2024-07-01 NOTE — DIETARY NOTE
NUTRITION NOTE:       7/1/2024 Update: Consult to initiate tube feeds. Pt is at high risk for refeeding syndrome d/t severe malnutrition- tube feeds to start at low rate and advance slower towards goal. FWF increased to address hypernatremia as recommended by renal service. IVF: D5W @ 75 ml/hr.   See orders below under nutrition intervention     6/30/2024 Update:    EN initiation deferred, pt metabolically unstable, hence not safe to commence EN. And, currently no tube access to utilize   RN reports waiting for family to arrive, with plan to discuss Goals of care.  Nutrition support in future pending outcome of goal of care and decisions.     6/29/2024:  Received MD consult to initiate/manage EN (Enteral Nutrition)   No tube access yet for EN to initiate. Intubate 6/28. Sedated on propofol. On Levophed. Infusing IV NS @ 83ml/hr.   Will check in tomorrow for access and EN start.     NUTRITION INTERVENTION:      NUTRITION PRESCRIPTION:   Estimated Nutrition needs: --dosing wt of 55 kg - wt taken on 6/28  Calories: 0989-2102 calories/day (28-34 calories per kg Dosing wt)  [place]: 1565 kcal/day  Protein: 66-77 g protein/day (1.2-1.4 g protein/kg Dosing wt)  Fluid Needs: ~1ml/kcal      - Tube feeding rx: Jevity 1.2 20 ml/hr; advance 10 ml increments q day to goal 60 ml/hr X ave 22 hr infusion time to provide ~ 1584 kcals, 73 g protein, 1069 ml h20, FWF: 300 ml q 4 hrs (1800 ml), total h20: 2869 ml, 100% jer goal and 100% protein goal.     RD will continue to follow up.       Jasmine Arana RD, LDN, CNSC ([id number])

## 2024-07-01 NOTE — PROGRESS NOTES
NEPHROLOGY DAILY PROGRESS NOTE         SUBJECTIVE:  Intubated, sedated   Family at bedside.      OBJECTIVE:    Total Intake/Output:    Intake/Output Summary (Last 24 hours) at 7/1/2024 1135  Last data filed at 7/1/2024 0655  Gross per 24 hour   Intake 6615.7 ml   Output 3900 ml   Net 2715.7 ml         PHYSICAL EXAM:  /78 (BP Location: Right arm)   Pulse 96   Temp 97.4 °F (36.3 °C) (Temporal)   Resp 12   Ht 5' 7\" (1.702 m)   Wt 118 lb 2.7 oz (53.6 kg)   LMP 06/27/2024   SpO2 94%   BMI 18.51 kg/m²   GEN: intubated, sedated   HEENT:  ETT in place   CHEST: ventilated  CARDIAC: S1S2 normal  ABD: soft, NT/ND  : joshua in place   EXT: no lower ext edema  NEURO: sedated         CURRENT MEDICATIONS:  Current Facility-Administered Medications   Medication Dose Route Frequency    potassium phosphate dibasic 30 mmol in sodium chloride 0.9% 250 mL IVPB  30 mmol Intravenous Once    glucose (Dex4) 15 GM/59ML oral liquid 15 g  15 g Oral Q15 Min PRN    Or    glucose (Glutose) 40% oral gel 15 g  15 g Oral Q15 Min PRN    Or    glucose-vitamin C (Dex-4) chewable tab 4 tablet  4 tablet Oral Q15 Min PRN    Or    dextrose 50% injection 50 mL  50 mL Intravenous Q15 Min PRN    Or    glucose (Dex4) 15 GM/59ML oral liquid 30 g  30 g Oral Q15 Min PRN    Or    glucose (Glutose) 40% oral gel 30 g  30 g Oral Q15 Min PRN    Or    glucose-vitamin C (Dex-4) chewable tab 8 tablet  8 tablet Oral Q15 Min PRN    dextrose 5% infusion   Intravenous Continuous    vasopressin (Vasostrict) 20 Units in sodium chloride 0.9% 100 mL infusion for septic shock  0.01-0.03 Units/min Intravenous Continuous    phenylephrine (Christiano-Synephrine) 50 mg in sodium chloride 0.9% 250 mL infusion   mcg/min Intravenous Continuous    insulin aspart (NovoLOG) 100 Units/mL FlexPen 1-11 Units  1-11 Units Subcutaneous Q6H    labetalol (Trandate) 5 mg/mL injection 10 mg  10 mg Intravenous Q6H PRN    norepinephrine (Levophed) 4 mg/250mL infusion premix  0.5-30  mcg/min Intravenous Continuous    metRONIDAZOLE in sodium chloride 0.79% (Flagyl) 5 mg/mL IVPB premix 500 mg  500 mg Intravenous q6h    levETIRAcetam (Keppra) 500 mg/5mL injection 500 mg  500 mg Intravenous Q12H    aspirin 300 MG rectal suppository 300 mg  300 mg Rectal Daily    famotidine (Pepcid) 20 mg/2mL injection 20 mg  20 mg Intravenous BID    fentaNYL (Sublimaze) 50 mcg/mL injection 25 mcg  25 mcg Intravenous Q30 Min PRN    Or    fentaNYL (Sublimaze) 50 mcg/mL injection 50 mcg  50 mcg Intravenous Q30 Min PRN    polyethylene glycol (PEG 3350) (Miralax) 17 g oral packet 17 g  17 g Oral Daily PRN    senna (Senokot) 8.8 MG/5ML oral syrup 17.6 mg  10 mL Oral Nightly PRN    bisacodyl (Dulcolax) 10 MG rectal suppository 10 mg  10 mg Rectal Daily PRN    fleet enema (Fleet) 7-19 GM/118ML rectal enema 133 mL  1 enema Rectal Once PRN    chlorhexidine gluconate (Peridex) 0.12 % oral solution 15 mL  15 mL Mouth/Throat BID@0800,2000    propofol (Diprivan) 10 mg/mL infusion premix  5-50 mcg/kg/min (Dosing Weight) Intravenous Continuous    acetaminophen (Tylenol Extra Strength) tab 500 mg  500 mg Oral Q4H PRN    ondansetron (Zofran) 4 MG/2ML injection 4 mg  4 mg Intravenous Q6H PRN    prochlorperazine (Compazine) 10 MG/2ML injection 5 mg  5 mg Intravenous Q8H PRN    acyclovir (Zovirax) 550 mg in sodium chloride 0.9% 100 mL IVPB  10 mg/kg Intravenous Q8H    vancomycin (Vancocin) 1,000 mg in sodium chloride 0.9% 250 mL IVPB-ADDV  1,000 mg Intravenous Q12H    LORazepam (Ativan) 2 mg/mL injection 2 mg  2 mg Intravenous Q10 Min PRN    ceFEPIme (Maxpime) 2 g in sodium chloride 0.9% 100 mL IVPB-MBP  2 g Intravenous Q8H    heparin (Porcine) 5000 UNIT/ML injection 5,000 Units  5,000 Units Subcutaneous q12h    acetaminophen (Ofirmev) 10 mg/mL infusion premix 1,000 mg  1,000 mg Intravenous Q6H PRN    hydrALAzine (Apresoline) 20 mg/mL injection 10 mg  10 mg Intravenous Q6H PRN           LABS:  Patient Labs Reviewed in Detail. Pertinent  Labs as follows:  Recent Labs   Lab 07/01/24  0429 07/01/24  0617 07/01/24  0814   GLU 99 92 86   BUN 7* 6* 7*   CREATSERUM 0.72 0.74 0.79   EGFRCR 105 102 94   CA 7.1* 7.1* 6.9*   * 156* 156*   K 3.6 3.6  3.6 3.9   * 129* 129*   CO2 20.0* 20.0* 21.0     Recent Labs   Lab 06/29/24  0542 06/30/24  0434 07/01/24  0617   RBC 3.55* 3.66* 3.59*   HGB 9.2* 9.6* 9.4*   HCT 28.0* 29.0* 28.6*   MCV 78.9* 79.2* 79.7*   MCH 25.9* 26.2 26.2   MCHC 32.9 33.1 32.9   RDW 15.2* 15.9* 16.6*   NEPRELIM 4.53 3.26 3.83   WBC 5.2 3.9* 5.4   .0 264.0 206.0         IMAGING:  XR CHEST AP PORTABLE  (CPT=71045)    Result Date: 6/30/2024  CONCLUSION:   Left approach PICC line terminates overlying the right atrium.  Recommend 5 cm of retraction.  No radiographic evidence of acute cardiopulmonary abnormality.    elm-remote    Dictated by (CST): Albert Sebastian MD on 6/30/2024 at 7:36 AM     Finalized by (CST): Albert Sebastian MD on 6/30/2024 at 7:38 AM          MRI BRAIN MRA BRAIN MRA NECK W+WO (CPT=70551/70630/84102)    Result Date: 6/29/2024  CONCLUSION:  1.  MRI brain:  Reversal normal gray-white differentiation signal within diffusion-weighted in FLAIR images as well as other imaging sequences.  The appearance is compatible with presence of hypoxic ischemic injury. 2.  MRA brain:  There is slow flow demonstrated within the anterior and posterior circulation.  The anterior posterior circulation is otherwise patent based on non MRA brain imaging sequences specifically post-contrast MRA brain sequence series 40. Due to imaging technique assessment for aneurysm is limited. 3.  MRA neck:  Gross patency of the carotid and vertebral arteries without high-grade stenosis, occlusion, or dissection.    Dictated by (CST): Leland Whaley MD on 6/29/2024 at 7:26 PM     Finalized by (CST): Leland Whaley MD on 6/29/2024 at 7:33 PM          CT BRAIN OR HEAD (15650)    Result Date: 6/29/2024  CONCLUSION:  1.  Diffuse loss of gray-white  differentiation suggesting hypoxic/anoxic brain injury. 2.  Nonspecific high density surrounding the cerebellar hemispheres which may be due to evolving ischemic change or blood brain barrier breakdown.   Exam discussed with Dr. Uriostegui on 6/29/24 at 5:51 p.m.  Dictated by (CST): Leland Whaley MD on 6/29/2024 at 5:46 PM     Finalized by (CST): Leland Whaley MD on 6/29/2024 at 5:53 PM            ASSESSMENT AND PLAN:   This is 43 year old female with no sig PMH. Presented with headaches. Currently admitted for sepsis, encephalopathy and possible meningoencephalitis complicated by seizure.  Nephrology is consulted for hypernatremia.      Hypernatremia  - suspect rapid correction of Na due to polyuria   - current free water deficit when accounting for urinary losses is >4L for goal of 150 mEq  - continue d5W, BMP q8 hours  - would add significant free water flushes about 300ml/q4 hours   - Management of hyperglycemia per primary   - monitor urine output      Hypokalemia   Hypophosphatemia   - receiving replacement   - follow K and phos level        D/w RN     We will continue to follow DO Sanjana Lewisy - Nephrology

## 2024-07-01 NOTE — PROGRESS NOTES
[facility] Hospitalist Progress Note     CC: Hospital Follow up    PCP: No primary care provider on file.       Assessment/Plan:   Patient is a 43-year-old female with no known medical history, who presents to the hospital for evaluation of headaches and feeling ill.  Being encephalopathic with meningitis/encephalitis as the likely cause.  6/28/2024 patient was intubated for airway protection. 6/29/24 with fixed dilated pupils, no gag reflex.  Serial CTH and MRI with diffuse anoxic brain injury.       Sepsis  Encephalopathy  Meningitis/Encephalitis  -CT brain and MRI brain 6/29/2024 with diffuse loss of gray-white differentiate consistent with anoxic injury   -on cefepime, vancomycin, acyclovir, flagyl  -LP done on 6/27  -6/29/24 with fixed dilated pupils, CTH with brain edema   -mannitol attempted but still with cerebral edema noted   -poor prognosis.   -discussed in detail with mother at bedside. Unlikely chance of neurologic recovery      Acute respiratory failure  -intubated 6/28/24  -critical care following       Seizure  -on keppra  -neuro consult      Elevated liver enzymes  -alk phos high earlier, now coming down   -borderline ast/alt  -ammonia normal   -RUQ ultrasound noted      Hyponatremia with rapid increase to hypernatremia   -monitor sodium   -mannitol stopped   -nephrology consulted   -D5 ongoing      Fluids: D5  Diet: NPO  Code status: FULL     Goals of care discussed again with family. Mother bedside today, as well as son. They were definitely in shock of patient's condition.   We addressed code status, initially they wanted to change to full, but after further discussion on her findings, prognosis, and likely poor outcome, we decided to keep her DNR.  I feel they need time to cope with this. Difficult situation.   They understand that she is critical. I was honest with them and mentioned that she has signs of brain death even on exam.   Discussed to have whatever family they need at  bedside, and we can re-assess situation day by day. Would recommend compassionate wean at some point, but does not have to be today, in order to give them time to cope with this.      Critical care time 35 minutes     Alejandro Morejon [facility] [place] Hospitalist        Subjective:     On ventilator  Not breathing over vent  No gag reflex, no corneal, and has dilated pupils.     OBJECTIVE:    Blood pressure 109/78, pulse 96, temperature 97.4 °F (36.3 °C), temperature source Temporal, resp. rate 12, height 5' 7\" (1.702 m), weight 118 lb 2.7 oz (53.6 kg), last menstrual period 06/27/2024, SpO2 94%.    Temp:  [96.2 °F (35.7 °C)-100.5 °F (38.1 °C)] 97.4 °F (36.3 °C)  Pulse:  [] 96  Resp:  [12-26] 12  BP: ()/(47-91) 109/78  SpO2:  [90 %-97 %] 94 %  FiO2 (%):  [30 %-50 %] 40 %      Intake/Output:    Intake/Output Summary (Last 24 hours) at 7/1/2024 1351  Last data filed at 7/1/2024 0655  Gross per 24 hour   Intake 6615.7 ml   Output 3900 ml   Net 2715.7 ml       Last 3 Weights   07/01/24 0405 118 lb 2.7 oz (53.6 kg)   06/30/24 0522 118 lb 9.7 oz (53.8 kg)   06/29/24 0357 126 lb 12.2 oz (57.5 kg)   06/27/24 0704 121 lb 4.1 oz (55 kg)   06/27/24 0042 92 lb (41.7 kg)       /78 (BP Location: Right arm)   Pulse 96   Temp 97.4 °F (36.3 °C) (Temporal)   Resp 12   Ht 5' 7\" (1.702 m)   Wt 118 lb 2.7 oz (53.6 kg)   LMP 06/27/2024   SpO2 94%   BMI 18.51 kg/m²   General: Alert, no acute distress  Lungs: Mechanical breath sounds  Abdomen: soft  Extremities: No edema  Neuro: Fixed dilated pupils, no gag reflex     Data Review:       Labs:     Recent Labs   Lab 06/29/24  0542 06/30/24  0434 07/01/24  0617   RBC 3.55* 3.66* 3.59*   HGB 9.2* 9.6* 9.4*   HCT 28.0* 29.0* 28.6*   MCV 78.9* 79.2* 79.7*   MCH 25.9* 26.2 26.2   MCHC 32.9 33.1 32.9   RDW 15.2* 15.9* 16.6*   NEPRELIM 4.53 3.26 3.83   WBC 5.2 3.9* 5.4   .0 264.0 206.0         Recent Labs   Lab 07/01/24  0429 07/01/24  0617 07/01/24  0814   GLU  99 92 86   BUN 7* 6* 7*   CREATSERUM 0.72 0.74 0.79   EGFRCR 105 102 94   CA 7.1* 7.1* 6.9*   * 156* 156*   K 3.6 3.6  3.6 3.9   * 129* 129*   CO2 20.0* 20.0* 21.0       Recent Labs   Lab 06/27/24  0123 06/28/24  0653 06/29/24  0542 06/30/24  0434 07/01/24  0617   ALT 60*  56* 48 35 35 29   AST 77*  76* 78* 64* 71* 78*   ALB 4.4  4.2 3.3 2.5* 2.7* 2.2*         Imaging:  XR CHEST AP PORTABLE  (CPT=71045)    Result Date: 6/30/2024  CONCLUSION:   Left approach PICC line terminates overlying the right atrium.  Recommend 5 cm of retraction.  No radiographic evidence of acute cardiopulmonary abnormality.    elm-remote    Dictated by (CST): Albert Sebastian MD on 6/30/2024 at 7:36 AM     Finalized by (CST): Albert Sebastian MD on 6/30/2024 at 7:38 AM          MRI BRAIN MRA BRAIN MRA NECK W+WO (CPT=70551/98223/87933)    Result Date: 6/29/2024  CONCLUSION:  1.  MRI brain:  Reversal normal gray-white differentiation signal within diffusion-weighted in FLAIR images as well as other imaging sequences.  The appearance is compatible with presence of hypoxic ischemic injury. 2.  MRA brain:  There is slow flow demonstrated within the anterior and posterior circulation.  The anterior posterior circulation is otherwise patent based on non MRA brain imaging sequences specifically post-contrast MRA brain sequence series 40. Due to imaging technique assessment for aneurysm is limited. 3.  MRA neck:  Gross patency of the carotid and vertebral arteries without high-grade stenosis, occlusion, or dissection.    Dictated by (CST): Leland Whaley MD on 6/29/2024 at 7:26 PM     Finalized by (CST): Leland Whaley MD on 6/29/2024 at 7:33 PM          CT BRAIN OR HEAD ([id number])    Result Date: 6/29/2024  CONCLUSION:  1.  Diffuse loss of gray-white differentiation suggesting hypoxic/anoxic brain injury. 2.  Nonspecific high density surrounding the cerebellar hemispheres which may be due to evolving ischemic change or blood brain barrier  breakdown.   Exam discussed with Dr. Uriostegui on 6/29/24 at 5:51 p.m.  Dictated by (CST): Leland Whaley MD on 6/29/2024 at 5:46 PM     Finalized by (CST): Leland Whaley MD on 6/29/2024 at 5:53 PM          CT BRAIN OR HEAD ([id number])    Result Date: 6/29/2024  CONCLUSION:  Subtle questioned hypodensity with questioned local mass effect in the posterior right greater than left parietal occipital regions.  Finding could be artifactual.  Recommend MRI brain for further assessment to exclude underlying infarct, press, or other  process.  Findings were discussed with nurse Marleen at 8:00 Eastern time on 06/29/2024 by Dr. Long   Hypodensity within the left basal ganglia, demonstrated to represent a chronic lacunar infarction on previous MRI.  Previously described signal changes within the right mesial temporal lobe, concerning for infectious encephalitis versus seizure changes or other process not well appreciated by CT.  Secretions within the paranasal sinuses, new from prior.  Correlate with symptoms of sinusitis.      Dictated by (CST): Mery Michel MD on 6/29/2024 at 12:01 PM     Finalized by (CST): Mery Michel MD on 6/29/2024 at 12:11 PM          XR CHEST AP PORTABLE  (CPT=71045)    Result Date: 6/29/2024  CONCLUSION: Post right upper extremity PICC placement.  The tip extends into the contralateral left subclavian vein across the midline.  Recommend replacement of the line or retraction by approximately 13 cm.    Exam discussed with Dr. Humphries 6/29/24 at 11:40 a.m.  Dictated by (CST): Leland Whaley MD on 6/29/2024 at 11:35 AM     Finalized by (CST): Leland Whaley MD on 6/29/2024 at 11:40 AM          XR CHEST AP PORTABLE  (CPT=71045)    Result Date: 6/28/2024  CONCLUSION:  1. Normal heart size with prominent pulmonary vascularity and mild interstitial edema may suggest developing cardiac failure/fluid overload.  Development of moderate bibasilar alveolar airspace disease suggesting either bibasilar pneumonia, or  pulmonary edema pattern.  Small left pleural effusion suggested.  ET tube in the trachea the level of the clavicles.  No pneumothorax.    Dictated by (CST): Fernie Gonzalez MD on 6/28/2024 at 4:31 PM     Finalized by (CST): Fernie Gonzalez MD on 6/28/2024 at 4:35 PM             Meds:      potassium phosphate dibasic 30 mmol in sodium chloride 0.9% 250 mL IVPB  30 mmol Intravenous Once    doxycycline  200 mg Intravenous Once    [START ON 7/2/2024] doxycycline  100 mg Intravenous Q12H    insulin aspart  1-11 Units Subcutaneous Q6H    metRONIDAZOLE  500 mg Intravenous q6h    levETIRAcetam  500 mg Intravenous Q12H    aspirin  300 mg Rectal Daily    famotidine  20 mg Intravenous BID    chlorhexidine gluconate  15 mL Mouth/Throat BID@0800,2000    acyclovir  10 mg/kg Intravenous Q8H    vancomycin  1,000 mg Intravenous Q12H    cefepime  2 g Intravenous Q8H    heparin  5,000 Units Subcutaneous q12h      dextrose 10%      dextrose 75 mL/hr at 07/01/24 0615    vasopressin (Vasostrict) 20 Units in sodium chloride 0.9% 100 mL infusion for septic shock 0.03 Units/min (07/01/24 1000)    phenylephrine 150 mcg/min (07/01/24 1000)    norepinephrine Stopped (06/30/24 2330)    propofol 10 mcg/kg/min (07/01/24 1117)       dextrose 10%    lipase-protease-amylase (Lip-Prot-Amyl) **AND** sodium bicarbonate    glucose **OR** glucose **OR** glucose-vitamin C **OR** dextrose **OR** glucose **OR** glucose **OR** glucose-vitamin C    labetalol    fentaNYL **OR** fentaNYL    polyethylene glycol (PEG 3350)    senna    bisacodyl    fleet enema    acetaminophen    ondansetron    prochlorperazine    LORazepam    acetaminophen    hydrALAzine

## 2024-07-01 NOTE — PROGRESS NOTES
[facility]  part of [facility] Infectious Disease Consult    Calos Hernandez Patient Status:  Inpatient    1980 MRN [id number]   Location [facility] 2W/SW Attending Alejandro Bob,    Hosp Day # 4 PCP No primary care provider on file.       Calos Hernandez seen and examined,   Afebrile,  Intubated now,   Not interactive,   Family at the bed side,     History:  History reviewed. No pertinent past medical history.  History reviewed. No pertinent surgical history.  No family history on file.       Allergies:  Not on File    Medications:    Current Facility-Administered Medications:     [START ON 2024] doxycycline hyclate (Vibramycin) 100 mg in sodium chloride 0.9% 100 mL IVPB, 100 mg, Intravenous, Q12H    dextrose 10% infusion (TPN no rate), , Intravenous, Continuous PRN    pancrelipase (Lip-Prot-Amyl) (Zenpep) DR particles cap 10,000 Units, 10,000 Units, Per G Tube, PRN **AND** sodium bicarbonate tab 325 mg, 325 mg, Oral, PRN    glucose (Dex4) 15 GM/59ML oral liquid 15 g, 15 g, Oral, Q15 Min PRN **OR** glucose (Glutose) 40% oral gel 15 g, 15 g, Oral, Q15 Min PRN **OR** glucose-vitamin C (Dex-4) chewable tab 4 tablet, 4 tablet, Oral, Q15 Min PRN **OR** dextrose 50% injection 50 mL, 50 mL, Intravenous, Q15 Min PRN **OR** glucose (Dex4) 15 GM/59ML oral liquid 30 g, 30 g, Oral, Q15 Min PRN **OR** glucose (Glutose) 40% oral gel 30 g, 30 g, Oral, Q15 Min PRN **OR** glucose-vitamin C (Dex-4) chewable tab 8 tablet, 8 tablet, Oral, Q15 Min PRN    dextrose 5% infusion, , Intravenous, Continuous    vasopressin (Vasostrict) 20 Units in sodium chloride 0.9% 100 mL infusion for septic shock, 0.01-0.03 Units/min, Intravenous, Continuous    phenylephrine (Christiano-Synephrine) 50 mg in sodium chloride 0.9% 250 mL infusion,  mcg/min, Intravenous, Continuous    insulin aspart (NovoLOG) 100 Units/mL FlexPen 1-11 Units, 1-11 Units, Subcutaneous, Q6H    labetalol (Trandate) 5 mg/mL injection  10 mg, 10 mg, Intravenous, Q6H PRN    norepinephrine (Levophed) 4 mg/250mL infusion premix, 0.5-30 mcg/min, Intravenous, Continuous    metRONIDAZOLE in sodium chloride 0.79% (Flagyl) 5 mg/mL IVPB premix 500 mg, 500 mg, Intravenous, q6h    levETIRAcetam (Keppra) 500 mg/5mL injection 500 mg, 500 mg, Intravenous, Q12H    aspirin 300 MG rectal suppository 300 mg, 300 mg, Rectal, Daily    famotidine (Pepcid) 20 mg/2mL injection 20 mg, 20 mg, Intravenous, BID    fentaNYL (Sublimaze) 50 mcg/mL injection 25 mcg, 25 mcg, Intravenous, Q30 Min PRN **OR** fentaNYL (Sublimaze) 50 mcg/mL injection 50 mcg, 50 mcg, Intravenous, Q30 Min PRN    polyethylene glycol (PEG 3350) (Miralax) 17 g oral packet 17 g, 17 g, Oral, Daily PRN    senna (Senokot) 8.8 MG/5ML oral syrup 17.6 mg, 10 mL, Oral, Nightly PRN    bisacodyl (Dulcolax) 10 MG rectal suppository 10 mg, 10 mg, Rectal, Daily PRN    fleet enema (Fleet) 7-19 GM/118ML rectal enema 133 mL, 1 enema, Rectal, Once PRN    chlorhexidine gluconate (Peridex) 0.12 % oral solution 15 mL, 15 mL, Mouth/Throat, BID@0800,2000    propofol (Diprivan) 10 mg/mL infusion premix, 5-50 mcg/kg/min (Dosing Weight), Intravenous, Continuous    acetaminophen (Tylenol Extra Strength) tab 500 mg, 500 mg, Oral, Q4H PRN    ondansetron (Zofran) 4 MG/2ML injection 4 mg, 4 mg, Intravenous, Q6H PRN    prochlorperazine (Compazine) 10 MG/2ML injection 5 mg, 5 mg, Intravenous, Q8H PRN    acyclovir (Zovirax) 550 mg in sodium chloride 0.9% 100 mL IVPB, 10 mg/kg, Intravenous, Q8H    vancomycin (Vancocin) 1,000 mg in sodium chloride 0.9% 250 mL IVPB-ADDV, 1,000 mg, Intravenous, Q12H    LORazepam (Ativan) 2 mg/mL injection 2 mg, 2 mg, Intravenous, Q10 Min PRN    ceFEPIme (Maxpime) 2 g in sodium chloride 0.9% 100 mL IVPB-MBP, 2 g, Intravenous, Q8H    heparin (Porcine) 5000 UNIT/ML injection 5,000 Units, 5,000 Units, Subcutaneous, q12h    acetaminophen (Ofirmev) 10 mg/mL infusion premix 1,000 mg, 1,000 mg, Intravenous, Q6H  PRN    hydrALAzine (Apresoline) 20 mg/mL injection 10 mg, 10 mg, Intravenous, Q6H PRN    Review of Systems:   Constitutional: Negative for anorexia, chills, fatigue, fevers, malaise, night sweats and weight loss.  Eyes: Negative for visual disturbance, irritation and redness.  Ears, nose, mouth, throat, and face: Negative for hearing loss, tinnitus, nasal congestion, snoring, sore throat, hoarseness and voice change.  Respiratory: Negative for cough, sputum, hemoptysis, chest pain, wheezing, dyspnea on exertion, or stridor.  Cardiovascular: Negative for chest pain, palpitations, irregular heart beats, syncope, fatigue, orthopnea, paroxysmal nocturnal dyspnea, lower extremity edema.  Gastrointestinal: Negative for dysphagia, odynophagia, reflux symptoms, nausea, vomiting, change in bowel habits, diarrhea, constipation and abdominal pain.  Integument/breast: Negative for rash, skin lesions, and pruritus.  Hematologic/lymphatic: Negative for easy bruising, bleeding, and lymphadenopathy.  Musculoskeletal: Negative for myalgias, arthralgias, muscle weakness.  Neurological: Negative for headaches, dizziness, seizures, memory problems, trouble swallowing, speech problems, gait problems and weakness.  Behavioral/Psych: Negative for active tobacco use.  Endocrine: No history of of diabetes, thyroid disorder.  Unable to obtain,     Vital signs in last 24 hours:  Patient Vitals for the past 24 hrs:   BP Temp Temp src Pulse Resp SpO2 Height Weight   06/27/24 1000 (!) 164/103 -- -- (!) 121 (!) 31 100 % -- --   06/27/24 0900 (!) 151/122 -- -- 102 (!) 46 98 % -- --   06/27/24 0800 157/83 99.4 °F (37.4 °C) Temporal 100 25 97 % -- --   06/27/24 0704 -- -- -- -- -- -- 5' 7\" (1.702 m) 121 lb 4.1 oz (55 kg)   06/27/24 0700 (!) 163/88 -- -- 113 22 98 % -- --   06/27/24 0646 -- (!) 101.1 °F (38.4 °C) Temporal -- -- -- -- --   06/27/24 0600 (!) 168/87 -- -- 94 25 97 % -- --   06/27/24 0529 -- (!) 102.5 °F (39.2 °C) Rectal -- -- -- -- --    06/27/24 0500 (!) 155/97 -- -- (!) 124 (!) 28 97 % -- --   06/27/24 0445 (!) 157/95 -- -- 118 (!) 35 100 % -- --   06/27/24 0400 -- -- -- 81 (!) 35 100 % -- --   06/27/24 0230 (!) 165/97 -- -- 91 (!) 36 100 % -- --   06/27/24 0145 (!) 189/98 -- -- 115 25 96 % -- --   06/27/24 0122 -- 98.8 °F (37.1 °C) Temporal -- -- -- -- --   06/27/24 0042 -- -- -- -- -- -- -- 92 lb (41.7 kg)   06/27/24 0041 (!) 176/78 98.2 °F (36.8 °C) Temporal 99 18 99 % -- --       Physical Exam:   General: alert, cooperative, oriented.  No respiratory distress.   Head: Normocephalic, without obvious abnormality, atraumatic.   Eyes: Conjunctivae/corneas clear.  No scleral icterus.  No conjunctival     hemorrhage.   Nose: Nares normal.   Throat: Lips, mucosa, and tongue normal.  No thrush noted.   Neck: Soft, supple neck; trachea midline, no adenopathy, no thyromegaly.   Lungs: CTAB, normal and equal bilateral chest rise   Chest wall: No tenderness or deformity.   Heart: Regular rate and rhythm, normal S1S2, no murmur.   Abdomen: soft, non-tender, non-distended, no masses, no guarding, no     rebound, positive BS.   Extremity: no edema, no cyanosis   Skin: No rashes or lesions.   Neurological: Alert, interactive, no focal deficits    Labs:  Lab Results   Component Value Date    WBC 5.4 07/01/2024    HGB 9.4 07/01/2024    HCT 28.6 07/01/2024    .0 07/01/2024    CREATSERUM 0.79 07/01/2024    BUN 7 07/01/2024     07/01/2024    K 3.9 07/01/2024     07/01/2024    CO2 21.0 07/01/2024    GLU 86 07/01/2024    CA 6.9 07/01/2024    ALB 2.2 07/01/2024    ALKPHO 230 07/01/2024    BILT 0.6 07/01/2024    TP 5.9 07/01/2024    AST 78 07/01/2024    ALT 29 07/01/2024    MG 1.4 07/01/2024    PHOS 2.1 07/01/2024       Radiology:  Reviewed,       Cultures:  Reviewed,     Assessment and Plan:    1.  Syndrome of Fever, headache and AMS:   - Complained of toothache at home, headaches for few days, had a Sz in the ER waiting room,   - On supplemental  O2 and being monitored for airway protection,   - Differential includes meningoencephalitis with possible aspiration,   - CSF with , glucose 36 and Protein 321, follow Cul, MEP negative for HSV I & II.   - MRI of head reviewed, hyperintense abn in temporal lobe,  - Blood cul are NGTD  - RVP is negative,   - Strep Pneumo antigen in urine is negative,  - Crypto Ag is negative - has a bird at home,    - Continue IV Vanc and Acyclcovir, IV Cefepime, Flagyl, d # 3,     2.   Acute hypoxic resp failure: Intubated for airway protection, fixed pupils, no gag reflex.  - Thierry purulent secretions during intubation, Trach aspirate Cul negative,   - RVP negative,     3.    Disposition: inhouse, a middle aged female admitted with headache and AMS with fever and a new Sz, Possible Meningoencephalitis, although HSV PCR negative in CSF, enhancement of temporal lobe, will continue acyclovir, Overall critically sick, discussed with family,   - Follow pending cul, serologies,   - Continue IV Vanc and Acyclovir, IV Cefepime with Flagyl,   - Supportive care,     Discussed with family,  all questions answered, further recommendations to follow, Thanks,  Thank you for consulting [facility] for Calos Hernandez.  If you have any questions or concerns please call [facility] Infectious Disease at [phone].     Michael Camejo MD  6/27/2024  12:52 PM

## 2024-07-01 NOTE — PLAN OF CARE
Problem: Patient Centered Care  Goal: Patient preferences are identified and integrated in the patient's plan of care  Description: Interventions:  - What would you like us to know as we care for you?   - Provide timely, complete, and accurate information to patient/family  - Incorporate patient and family knowledge, values, beliefs, and cultural backgrounds into the planning and delivery of care  - Encourage patient/family to participate in care and decision-making at the level they choose  - Honor patient and family perspectives and choices  Outcome: Progressing     Problem: PAIN - ADULT  Goal: Verbalizes/displays adequate comfort level or patient's stated pain goal  Description: INTERVENTIONS:  - Encourage pt to monitor pain and request assistance  - Assess pain using appropriate pain scale  - Administer analgesics based on type and severity of pain and evaluate response  - Implement non-pharmacological measures as appropriate and evaluate response  - Consider cultural and social influences on pain and pain management  - Manage/alleviate anxiety  - Utilize distraction and/or relaxation techniques  - Monitor for opioid side effects  - Notify MD/LIP if interventions unsuccessful or patient reports new pain  - Anticipate increased pain with activity and pre-medicate as appropriate  Outcome: Progressing     Problem: SAFETY ADULT - FALL  Goal: Free from fall injury  Description: INTERVENTIONS:  - Assess pt frequently for physical needs  - Identify cognitive and physical deficits and behaviors that affect risk of falls.  - [place] fall precautions as indicated by assessment.  - Educate pt/family on patient safety including physical limitations  - Instruct pt to call for assistance with activity based on assessment  - Modify environment to reduce risk of injury  - Provide assistive devices as appropriate  - Consider OT/PT consult to assist with strengthening/mobility  - Encourage toileting schedule  Outcome:  Progressing     Problem: DISCHARGE PLANNING  Goal: Discharge to home or other facility with appropriate resources  Description: INTERVENTIONS:  - Identify barriers to discharge w/pt and caregiver  - Include patient/family/discharge partner in discharge planning  - Arrange for needed discharge resources and transportation as appropriate  - Identify discharge learning needs (meds, wound care, etc)  - Arrange for interpreters to assist at discharge as needed  - Consider post-discharge preferences of patient/family/discharge partner  - Complete POLST form as appropriate  - Assess patient's ability to be responsible for managing their own health  - Refer to Case Management Department for coordinating discharge planning if the patient needs post-hospital services based on physician/LIP order or complex needs related to functional status, cognitive ability or social support system  Outcome: Progressing     Problem: CARDIOVASCULAR - ADULT  Goal: Maintains optimal cardiac output and hemodynamic stability  Description: INTERVENTIONS:  - Monitor vital signs, rhythm, and trends  - Monitor for bleeding, hypotension and signs of decreased cardiac output  - Evaluate effectiveness of vasoactive medications to optimize hemodynamic stability  - Monitor arterial and/or venous puncture sites for bleeding and/or hematoma  - Assess quality of pulses, skin color and temperature  - Assess for signs of decreased coronary artery perfusion - ex. Angina  - Evaluate fluid balance, assess for edema, trend weights  Outcome: Progressing     Problem: NEUROLOGICAL - ADULT  Goal: Achieves stable or improved neurological status  Description: INTERVENTIONS  - Assess for and report changes in neurological status  - Initiate measures to prevent increased intracranial pressure  - Maintain blood pressure and fluid volume within ordered parameters to optimize cerebral perfusion and minimize risk of hemorrhage  - Monitor temperature, glucose, and sodium.  Initiate appropriate interventions as ordered  Outcome: Progressing  Goal: Absence of seizures  Description: INTERVENTIONS  - Monitor for seizure activity  - Administer anti-seizure medications as ordered  - Monitor neurological status  Outcome: Progressing  Goal: Remains free of injury related to seizure activity  Description: INTERVENTIONS:  - Maintain airway, patient safety  and administer oxygen as ordered  - Monitor patient for seizure activity, document and report duration and description of seizure to MD/LIP  - If seizure occurs, turn patient to side and suction secretions as needed  - Reorient patient post seizure  - Seizure pads on all 4 side rails  - Instruct patient/family to notify RN of any seizure activity  - Instruct patient/family to call for assistance with activity based on assessment  Outcome: Progressing     Problem: RESPIRATORY - ADULT  Goal: Achieves optimal ventilation and oxygenation  Description: INTERVENTIONS:  - Assess for changes in respiratory status  - Assess for changes in mentation and behavior  - Position to facilitate oxygenation and minimize respiratory effort  - Oxygen supplementation based on oxygen saturation or ABGs  - Provide Smoking Cessation handout, if applicable  - Encourage broncho-pulmonary hygiene including cough, deep breathe, Incentive Spirometry  - Assess the need for suctioning and perform as needed  - Assess and instruct to report SOB or any respiratory difficulty  - Respiratory Therapy support as indicated  - Manage/alleviate anxiety  - Monitor for signs/symptoms of CO2 retention  Outcome: Progressing     Problem: GENITOURINARY - ADULT  Goal: Absence of urinary retention  Description: INTERVENTIONS:  - Assess patient’s ability to void and empty bladder  - Monitor intake/output and perform bladder scan as needed  - Follow urinary retention protocol/standard of care  - Consider collaborating with pharmacy to review patient's medication profile  - Implement  strategies to promote bladder emptying  Outcome: Progressing

## 2024-07-01 NOTE — PLAN OF CARE
Problem: RESPIRATORY - ADULT  Goal: Achieves optimal ventilation and oxygenation  Description: INTERVENTIONS:  - Assess for changes in respiratory status  - Assess for changes in mentation and behavior  - Position to facilitate oxygenation and minimize respiratory effort  - Oxygen supplementation based on oxygen saturation or ABGs  - Provide Smoking Cessation handout, if applicable  - Encourage broncho-pulmonary hygiene including cough, deep breathe, Incentive Spirometry  - Assess the need for suctioning and perform as needed  - Assess and instruct to report SOB or any respiratory difficulty  - Respiratory Therapy support as indicated  - Manage/alleviate anxiety  - Monitor for signs/symptoms of CO2 retention  Outcome: Not Progressing     Pt remains intubated with ETT size 7.0 @ 24 cm at the lip, on full support. Pt desaturated earlier today and RN increased FiO2 to 40%. Currently pt saturating 96%, suction provided as needed, RT will continue to monitor.    Vent settings and readings as follow:      06/30/24 2050   Vent Information   Is this patient on Chronic Ventilation? No   Interface Invasive   Vent Type O  ([place])   Vent plugged into main power? Yes   Vent ID    Vent Mode VC/AC   Settings   FiO2 (%) 40 %   Resp Rate (Set) 12   Vt (Set, mL) 500 mL   Waveform Decelerating ramp   PEEP/CPAP (cm H2O) 5 cm H20   Insp Time (sec) 1 sec   Trigger Sensitivity Flow (L/min) 2 L/min   Humidification Heater   H2O Bag Level 3/4 Full   Heater Temperature 99.5 °F (37.5 °C)   Readings   Total RR 13   Minute Ventilation (L/min) 6 L/min   Expiratory Tidal Volume 465 mL   PIP Observed (cm H2O) 27 cm H2O   MAP (cm H2O) 8   I/E Ratio 1:4.0   Plateau Pressure (cm H2O) 22 cm H2O   Static Compliance (L/cm H2O) 32   Dynamic Compliance (L/cm H2O) 24 L/cm H2O   Alarms   High RR 40   Low RR 10   Insp Pressure High (cm H2O) 45 cm H2O   Insp Pressure Low (cm H2O) 10 cm H2O   MV High (L/min) 20 L/min   MV Low (L/min) 3 L/min    Apnea Interval (sec) 20 seconds   Apnea Rate 12   Apnea Volume (mL) 500 mL      06/30/24 2050   ETT   Placement Date: 06/28/24   Airway Size: 7 mm  Cuffed: Cuffed  Insertion attempts: 1  Placement Verification: Colorimetric EtCO2 device  Placed By: ICU physician   Secured at (cm) 24 cm   Suctioned? N   Measured From Lips   Secured Location Center   Secured by Commercial tube myers   Req'd equipment at bedside Bag mask   Additional Assessments   Pulse 106   Resp 13   SpO2 96 %

## 2024-07-02 NOTE — PLAN OF CARE
Problem: Patient Centered Care  Goal: Patient preferences are identified and integrated in the patient's plan of care  Description: Interventions:  - What would you like us to know as we care for you?   - Provide timely, complete, and accurate information to patient/family  - Incorporate patient and family knowledge, values, beliefs, and cultural backgrounds into the planning and delivery of care  - Encourage patient/family to participate in care and decision-making at the level they choose  - Honor patient and family perspectives and choices  Outcome: Progressing     Problem: PAIN - ADULT  Goal: Verbalizes/displays adequate comfort level or patient's stated pain goal  Description: INTERVENTIONS:  - Encourage pt to monitor pain and request assistance  - Assess pain using appropriate pain scale  - Administer analgesics based on type and severity of pain and evaluate response  - Implement non-pharmacological measures as appropriate and evaluate response  - Consider cultural and social influences on pain and pain management  - Manage/alleviate anxiety  - Utilize distraction and/or relaxation techniques  - Monitor for opioid side effects  - Notify MD/LIP if interventions unsuccessful or patient reports new pain  - Anticipate increased pain with activity and pre-medicate as appropriate  Outcome: Progressing     Problem: SAFETY ADULT - FALL  Goal: Free from fall injury  Description: INTERVENTIONS:  - Assess pt frequently for physical needs  - Identify cognitive and physical deficits and behaviors that affect risk of falls.  - [place] fall precautions as indicated by assessment.  - Educate pt/family on patient safety including physical limitations  - Instruct pt to call for assistance with activity based on assessment  - Modify environment to reduce risk of injury  - Provide assistive devices as appropriate  - Consider OT/PT consult to assist with strengthening/mobility  - Encourage toileting schedule  Outcome:  Progressing     Problem: DISCHARGE PLANNING  Goal: Discharge to home or other facility with appropriate resources  Description: INTERVENTIONS:  - Identify barriers to discharge w/pt and caregiver  - Include patient/family/discharge partner in discharge planning  - Arrange for needed discharge resources and transportation as appropriate  - Identify discharge learning needs (meds, wound care, etc)  - Arrange for interpreters to assist at discharge as needed  - Consider post-discharge preferences of patient/family/discharge partner  - Complete POLST form as appropriate  - Assess patient's ability to be responsible for managing their own health  - Refer to Case Management Department for coordinating discharge planning if the patient needs post-hospital services based on physician/LIP order or complex needs related to functional status, cognitive ability or social support system  Outcome: Progressing     Problem: CARDIOVASCULAR - ADULT  Goal: Maintains optimal cardiac output and hemodynamic stability  Description: INTERVENTIONS:  - Monitor vital signs, rhythm, and trends  - Monitor for bleeding, hypotension and signs of decreased cardiac output  - Evaluate effectiveness of vasoactive medications to optimize hemodynamic stability  - Monitor arterial and/or venous puncture sites for bleeding and/or hematoma  - Assess quality of pulses, skin color and temperature  - Assess for signs of decreased coronary artery perfusion - ex. Angina  - Evaluate fluid balance, assess for edema, trend weights  Outcome: Progressing     Problem: NEUROLOGICAL - ADULT  Goal: Achieves stable or improved neurological status  Description: INTERVENTIONS  - Assess for and report changes in neurological status  - Initiate measures to prevent increased intracranial pressure  - Maintain blood pressure and fluid volume within ordered parameters to optimize cerebral perfusion and minimize risk of hemorrhage  - Monitor temperature, glucose, and sodium.  Initiate appropriate interventions as ordered  Outcome: Progressing  Goal: Absence of seizures  Description: INTERVENTIONS  - Monitor for seizure activity  - Administer anti-seizure medications as ordered  - Monitor neurological status  Outcome: Progressing  Goal: Remains free of injury related to seizure activity  Description: INTERVENTIONS:  - Maintain airway, patient safety  and administer oxygen as ordered  - Monitor patient for seizure activity, document and report duration and description of seizure to MD/LIP  - If seizure occurs, turn patient to side and suction secretions as needed  - Reorient patient post seizure  - Seizure pads on all 4 side rails  - Instruct patient/family to notify RN of any seizure activity  - Instruct patient/family to call for assistance with activity based on assessment  Outcome: Progressing     Problem: RESPIRATORY - ADULT  Goal: Achieves optimal ventilation and oxygenation  Description: INTERVENTIONS:  - Assess for changes in respiratory status  - Assess for changes in mentation and behavior  - Position to facilitate oxygenation and minimize respiratory effort  - Oxygen supplementation based on oxygen saturation or ABGs  - Provide Smoking Cessation handout, if applicable  - Encourage broncho-pulmonary hygiene including cough, deep breathe, Incentive Spirometry  - Assess the need for suctioning and perform as needed  - Assess and instruct to report SOB or any respiratory difficulty  - Respiratory Therapy support as indicated  - Manage/alleviate anxiety  - Monitor for signs/symptoms of CO2 retention  Outcome: Progressing     Problem: GENITOURINARY - ADULT  Goal: Absence of urinary retention  Description: INTERVENTIONS:  - Assess patient’s ability to void and empty bladder  - Monitor intake/output and perform bladder scan as needed  - Follow urinary retention protocol/standard of care  - Consider collaborating with pharmacy to review patient's medication profile  - Implement  strategies to promote bladder emptying  Outcome: Progressing

## 2024-07-02 NOTE — PLAN OF CARE
Neurology plan of care    Paged by RN that patient's mother wanted to speak to neurology with questions.      I explained the degree of injury based on the patient's clinical exam and her imaging studies supporting this:  Loss of brainstem reflexes, encephalopathy, CT brain with extensive anoxic injury confirmed on MRI brain.  MRA brain also shows poor blood flow secondary to cerebral edema.  We discussed this was a consequence of the meningitis.      The patient's mother wants another MRI brain and EEG.  When asked for further detail about why she wants the tests (ie, if she thought the tests would show improvement or change), she says she wants it \"for herself\" so I explained updated testing would show similar or worse findings than before.  I explained that cerebral tissue does not recover.     I explained we had separate imaging modalities showing the extensive irreversible damage to the patient's brain.      I also explained my concerns about putting the patient flat into an MRI machine.  She has extensive cerebral edema due to the anoxic injury and this could herniate when the patient is laid flat.  I explained to her in layman's terms what this meant.      An EEG will not change the grim prognosis for neurological recovery and I discussed this with the patient's mother to set expectations.  I will defer the EEG to the primary team to order and follow up.      ADDI Uriostegui DO  7/2/2024 5:44 PM

## 2024-07-02 NOTE — PROGRESS NOTES
Critical Care Progress Note     Assessment / Plan:  Acute respiratory failure - intubated for airway protection  - continue full vent support  - daily SBT per protocol  - minimize sedation as able  Septic shock  - vasopressors as needed  - antibiotics   Meningoencephalitis with anoxic brain injury  - antibiotics per ID  - neurology is following  Hypernatremia  - D5W  FEN  - TFs  Ppx  - subcu heparin  - famotidine  Dispo  - DNAR/select  - poor prognosis discussed with family    Critical care time: 35 minutes      Subjective:  Remains unresponsive    Objective:  Vitals:    07/02/24 0315 07/02/24 0400 07/02/24 0500 07/02/24 0600   BP: 91/57 96/57 104/62 103/62   BP Location: Right arm Right arm Right arm Right arm   Pulse:  99 102 100   Resp:  12 12 12   Temp:  97.6 °F (36.4 °C)     TempSrc:  Temporal     SpO2:  98% 98% 98%   Weight:   120 lb 5.9 oz (54.6 kg)    Height:         Physical Exam:  General: intubated  Skin: no rash, ulcers or subcutaneous nodules  Eyes: anicteric sclerae, moist conjunctivae  Head, ears, nose, throat: atraumatic, oropharynx clear with moist mucous membranes  Neck: trachea midline with no thyromegaly  Heart: regular rate and rhythm, no murmurs / rubs / gallops  Lungs: clear bilaterally  Abdomen: soft, nontender, nondistended   Extremities: no edema or cyanosis  Psych: unresponsive    Medications:  Reviewed in EMR    Lab Data:  Reviewed in EMR    Imaging:  I independently visualized all relevant chest imaging in PACS and agree with radiology interpretation except where noted.

## 2024-07-02 NOTE — PROGRESS NOTES
NEPHROLOGY DAILY PROGRESS NOTE         SUBJECTIVE:  Intubated, sedated       OBJECTIVE:    Total Intake/Output:    Intake/Output Summary (Last 24 hours) at 7/2/2024 1154  Last data filed at 7/2/2024 0619  Gross per 24 hour   Intake 6864.7 ml   Output 1675 ml   Net 5189.7 ml         PHYSICAL EXAM:  BP 93/56 (BP Location: Right arm)   Pulse 100   Temp 97.8 °F (36.6 °C) (Temporal)   Resp 12   Ht 5' 7\" (1.702 m)   Wt 120 lb 5.9 oz (54.6 kg)   LMP 06/27/2024   SpO2 96%   BMI 18.85 kg/m²   GEN: intubated, sedated   HEENT:  ETT in place   CHEST: ventilated  CARDIAC: S1S2 normal  ABD: soft, NT/ND  : joshua in place   EXT: trace lower ext edema  NEURO: sedated         CURRENT MEDICATIONS:  Current Facility-Administered Medications   Medication Dose Route Frequency    magnesium sulfate 4 g/100mL IVPB premix 4 g  4 g Intravenous Once    doxycycline hyclate (Vibramycin) 100 mg in sodium chloride 0.9% 100 mL IVPB  100 mg Intravenous Q12H    dextrose 10% infusion (TPN no rate)   Intravenous Continuous PRN    pancrelipase (Lip-Prot-Amyl) (Zenpep) DR particles cap 10,000 Units  10,000 Units Per G Tube PRN    And    sodium bicarbonate tab 325 mg  325 mg Oral PRN    glucose (Dex4) 15 GM/59ML oral liquid 15 g  15 g Oral Q15 Min PRN    Or    glucose (Glutose) 40% oral gel 15 g  15 g Oral Q15 Min PRN    Or    glucose-vitamin C (Dex-4) chewable tab 4 tablet  4 tablet Oral Q15 Min PRN    Or    dextrose 50% injection 50 mL  50 mL Intravenous Q15 Min PRN    Or    glucose (Dex4) 15 GM/59ML oral liquid 30 g  30 g Oral Q15 Min PRN    Or    glucose (Glutose) 40% oral gel 30 g  30 g Oral Q15 Min PRN    Or    glucose-vitamin C (Dex-4) chewable tab 8 tablet  8 tablet Oral Q15 Min PRN    vasopressin (Vasostrict) 20 Units in sodium chloride 0.9% 100 mL infusion for septic shock  0.01-0.03 Units/min Intravenous Continuous    phenylephrine (Christiano-Synephrine) 50 mg in sodium chloride 0.9% 250 mL infusion   mcg/min Intravenous Continuous     insulin aspart (NovoLOG) 100 Units/mL FlexPen 1-11 Units  1-11 Units Subcutaneous Q6H    labetalol (Trandate) 5 mg/mL injection 10 mg  10 mg Intravenous Q6H PRN    norepinephrine (Levophed) 4 mg/250mL infusion premix  0.5-30 mcg/min Intravenous Continuous    metRONIDAZOLE in sodium chloride 0.79% (Flagyl) 5 mg/mL IVPB premix 500 mg  500 mg Intravenous q6h    levETIRAcetam (Keppra) 500 mg/5mL injection 500 mg  500 mg Intravenous Q12H    aspirin 300 MG rectal suppository 300 mg  300 mg Rectal Daily    famotidine (Pepcid) 20 mg/2mL injection 20 mg  20 mg Intravenous BID    fentaNYL (Sublimaze) 50 mcg/mL injection 25 mcg  25 mcg Intravenous Q30 Min PRN    Or    fentaNYL (Sublimaze) 50 mcg/mL injection 50 mcg  50 mcg Intravenous Q30 Min PRN    polyethylene glycol (PEG 3350) (Miralax) 17 g oral packet 17 g  17 g Oral Daily PRN    senna (Senokot) 8.8 MG/5ML oral syrup 17.6 mg  10 mL Oral Nightly PRN    bisacodyl (Dulcolax) 10 MG rectal suppository 10 mg  10 mg Rectal Daily PRN    fleet enema (Fleet) 7-19 GM/118ML rectal enema 133 mL  1 enema Rectal Once PRN    chlorhexidine gluconate (Peridex) 0.12 % oral solution 15 mL  15 mL Mouth/Throat BID@0800,2000    propofol (Diprivan) 10 mg/mL infusion premix  5-50 mcg/kg/min (Dosing Weight) Intravenous Continuous    acetaminophen (Tylenol Extra Strength) tab 500 mg  500 mg Oral Q4H PRN    ondansetron (Zofran) 4 MG/2ML injection 4 mg  4 mg Intravenous Q6H PRN    prochlorperazine (Compazine) 10 MG/2ML injection 5 mg  5 mg Intravenous Q8H PRN    acyclovir (Zovirax) 550 mg in sodium chloride 0.9% 100 mL IVPB  10 mg/kg Intravenous Q8H    vancomycin (Vancocin) 1,000 mg in sodium chloride 0.9% 250 mL IVPB-ADDV  1,000 mg Intravenous Q12H    LORazepam (Ativan) 2 mg/mL injection 2 mg  2 mg Intravenous Q10 Min PRN    ceFEPIme (Maxpime) 2 g in sodium chloride 0.9% 100 mL IVPB-MBP  2 g Intravenous Q8H    heparin (Porcine) 5000 UNIT/ML injection 5,000 Units  5,000 Units Subcutaneous q12h     acetaminophen (Ofirmev) 10 mg/mL infusion premix 1,000 mg  1,000 mg Intravenous Q6H PRN    hydrALAzine (Apresoline) 20 mg/mL injection 10 mg  10 mg Intravenous Q6H PRN           LABS:  Patient Labs Reviewed in Detail. Pertinent Labs as follows:  Recent Labs   Lab 07/01/24  1623 07/02/24  0019 07/02/24  0630   GLU 82 90 88  85   BUN 7* 9 11  11   CREATSERUM 0.80 0.90 1.07*  1.10*   EGFRCR 93 80 65  63   CA 6.8* 7.6* 7.7*  7.3*   * 153* 149*  149*   K 4.1 4.5 4.9  4.9   * 126* 122*  123*   CO2 19.0* 18.0* 18.0*  18.0*     Recent Labs   Lab 06/30/24  0434 07/01/24  0617 07/02/24  0630   RBC 3.66* 3.59* 3.76*   HGB 9.6* 9.4* 9.7*   HCT 29.0* 28.6* 30.2*   MCV 79.2* 79.7* 80.3   MCH 26.2 26.2 25.8*   MCHC 33.1 32.9 32.1   RDW 15.9* 16.6* 17.6*   NEPRELIM 3.26 3.83 8.10*   WBC 3.9* 5.4 11.3*   .0 206.0 209.0         IMAGING:  XR CHEST AP PORTABLE  (CPT=71045)    Result Date: 7/1/2024  CONCLUSION:   Diffuse bilateral interstitial opacity which could reflect edema or infection.  Hazy opacity in the right lower lung which may reflect a layering right pleural effusion.  Patchy right perihilar opacity which could reflect atelectasis with or without superimposed pneumonia.  Interval placement of a Dobbhoff tube which terminates in the proximal stomach.  The distal aspect of the tuft tube is mildly coiled in the proximal stomach with the tip in the region of the gastric fundus.  Left PICC line redemonstrated.  The tip of the PICC line is somewhat ill-defined but may be in the region of the upper right atrium.      Dictated by (CST): Phillip Omalley MD on 7/01/2024 at 4:01 PM     Finalized by (CST): Phillip Omalley MD on 7/01/2024 at 4:06 PM            ASSESSMENT AND PLAN:   This is 43 year old female with no sig PMH. Presented with headaches. Currently admitted for sepsis, encephalopathy and possible meningoencephalitis complicated by seizure.  Nephrology is consulted for hypernatremia.       Hypernatremia  - Suspect rapid correction of Na due to polyuria   - Current free water deficit when accounting for urinary losses is >2.5L for goal of 140 mEq  - Will stop D5W  - Continue free water flushes 300ml/q4 hours   - Management of hyperglycemia per primary   - Monitor urine output      Elevated creatinine  -Likely pre-renal/ATN in setting of hypoperfusion, concern for possible acyclovir induced nephropathy?  -Maintain renal perfusion pressures  -Check vancomycin trough level  -Will obtain urinalysis with urine lytes  -Monitor intake/output closely  -Avoid nephrotoxins and renally dose medications for creatinine clearance    Metabolic acidosis  - Monitor closely    Hypokalemia   Hypophosphatemia   Hypomagnesemia  - Replace per protocol           D/w RN     We will continue to follow DO Sanjana Lewisy - Nephrology

## 2024-07-02 NOTE — PROGRESS NOTES
Received PT intubated with a (7) ETT secured at (23) on vent with the following settings; BS heard bilaterally, SXN provided as needed. No changes made, RT to continue to monitor.        07/02/24 0512   Vent Information   Vent Mode VC/AC   Settings   FiO2 (%) 40 %   Resp Rate (Set) 12   Vt (Set, mL) 500 mL   Waveform Decelerating ramp   PEEP/CPAP (cm H2O) 5 cm H20   Insp Time (sec) 1 sec   Trigger Sensitivity Flow (L/min) 2 L/min   Humidification Heater   H2O Bag Level 1/4 Full  (less than)   Heater Temperature 98.6 °F (37 °C)   Readings   Total RR 12   Minute Ventilation (L/min) 5.1 L/min   Expiratory Tidal Volume 431 mL   PIP Observed (cm H2O) 28 cm H2O   MAP (cm H2O) 9   I/E Ratio 1:4   Static Compliance (L/cm H2O) 30   Dynamic Compliance (L/cm H2O) 26 L/cm H2O

## 2024-07-02 NOTE — PLAN OF CARE
Problem: PAIN - ADULT  Goal: Verbalizes/displays adequate comfort level or patient's stated pain goal  Description: INTERVENTIONS:  - Encourage pt to monitor pain and request assistance  - Assess pain using appropriate pain scale  - Administer analgesics based on type and severity of pain and evaluate response  - Implement non-pharmacological measures as appropriate and evaluate response  - Consider cultural and social influences on pain and pain management  - Manage/alleviate anxiety  - Utilize distraction and/or relaxation techniques  - Monitor for opioid side effects  - Notify MD/LIP if interventions unsuccessful or patient reports new pain  - Anticipate increased pain with activity and pre-medicate as appropriate  Outcome: Progressing     Problem: SAFETY ADULT - FALL  Goal: Free from fall injury  Description: INTERVENTIONS:  - Assess pt frequently for physical needs  - Identify cognitive and physical deficits and behaviors that affect risk of falls.  - [place] fall precautions as indicated by assessment.  - Educate pt/family on patient safety including physical limitations  - Instruct pt to call for assistance with activity based on assessment  - Modify environment to reduce risk of injury  - Provide assistive devices as appropriate  - Consider OT/PT consult to assist with strengthening/mobility  - Encourage toileting schedule  Outcome: Progressing     Problem: DISCHARGE PLANNING  Goal: Discharge to home or other facility with appropriate resources  Description: INTERVENTIONS:  - Identify barriers to discharge w/pt and caregiver  - Include patient/family/discharge partner in discharge planning  - Arrange for needed discharge resources and transportation as appropriate  - Identify discharge learning needs (meds, wound care, etc)  - Arrange for interpreters to assist at discharge as needed  - Consider post-discharge preferences of patient/family/discharge partner  - Complete POLST form as appropriate  - Assess  patient's ability to be responsible for managing their own health  - Refer to Case Management Department for coordinating discharge planning if the patient needs post-hospital services based on physician/LIP order or complex needs related to functional status, cognitive ability or social support system  Outcome: Not Progressing     Problem: CARDIOVASCULAR - ADULT  Goal: Maintains optimal cardiac output and hemodynamic stability  Description: INTERVENTIONS:  - Monitor vital signs, rhythm, and trends  - Monitor for bleeding, hypotension and signs of decreased cardiac output  - Evaluate effectiveness of vasoactive medications to optimize hemodynamic stability  - Monitor arterial and/or venous puncture sites for bleeding and/or hematoma  - Assess quality of pulses, skin color and temperature  - Assess for signs of decreased coronary artery perfusion - ex. Angina  - Evaluate fluid balance, assess for edema, trend weights  Outcome: Progressing     Problem: NEUROLOGICAL - ADULT  Goal: Achieves stable or improved neurological status  Description: INTERVENTIONS  - Assess for and report changes in neurological status  - Initiate measures to prevent increased intracranial pressure  - Maintain blood pressure and fluid volume within ordered parameters to optimize cerebral perfusion and minimize risk of hemorrhage  - Monitor temperature, glucose, and sodium. Initiate appropriate interventions as ordered  Outcome: Not Progressing  Goal: Absence of seizures  Description: INTERVENTIONS  - Monitor for seizure activity  - Administer anti-seizure medications as ordered  - Monitor neurological status  Outcome: Progressing  Goal: Remains free of injury related to seizure activity  Description: INTERVENTIONS:  - Maintain airway, patient safety  and administer oxygen as ordered  - Monitor patient for seizure activity, document and report duration and description of seizure to MD/LIP  - If seizure occurs, turn patient to side and suction  secretions as needed  - Reorient patient post seizure  - Seizure pads on all 4 side rails  - Instruct patient/family to notify RN of any seizure activity  - Instruct patient/family to call for assistance with activity based on assessment  Outcome: Progressing     Problem: RESPIRATORY - ADULT  Goal: Achieves optimal ventilation and oxygenation  Description: INTERVENTIONS:  - Assess for changes in respiratory status  - Assess for changes in mentation and behavior  - Position to facilitate oxygenation and minimize respiratory effort  - Oxygen supplementation based on oxygen saturation or ABGs  - Provide Smoking Cessation handout, if applicable  - Encourage broncho-pulmonary hygiene including cough, deep breathe, Incentive Spirometry  - Assess the need for suctioning and perform as needed  - Assess and instruct to report SOB or any respiratory difficulty  - Respiratory Therapy support as indicated  - Manage/alleviate anxiety  - Monitor for signs/symptoms of CO2 retention  Outcome: Progressing     Problem: GENITOURINARY - ADULT  Goal: Absence of urinary retention  Description: INTERVENTIONS:  - Assess patient’s ability to void and empty bladder  - Monitor intake/output and perform bladder scan as needed  - Follow urinary retention protocol/standard of care  - Consider collaborating with pharmacy to review patient's medication profile  - Implement strategies to promote bladder emptying  Outcome: Progressing

## 2024-07-02 NOTE — PROGRESS NOTES
[facility] Hospitalist Progress Note     CC: Hospital Follow up    PCP: No primary care provider on file.       Assessment/Plan:   Patient is a 43-year-old female with no known medical history, who presents to the hospital for evaluation of headaches and feeling ill.  Being encephalopathic with meningitis/encephalitis as the likely cause.  6/28/2024 patient was intubated for airway protection. 6/29/24 with fixed dilated pupils, no gag reflex.  Serial CTH and MRI with diffuse anoxic brain injury.       Sepsis  Encephalopathy  Meningitis/Encephalitis  -CT brain and MRI brain 6/29/2024 with diffuse loss of gray-white differentiate consistent with anoxic injury   -on cefepime, vancomycin, acyclovir, flagyl  -LP done on 6/27  -mannitol attempted but still had cerebral edema noted   -poor prognosis  -discussed in detail with mother at bedside on 7/1. Neurologic recovery unlikely   -on pressors. Would aim to no escalate therapy.      Acute respiratory failure  -intubated 6/28/24  -critical care on consult     Seizure  -on keppra  -neuro consult      Elevated liver enzymes  -alk phos high earlier, now coming down   -borderline ast/alt  -ammonia normal   -RUQ ultrasound noted   -unclear etiology, likely secondary to underlying infection      Hyponatremia with rapid increase to hypernatremia   -Sodium improved from yesterday   -mannitol stopped   -nephrology consulted   -D5 infusion      Fluids: D5W  Diet: feeding tube  Code status: DNR    7/1: Goals of care discussed again with family. Mother bedside, as well as son. They were definitely in shock of patient's condition. We addressed code status, initially they wanted to change to full, but after further discussion on her findings, prognosis, and likely poor outcome, we decided to keep her DNR. Seemed they needed time to cope with this. Difficult situation.   They understand that she is critical. I mentioned that she has signs of brain death on exam.   Discussed to  have whatever family they need at bedside, and we can re-assess situation day by day. Would recommend compassionate wean when family is ready.    [facility]ist        Subjective:     On ventilator  Not breathing over vent  No gag reflex, no corneal, and has dilated pupils.   On pressors (vaso and jessica)    OBJECTIVE:    Blood pressure 96/57, pulse 101, temperature 97.8 °F (36.6 °C), temperature source Temporal, resp. rate 12, height 5' 7\" (1.702 m), weight 120 lb 5.9 oz (54.6 kg), last menstrual period 06/27/2024, SpO2 92%.    Temp:  [96.1 °F (35.6 °C)-98.4 °F (36.9 °C)] 97.8 °F (36.6 °C)  Pulse:  [] 101  Resp:  [12-16] 12  BP: ()/(47-90) 96/57  SpO2:  [92 %-98 %] 92 %  FiO2 (%):  [40 %] 40 %      Intake/Output:    Intake/Output Summary (Last 24 hours) at 7/2/2024 0946  Last data filed at 7/2/2024 0619  Gross per 24 hour   Intake 6864.7 ml   Output 1675 ml   Net 5189.7 ml       Last 3 Weights   07/02/24 0500 120 lb 5.9 oz (54.6 kg)   07/01/24 0405 118 lb 2.7 oz (53.6 kg)   06/30/24 0522 118 lb 9.7 oz (53.8 kg)   06/29/24 0357 126 lb 12.2 oz (57.5 kg)   06/27/24 0704 121 lb 4.1 oz (55 kg)   06/27/24 0042 92 lb (41.7 kg)       BP 96/57 (BP Location: Right arm)   Pulse 101   Temp 97.8 °F (36.6 °C) (Temporal)   Resp 12   Ht 5' 7\" (1.702 m)   Wt 120 lb 5.9 oz (54.6 kg)   LMP 06/27/2024   SpO2 92%   BMI 18.85 kg/m²   General: intubated  Lungs: Mechanical breath sounds  Abdomen: soft  Extremities: No edema  Neuro: Fixed dilated pupils, no gag reflex     Data Review:       Labs:     Recent Labs   Lab 06/30/24  0434 07/01/24  0617 07/02/24  0630   RBC 3.66* 3.59* 3.76*   HGB 9.6* 9.4* 9.7*   HCT 29.0* 28.6* 30.2*   MCV 79.2* 79.7* 80.3   MCH 26.2 26.2 25.8*   MCHC 33.1 32.9 32.1   RDW 15.9* 16.6* 17.6*   NEPRELIM 3.26 3.83 8.10*   WBC 3.9* 5.4 11.3*   .0 206.0 209.0         Recent Labs   Lab 07/01/24  1623 07/02/24  0019 07/02/24  0630   GLU 82 90 88  85   BUN 7* 9  11  11   CREATSERUM 0.80 0.90 1.07*  1.10*   EGFRCR 93 80 65  63   CA 6.8* 7.6* 7.7*  7.3*   * 153* 149*  149*   K 4.1 4.5 4.9  4.9   * 126* 122*  123*   CO2 19.0* 18.0* 18.0*  18.0*       Recent Labs   Lab 06/28/24  0653 06/29/24  0542 06/30/24  0434 07/01/24  0617 07/02/24  0630   ALT 48 35 35 29 38   AST 78* 64* 71* 78* 107*   ALB 3.3 2.5* 2.7* 2.2* 2.3*         Imaging:  XR CHEST AP PORTABLE  (CPT=71045)    Result Date: 7/1/2024  CONCLUSION:   Diffuse bilateral interstitial opacity which could reflect edema or infection.  Hazy opacity in the right lower lung which may reflect a layering right pleural effusion.  Patchy right perihilar opacity which could reflect atelectasis with or without superimposed pneumonia.  Interval placement of a Dobbhoff tube which terminates in the proximal stomach.  The distal aspect of the tuft tube is mildly coiled in the proximal stomach with the tip in the region of the gastric fundus.  Left PICC line redemonstrated.  The tip of the PICC line is somewhat ill-defined but may be in the region of the upper right atrium.      Dictated by (CST): Phillip Omalley MD on 7/01/2024 at 4:01 PM     Finalized by (CST): Phillip Omalley MD on 7/01/2024 at 4:06 PM          XR CHEST AP PORTABLE  (CPT=71045)    Result Date: 6/30/2024  CONCLUSION:   Left approach PICC line terminates overlying the right atrium.  Recommend 5 cm of retraction.  No radiographic evidence of acute cardiopulmonary abnormality.    elm-remote    Dictated by (CST): Albert Sebastian MD on 6/30/2024 at 7:36 AM     Finalized by (CST): Albert Sebastian MD on 6/30/2024 at 7:38 AM          MRI BRAIN MRA BRAIN MRA NECK W+WO (CPT=70551/64421/13716)    Result Date: 6/29/2024  CONCLUSION:  1.  MRI brain:  Reversal normal gray-white differentiation signal within diffusion-weighted in FLAIR images as well as other imaging sequences.  The appearance is compatible with presence of hypoxic ischemic injury. 2.  MRA brain:   There is slow flow demonstrated within the anterior and posterior circulation.  The anterior posterior circulation is otherwise patent based on non MRA brain imaging sequences specifically post-contrast MRA brain sequence series 40. Due to imaging technique assessment for aneurysm is limited. 3.  MRA neck:  Gross patency of the carotid and vertebral arteries without high-grade stenosis, occlusion, or dissection.    Dictated by (CST): Leland Whaley MD on 6/29/2024 at 7:26 PM     Finalized by (CST): Leland Whaley MD on 6/29/2024 at 7:33 PM          CT BRAIN OR HEAD (99167)    Result Date: 6/29/2024  CONCLUSION:  1.  Diffuse loss of gray-white differentiation suggesting hypoxic/anoxic brain injury. 2.  Nonspecific high density surrounding the cerebellar hemispheres which may be due to evolving ischemic change or blood brain barrier breakdown.   Exam discussed with Dr. Uriostegui on 6/29/24 at 5:51 p.m.  Dictated by (CST): Leland Whaley MD on 6/29/2024 at 5:46 PM     Finalized by (CST): Leland Whaley MD on 6/29/2024 at 5:53 PM          XR CHEST AP PORTABLE  (CPT=71045)    Result Date: 6/29/2024  CONCLUSION: Post right upper extremity PICC placement.  The tip extends into the contralateral left subclavian vein across the midline.  Recommend replacement of the line or retraction by approximately 13 cm.    Exam discussed with Dr. Humphries 6/29/24 at 11:40 a.m.  Dictated by (CST): Leland Whaley MD on 6/29/2024 at 11:35 AM     Finalized by (CST): Leland Whaley MD on 6/29/2024 at 11:40 AM             Meds:      doxycycline  100 mg Intravenous Q12H    insulin aspart  1-11 Units Subcutaneous Q6H    metRONIDAZOLE  500 mg Intravenous q6h    levETIRAcetam  500 mg Intravenous Q12H    aspirin  300 mg Rectal Daily    famotidine  20 mg Intravenous BID    chlorhexidine gluconate  15 mL Mouth/Throat BID@0800,2000    acyclovir  10 mg/kg Intravenous Q8H    vancomycin  1,000 mg Intravenous Q12H    cefepime  2 g Intravenous Q8H    heparin  5,000 Units  Subcutaneous q12h      dextrose 10%      dextrose 75 mL/hr at 07/02/24 0621    vasopressin (Vasostrict) 20 Units in sodium chloride 0.9% 100 mL infusion for septic shock 0.03 Units/min (07/02/24 0800)    phenylephrine 180 mcg/min (07/02/24 0800)    norepinephrine Stopped (06/30/24 2330)    propofol Stopped (07/02/24 0000)       dextrose 10%    lipase-protease-amylase (Lip-Prot-Amyl) **AND** sodium bicarbonate    glucose **OR** glucose **OR** glucose-vitamin C **OR** dextrose **OR** glucose **OR** glucose **OR** glucose-vitamin C    labetalol    fentaNYL **OR** fentaNYL    polyethylene glycol (PEG 3350)    senna    bisacodyl    fleet enema    acetaminophen    ondansetron    prochlorperazine    LORazepam    acetaminophen    hydrALAzine

## 2024-07-02 NOTE — RESPIRATORY THERAPY NOTE
Pt received on vent  AC/VC 12 500 +5 40%    Suctioned PRN, bilateral bs auscultated, performed vent check.    RT to continue to monitor

## 2024-07-02 NOTE — PROGRESS NOTES
[facility]  part of [facility] Infectious Disease  Progress Note    Calos Hernandez Patient Status:  Inpatient    1980 MRN [id number]   Location [facility] 2W/SW Attending Alejandro Bob, DO   Hosp Day # 5 PCP No primary care provider on file.     Subjective:  Patient seen and examined in bed. Clinical course reviewed. Remains intubated on pressors. Pupils fixed and dilated. Remains afebrile.     Objective:  Blood pressure 103/62, pulse 100, temperature 97.6 °F (36.4 °C), temperature source Temporal, resp. rate 12, height 5' 7\" (1.702 m), weight 120 lb 5.9 oz (54.6 kg), last menstrual period 2024, SpO2 98%.    Intake/Output:    Intake/Output Summary (Last 24 hours) at 2024 0750  Last data filed at 2024 0619  Gross per 24 hour   Intake 6864.7 ml   Output 1675 ml   Net 5189.7 ml       Physical Exam:  General: Intubated.  HEENT:  Oropharynx clear, trachea ML. ETT in place. +Dobhoff.  Heart: RRR S1S2 no murmurs.  Lungs: Essentially CTA b/l, no rhonchi, rales, wheezes.  Abdomen: Soft, NT/ND.  BS present.  No organomegaly.  Extremity: No edema.  Neurological: Unresponsive. Pupils fixed, dilated.   Derm:  Warm and dry.    Lab Data Review:  Lab Results   Component Value Date    WBC 11.3 2024    HGB 9.7 2024    HCT 30.2 2024    .0 2024    CREATSERUM 1.07 2024    CREATSERUM 1.10 2024    BUN 11 2024    BUN 11 2024     2024     2024    K 4.9 2024    K 4.9 2024     2024     2024    CO2 18.0 2024    CO2 18.0 2024    GLU 88 2024    GLU 85 2024    CA 7.7 2024    CA 7.3 2024    ALB 2.3 2024    ALKPHO 292 2024    BILT 0.8 2024    TP 6.0 2024     2024    ALT 38 2024    MG 1.0 2024    PHOS 4.5 2024        Cultures:  Hospital Encounter on 24   1. CSF culture      Status: None    Collection Time: 06/27/24  4:00 PM    Specimen: CSF, lumbar puncture; Cerebral spinal fluid   Result Value Ref Range    CSF Culture Result No Growth 3 Days N/A    CSF Smear This is a cytocentrifuged smear. N/A    CSF Smear 1+ WBCs seen N/A    CSF Smear No organisms seen N/A   2. Urine Culture, Routine     Status: None    Collection Time: 06/27/24  1:44 PM    Specimen: Urine, joshua catheter   Result Value Ref Range    Urine Culture No Growth at 18-24 hrs. N/A   3. Blood Culture     Status: None    Collection Time: 06/27/24  4:09 AM    Specimen: Blood,peripheral   Result Value Ref Range    Blood Culture Result No Growth 5 Days N/A       Radiology:  XR CHEST AP PORTABLE  (CPT=71045)    Result Date: 7/1/2024  CONCLUSION:   Diffuse bilateral interstitial opacity which could reflect edema or infection.  Hazy opacity in the right lower lung which may reflect a layering right pleural effusion.  Patchy right perihilar opacity which could reflect atelectasis with or without superimposed pneumonia.  Interval placement of a Dobbhoff tube which terminates in the proximal stomach.  The distal aspect of the tuft tube is mildly coiled in the proximal stomach with the tip in the region of the gastric fundus.  Left PICC line redemonstrated.  The tip of the PICC line is somewhat ill-defined but may be in the region of the upper right atrium.      Dictated by (CST): Phillip Omalley MD on 7/01/2024 at 4:01 PM     Finalized by (CST): Phillip Omalley MD on 7/01/2024 at 4:06 PM          XR CHEST AP PORTABLE  (CPT=71045)    Result Date: 6/30/2024  CONCLUSION:   Left approach PICC line terminates overlying the right atrium.  Recommend 5 cm of retraction.  No radiographic evidence of acute cardiopulmonary abnormality.    elm-remote    Dictated by (CST): Albert Sebastian MD on 6/30/2024 at 7:36 AM     Finalized by (CST): Albert Sebastian MD on 6/30/2024 at 7:38 AM          MRI BRAIN MRA BRAIN MRA NECK W+WO  (CQL=12519/51272/14507)    Result Date: 6/29/2024  CONCLUSION:  1.  MRI brain:  Reversal normal gray-white differentiation signal within diffusion-weighted in FLAIR images as well as other imaging sequences.  The appearance is compatible with presence of hypoxic ischemic injury. 2.  MRA brain:  There is slow flow demonstrated within the anterior and posterior circulation.  The anterior posterior circulation is otherwise patent based on non MRA brain imaging sequences specifically post-contrast MRA brain sequence series 40. Due to imaging technique assessment for aneurysm is limited. 3.  MRA neck:  Gross patency of the carotid and vertebral arteries without high-grade stenosis, occlusion, or dissection.    Dictated by (CST): Leland Whaley MD on 6/29/2024 at 7:26 PM     Finalized by (CST): Leland Whaley MD on 6/29/2024 at 7:33 PM          CT BRAIN OR HEAD (07592)    Result Date: 6/29/2024  CONCLUSION:  1.  Diffuse loss of gray-white differentiation suggesting hypoxic/anoxic brain injury. 2.  Nonspecific high density surrounding the cerebellar hemispheres which may be due to evolving ischemic change or blood brain barrier breakdown.   Exam discussed with Dr. Uriostegui on 6/29/24 at 5:51 p.m.  Dictated by (CST): Leland Whaley MD on 6/29/2024 at 5:46 PM     Finalized by (CST): Leland Whaley MD on 6/29/2024 at 5:53 PM          XR CHEST AP PORTABLE  (CPT=71045)    Result Date: 6/29/2024  CONCLUSION: Post right upper extremity PICC placement.  The tip extends into the contralateral left subclavian vein across the midline.  Recommend replacement of the line or retraction by approximately 13 cm.    Exam discussed with Dr. Humphries 6/29/24 at 11:40 a.m.  Dictated by (CST): Leland Whaley MD on 6/29/2024 at 11:35 AM     Finalized by (CST): Leland Whaley MD on 6/29/2024 at 11:40 AM             Assessment and Plan:  1.  Syndrome of fever, headaches and AMS  - Patient with c/o toothache at home with headaches for a few days.  - Suffered seizure  in the ED waiting room.  - Differential includes meningoencephalitis with possible aspiration.  - Blood cultures NGTD.  - Urine culture NGTD.  - RVP negative.  - Urine Strep pneumo Ag are negative.  - S/p LP on 6/27/24.  - CSF with , Glucose 36 and Protein 321.  - CSF culture negative.  - MEP negative for HSV.  - Crypto Ag negative.  - MRI, 6/27, with cortical/subcortical T2/FLAIR hyperintense signal abnormality in R mesial temporal lobe with corresponding nodular enhancement and equivocal associated diffusion restriction. Chronic lacunar infarct in L basal nuclei with no other acute or subacute infarction. Mild leptomeningeal/vascular hyper-enhancement at cerebellum.   - MRI/MRA, 6/29, with reversal normal gray-white differentiation signal within diffusion-weighted in FLAIR images as well as other imaging sequences c/w hypoxic ischemic injury.   - Neuro following.  - IV vancomycin + acyclovir + cefepime + Flagyl, ongoing.     2.  Acute hypoxic respiratory failure  - Intubated for airway protection.  - Thierry purulent secretions during intubation -- cultures negative.  - Pulmonology following.    3.  Recs  - Continue IV vancomycin + acyclovir + cefepime + Flagyl at this time.  - F/u WBC and fever curve.  - F/u pending cultures and serologies.  - Supportive care as per the primary team.  - Discussed plan of care with primary team attending, Dr. Alejandro Bob.  - Vent management as per pulmonology.  - GOC discussions ongoing. Hospice on consult.   - Discussed plan of care with nursing.  - Further recommendations pending clinical course.    Discussed case with ID attending/collaborating physician, Dr. Michael Camejo, who is in agreement with the above plan of care.      Please note that this report has been produced using speech recognition software and may contain errors related to that system including, but not limited to, errors in grammar, punctuation, and spelling, as well as words and phrases that possibly may  have been recognized inappropriately.  If there are any questions or concerns, contact the dictating provider for clarification.     The 21st Century Cures Act makes medical notes like these available to patients in the interest of transparency. Please be advised this is a medical document. Medical documents are intended to carry relevant information, facts as evident, and the clinical opinion of the practitioner. The medical note is intended as peer to peer communication and may appear blunt or direct. It is written in medical language and may contain abbreviations or verbiage that are unfamiliar.     If you have any questions or concerns please call [facility] Infectious Disease at [phone].     Aiden Osei, APRN    7/2/2024  7:50 AM

## 2024-07-03 NOTE — PROGRESS NOTES
[facility]  part of [facility] Infectious Disease Consult    Calos Hernandez Patient Status:  Inpatient    1980 MRN [id number]   Location [facility] 2W/SW Attending Alejandro Bob,    Hosp Day # 6 PCP No primary care provider on file.       Calos Hernandez seen and examined,   Afebrile,  Intubated now,   Not interactive,       History:  History reviewed. No pertinent past medical history.  History reviewed. No pertinent surgical history.  No family history on file.       Allergies:  Not on File    Medications:    Current Facility-Administered Medications:     phenylephrine (Christiano-Synephrine) 250 mg in sodium chloride 0.9% 250 mL infusion,  mcg/min, Intravenous, Continuous    sodium bicarbonate 150 mEq in dextrose 5% 1,000 mL infusion, 150 mEq, Intravenous, Continuous    sodium zirconium cyclosilicate (Lokelma) oral packet 10 g, 10 g, Per NG Tube, Q8H    aspirin chewable tab 324 mg, 324 mg, Per NG Tube, Daily    sevelamer carbonate (Renvela) tab 800 mg, 800 mg, Oral, TID CC    doxycycline hyclate (Vibramycin) 100 mg in sodium chloride 0.9% 100 mL IVPB, 100 mg, Intravenous, Q12H    dextrose 10% infusion (TPN no rate), , Intravenous, Continuous PRN    pancrelipase (Lip-Prot-Amyl) (Zenpep) DR particles cap 10,000 Units, 10,000 Units, Per G Tube, PRN **AND** sodium bicarbonate tab 325 mg, 325 mg, Oral, PRN    glucose (Dex4) 15 GM/59ML oral liquid 15 g, 15 g, Oral, Q15 Min PRN **OR** glucose (Glutose) 40% oral gel 15 g, 15 g, Oral, Q15 Min PRN **OR** glucose-vitamin C (Dex-4) chewable tab 4 tablet, 4 tablet, Oral, Q15 Min PRN **OR** dextrose 50% injection 50 mL, 50 mL, Intravenous, Q15 Min PRN **OR** glucose (Dex4) 15 GM/59ML oral liquid 30 g, 30 g, Oral, Q15 Min PRN **OR** glucose (Glutose) 40% oral gel 30 g, 30 g, Oral, Q15 Min PRN **OR** glucose-vitamin C (Dex-4) chewable tab 8 tablet, 8 tablet, Oral, Q15 Min PRN    vasopressin (Vasostrict) 20 Units in sodium chloride 0.9%  100 mL infusion for septic shock, 0.01-0.03 Units/min, Intravenous, Continuous    insulin aspart (NovoLOG) 100 Units/mL FlexPen 1-11 Units, 1-11 Units, Subcutaneous, Q6H    labetalol (Trandate) 5 mg/mL injection 10 mg, 10 mg, Intravenous, Q6H PRN    norepinephrine (Levophed) 4 mg/250mL infusion premix, 0.5-30 mcg/min, Intravenous, Continuous    metRONIDAZOLE in sodium chloride 0.79% (Flagyl) 5 mg/mL IVPB premix 500 mg, 500 mg, Intravenous, q6h    levETIRAcetam (Keppra) 500 mg/5mL injection 500 mg, 500 mg, Intravenous, Q12H    famotidine (Pepcid) 20 mg/2mL injection 20 mg, 20 mg, Intravenous, BID    fentaNYL (Sublimaze) 50 mcg/mL injection 25 mcg, 25 mcg, Intravenous, Q30 Min PRN **OR** fentaNYL (Sublimaze) 50 mcg/mL injection 50 mcg, 50 mcg, Intravenous, Q30 Min PRN    polyethylene glycol (PEG 3350) (Miralax) 17 g oral packet 17 g, 17 g, Oral, Daily PRN    senna (Senokot) 8.8 MG/5ML oral syrup 17.6 mg, 10 mL, Oral, Nightly PRN    bisacodyl (Dulcolax) 10 MG rectal suppository 10 mg, 10 mg, Rectal, Daily PRN    fleet enema (Fleet) 7-19 GM/118ML rectal enema 133 mL, 1 enema, Rectal, Once PRN    chlorhexidine gluconate (Peridex) 0.12 % oral solution 15 mL, 15 mL, Mouth/Throat, BID@0800,2000    propofol (Diprivan) 10 mg/mL infusion premix, 5-50 mcg/kg/min (Dosing Weight), Intravenous, Continuous    acetaminophen (Tylenol Extra Strength) tab 500 mg, 500 mg, Oral, Q4H PRN    ondansetron (Zofran) 4 MG/2ML injection 4 mg, 4 mg, Intravenous, Q6H PRN    prochlorperazine (Compazine) 10 MG/2ML injection 5 mg, 5 mg, Intravenous, Q8H PRN    acyclovir (Zovirax) 550 mg in sodium chloride 0.9% 100 mL IVPB, 10 mg/kg, Intravenous, Q8H    vancomycin (Vancocin) 1,000 mg in sodium chloride 0.9% 250 mL IVPB-ADDV, 1,000 mg, Intravenous, Q12H    LORazepam (Ativan) 2 mg/mL injection 2 mg, 2 mg, Intravenous, Q10 Min PRN    ceFEPIme (Maxpime) 2 g in sodium chloride 0.9% 100 mL IVPB-MBP, 2 g, Intravenous, Q8H    heparin (Porcine) 5000 UNIT/ML  injection 5,000 Units, 5,000 Units, Subcutaneous, q12h    acetaminophen (Ofirmev) 10 mg/mL infusion premix 1,000 mg, 1,000 mg, Intravenous, Q6H PRN    hydrALAzine (Apresoline) 20 mg/mL injection 10 mg, 10 mg, Intravenous, Q6H PRN    Review of Systems:   Constitutional: Negative for anorexia, chills, fatigue, fevers, malaise, night sweats and weight loss.  Eyes: Negative for visual disturbance, irritation and redness.  Ears, nose, mouth, throat, and face: Negative for hearing loss, tinnitus, nasal congestion, snoring, sore throat, hoarseness and voice change.  Respiratory: Negative for cough, sputum, hemoptysis, chest pain, wheezing, dyspnea on exertion, or stridor.  Cardiovascular: Negative for chest pain, palpitations, irregular heart beats, syncope, fatigue, orthopnea, paroxysmal nocturnal dyspnea, lower extremity edema.  Gastrointestinal: Negative for dysphagia, odynophagia, reflux symptoms, nausea, vomiting, change in bowel habits, diarrhea, constipation and abdominal pain.  Integument/breast: Negative for rash, skin lesions, and pruritus.  Hematologic/lymphatic: Negative for easy bruising, bleeding, and lymphadenopathy.  Musculoskeletal: Negative for myalgias, arthralgias, muscle weakness.  Neurological: Negative for headaches, dizziness, seizures, memory problems, trouble swallowing, speech problems, gait problems and weakness.  Behavioral/Psych: Negative for active tobacco use.  Endocrine: No history of of diabetes, thyroid disorder.  Unable to obtain,     Vital signs in last 24 hours:  Patient Vitals for the past 24 hrs:   BP Temp Temp src Pulse Resp SpO2 Height Weight   06/27/24 1000 (!) 164/103 -- -- (!) 121 (!) 31 100 % -- --   06/27/24 0900 (!) 151/122 -- -- 102 (!) 46 98 % -- --   06/27/24 0800 157/83 99.4 °F (37.4 °C) Temporal 100 25 97 % -- --   06/27/24 0704 -- -- -- -- -- -- 5' 7\" (1.702 m) 121 lb 4.1 oz (55 kg)   06/27/24 0700 (!) 163/88 -- -- 113 22 98 % -- --   06/27/24 0646 -- (!) 101.1 °F (38.4  °C) Temporal -- -- -- -- --   06/27/24 0600 (!) 168/87 -- -- 94 25 97 % -- --   06/27/24 0529 -- (!) 102.5 °F (39.2 °C) Rectal -- -- -- -- --   06/27/24 0500 (!) 155/97 -- -- (!) 124 (!) 28 97 % -- --   06/27/24 0445 (!) 157/95 -- -- 118 (!) 35 100 % -- --   06/27/24 0400 -- -- -- 81 (!) 35 100 % -- --   06/27/24 0230 (!) 165/97 -- -- 91 (!) 36 100 % -- --   06/27/24 0145 (!) 189/98 -- -- 115 25 96 % -- --   06/27/24 0122 -- 98.8 °F (37.1 °C) Temporal -- -- -- -- --   06/27/24 0042 -- -- -- -- -- -- -- 92 lb (41.7 kg)   06/27/24 0041 (!) 176/78 98.2 °F (36.8 °C) Temporal 99 18 99 % -- --       Physical Exam:   General: alert, cooperative, oriented.  No respiratory distress.   Head: Normocephalic, without obvious abnormality, atraumatic.   Eyes: Conjunctivae/corneas clear.  No scleral icterus.  No conjunctival     hemorrhage.   Nose: Nares normal.   Throat: Lips, mucosa, and tongue normal.  No thrush noted.   Neck: Soft, supple neck; trachea midline, no adenopathy, no thyromegaly.   Lungs: CTAB, normal and equal bilateral chest rise   Chest wall: No tenderness or deformity.   Heart: Regular rate and rhythm, normal S1S2, no murmur.   Abdomen: soft, non-tender, non-distended, no masses, no guarding, no     rebound, positive BS.   Extremity: no edema, no cyanosis   Skin: No rashes or lesions.   Neurological: Alert, interactive, no focal deficits    Labs:  Lab Results   Component Value Date    WBC 15.7 07/03/2024    HGB 9.5 07/03/2024    HCT 29.0 07/03/2024    .0 07/03/2024    CREATSERUM 2.24 07/03/2024    BUN 23 07/03/2024     07/03/2024    K 5.5 07/03/2024     07/03/2024    CO2 17.0 07/03/2024     07/03/2024    CA 7.4 07/03/2024    ALB 2.2 07/03/2024    ALKPHO 329 07/03/2024    BILT 1.0 07/03/2024    TP 5.8 07/03/2024     07/03/2024    ALT 39 07/03/2024    MG 1.8 07/03/2024    PHOS 6.1 07/03/2024       Radiology:  Reviewed,       Cultures:  Reviewed,     Assessment and Plan:    1.   Syndrome of Fever, headache and AMS:   - Complained of toothache at home, headaches for few days, had a Sz in the ER waiting room,   - On supplemental O2 and being monitored for airway protection,   - Differential includes meningoencephalitis with possible aspiration,   - CSF with , glucose 36 and Protein 321, follow Cul, MEP negative for HSV I & II.   - MRI of head reviewed, hyperintense abn in temporal lobe,  - Blood cul are NGTD  - RVP is negative,   - Strep Pneumo antigen in urine is negative,  - Crypto Ag is negative - has a bird at home,    - WNV negative, Toxo negative,   - Continue IV Vanc and Acyclcovir, IV Cefepime, Flagyl, d # 6, Doxy, d # 2,     2.   Acute hypoxic resp failure: Intubated for airway protection, fixed pupils, no gag reflex.  - Thierry purulent secretions during intubation, Trach aspirate Cul negative,     3.    Disposition: inhouse, a middle aged female admitted with headache and AMS with fever and a new Sz, Possible Meningoencephalitis, although HSV PCR negative in CSF, enhancement of temporal lobe, will continue acyclovir, Overall critically sick, discussed with family,   - Follow pending cul, serologies,   - Continue IV Vanc and Acyclovir, IV Cefepime with Flagyl,   - Supportive care,     Discussed with family multiple times in the past, all questions answered, further recommendations to follow, Thanks,  Thank you for consulting [facility] for Calos Hernandez.  If you have any questions or concerns please call [facility] and [facility] Infectious Disease at [phone].     Michael Camejo MD  6/27/2024  12:52 PM

## 2024-07-03 NOTE — PROGRESS NOTES
NEPHROLOGY DAILY PROGRESS NOTE         SUBJECTIVE:  Intubated, sedated       OBJECTIVE:    Total Intake/Output:    Intake/Output Summary (Last 24 hours) at 7/3/2024 1207  Last data filed at 7/3/2024 0614  Gross per 24 hour   Intake 7012.3 ml   Output 1450 ml   Net 5562.3 ml         PHYSICAL EXAM:  /58   Pulse 114   Temp 97.6 °F (36.4 °C) (Temporal)   Resp 17   Ht 5' 7\" (1.702 m)   Wt 120 lb 5.9 oz (54.6 kg)   LMP 06/27/2024   SpO2 96%   BMI 18.85 kg/m²   GEN: intubated, sedated   HEENT:  ETT in place   CHEST: ventilated  CARDIAC: S1S2 normal  ABD: soft, NT/ND  : joshua in place   EXT: trace lower ext edema  NEURO: sedated         CURRENT MEDICATIONS:  Current Facility-Administered Medications   Medication Dose Route Frequency    phenylephrine (Christiano-Synephrine) 250 mg in sodium chloride 0.9% 250 mL infusion   mcg/min Intravenous Continuous    sodium bicarbonate 150 mEq in dextrose 5% 1,000 mL infusion  150 mEq Intravenous Continuous    sodium zirconium cyclosilicate (Lokelma) oral packet 10 g  10 g Per NG Tube Q8H    aspirin chewable tab 324 mg  324 mg Per NG Tube Daily    doxycycline hyclate (Vibramycin) 100 mg in sodium chloride 0.9% 100 mL IVPB  100 mg Intravenous Q12H    dextrose 10% infusion (TPN no rate)   Intravenous Continuous PRN    pancrelipase (Lip-Prot-Amyl) (Zenpep) DR particles cap 10,000 Units  10,000 Units Per G Tube PRN    And    sodium bicarbonate tab 325 mg  325 mg Oral PRN    glucose (Dex4) 15 GM/59ML oral liquid 15 g  15 g Oral Q15 Min PRN    Or    glucose (Glutose) 40% oral gel 15 g  15 g Oral Q15 Min PRN    Or    glucose-vitamin C (Dex-4) chewable tab 4 tablet  4 tablet Oral Q15 Min PRN    Or    dextrose 50% injection 50 mL  50 mL Intravenous Q15 Min PRN    Or    glucose (Dex4) 15 GM/59ML oral liquid 30 g  30 g Oral Q15 Min PRN    Or    glucose (Glutose) 40% oral gel 30 g  30 g Oral Q15 Min PRN    Or    glucose-vitamin C (Dex-4) chewable tab 8 tablet  8 tablet Oral Q15 Min  PRN    vasopressin (Vasostrict) 20 Units in sodium chloride 0.9% 100 mL infusion for septic shock  0.01-0.03 Units/min Intravenous Continuous    insulin aspart (NovoLOG) 100 Units/mL FlexPen 1-11 Units  1-11 Units Subcutaneous Q6H    labetalol (Trandate) 5 mg/mL injection 10 mg  10 mg Intravenous Q6H PRN    norepinephrine (Levophed) 4 mg/250mL infusion premix  0.5-30 mcg/min Intravenous Continuous    metRONIDAZOLE in sodium chloride 0.79% (Flagyl) 5 mg/mL IVPB premix 500 mg  500 mg Intravenous q6h    levETIRAcetam (Keppra) 500 mg/5mL injection 500 mg  500 mg Intravenous Q12H    famotidine (Pepcid) 20 mg/2mL injection 20 mg  20 mg Intravenous BID    fentaNYL (Sublimaze) 50 mcg/mL injection 25 mcg  25 mcg Intravenous Q30 Min PRN    Or    fentaNYL (Sublimaze) 50 mcg/mL injection 50 mcg  50 mcg Intravenous Q30 Min PRN    polyethylene glycol (PEG 3350) (Miralax) 17 g oral packet 17 g  17 g Oral Daily PRN    senna (Senokot) 8.8 MG/5ML oral syrup 17.6 mg  10 mL Oral Nightly PRN    bisacodyl (Dulcolax) 10 MG rectal suppository 10 mg  10 mg Rectal Daily PRN    fleet enema (Fleet) 7-19 GM/118ML rectal enema 133 mL  1 enema Rectal Once PRN    chlorhexidine gluconate (Peridex) 0.12 % oral solution 15 mL  15 mL Mouth/Throat BID@0800,2000    propofol (Diprivan) 10 mg/mL infusion premix  5-50 mcg/kg/min (Dosing Weight) Intravenous Continuous    acetaminophen (Tylenol Extra Strength) tab 500 mg  500 mg Oral Q4H PRN    ondansetron (Zofran) 4 MG/2ML injection 4 mg  4 mg Intravenous Q6H PRN    prochlorperazine (Compazine) 10 MG/2ML injection 5 mg  5 mg Intravenous Q8H PRN    acyclovir (Zovirax) 550 mg in sodium chloride 0.9% 100 mL IVPB  10 mg/kg Intravenous Q8H    vancomycin (Vancocin) 1,000 mg in sodium chloride 0.9% 250 mL IVPB-ADDV  1,000 mg Intravenous Q12H    LORazepam (Ativan) 2 mg/mL injection 2 mg  2 mg Intravenous Q10 Min PRN    ceFEPIme (Maxpime) 2 g in sodium chloride 0.9% 100 mL IVPB-MBP  2 g Intravenous Q8H    heparin  (Porcine) 5000 UNIT/ML injection 5,000 Units  5,000 Units Subcutaneous q12h    acetaminophen (Ofirmev) 10 mg/mL infusion premix 1,000 mg  1,000 mg Intravenous Q6H PRN    hydrALAzine (Apresoline) 20 mg/mL injection 10 mg  10 mg Intravenous Q6H PRN           LABS:  Patient Labs Reviewed in Detail. Pertinent Labs as follows:  Recent Labs   Lab 07/03/24  0001 07/03/24  0438 07/03/24  0828   GLU 86 93 120*   BUN 18 20 21   CREATSERUM 1.65* 1.86* 1.95*   EGFRCR 39* 34* 32*   CA 7.7* 7.8* 7.3*    140 140   K 5.4* 6.0* 5.8*   * 118* 118*   CO2 15.0* 15.0* 16.0*     Recent Labs   Lab 07/01/24  0617 07/02/24  0630 07/03/24 0438   RBC 3.59* 3.76* 3.62*   HGB 9.4* 9.7* 9.5*   HCT 28.6* 30.2* 29.0*   MCV 79.7* 80.3 80.1   MCH 26.2 25.8* 26.2   MCHC 32.9 32.1 32.8   RDW 16.6* 17.6* 18.0*   NEPRELIM 3.83 8.10* 12.84*   WBC 5.4 11.3* 15.7*   .0 209.0 175.0         IMAGING:  XR ABDOMEN (1 VIEW) (CPT=74018)    Result Date: 7/3/2024  CONCLUSION:   There is a paucity of bowel gas.  No bowel dilatation is identified.  Weighted feeding tube terminates overlying the expected location of the distal stomach.    Dictated by (CST): Albert Sebastian MD on 7/03/2024 at 11:21 AM     Finalized by (CST): Albert Sebastian MD on 7/03/2024 at 11:22 AM          XR CHEST AP PORTABLE  (CPT=71045)    Result Date: 7/1/2024  CONCLUSION:   Diffuse bilateral interstitial opacity which could reflect edema or infection.  Hazy opacity in the right lower lung which may reflect a layering right pleural effusion.  Patchy right perihilar opacity which could reflect atelectasis with or without superimposed pneumonia.  Interval placement of a Dobbhoff tube which terminates in the proximal stomach.  The distal aspect of the tuft tube is mildly coiled in the proximal stomach with the tip in the region of the gastric fundus.  Left PICC line redemonstrated.  The tip of the PICC line is somewhat ill-defined but may be in the region of the upper right atrium.       Dictated by (CST): Phillip Omalley MD on 7/01/2024 at 4:01 PM     Finalized by (CST): Phillip Omalley MD on 7/01/2024 at 4:06 PM            ASSESSMENT AND PLAN:   This is 43 year old female with no sig PMH. Presented with headaches. Currently admitted for sepsis, encephalopathy and possible meningoencephalitis complicated by seizure.  Nephrology is consulted for hypernatremia.      Hypernatremia - improved  - Suspect rapid correction of Na due to polyuria   - Continue free water flushes 150ml/q4 hours - can titrate as needed  - Management of hyperglycemia per primary   - Monitor urine output      Non-oliguric NELIA  -Likely pre-renal/ATN in setting of hypoperfusion, concern for possible acyclovir induced nephropathy?  -Maintain renal perfusion pressures  -Check vancomycin trough level  -Monitor intake/output closely  -Avoid nephrotoxins and renally dose medications for creatinine clearance    Hyperkalemia  -Lokelma  -Bicarb gtt  -Nepro tube feeds    Metabolic acidosis  -Bicarb gtt    Hyperphosphatemia  -Renvela 800mg TID  -Nepro               Critical care time >35 mins    We will continue to follow DO Sanjana Lewisy - Nephrology

## 2024-07-03 NOTE — RESPIRATORY THERAPY NOTE
Patient's Problems: intubated with Meningoencephalitis     Respiratory Assessment :   Intubated patient is on ventilator/ full support.   Breath Sounds: were coarse and Rhonchi bilateral.   Secretions: Small  white and thick secretions.   Patient was suctioned as needed.       Ventilator Settings: AC/VC  RR 12 ,  , PEEP + 5, FIO2 40%      Airway: ETT 7.0  at 23 Lip         Plan of Care:  Patient is on 3 pressors by SIM [place]as[place]. Patient does not met Weaning criteria at this time, Patient needs to continue on ventilator, respiratory status stable and tolerating ventilator setting well. Patient was suction as need it, RT to continue to monitor this patient.

## 2024-07-03 NOTE — PROGRESS NOTES
[facility] Hospitalist Progress Note     CC: Hospital Follow up    PCP: No primary care provider on file.       Assessment/Plan:   Patient is a 43-year-old female with no known medical history, who presents to the hospital for evaluation of headaches and feeling ill.  Being encephalopathic with meningitis/encephalitis as the likely cause.  6/28/2024 patient was intubated for airway protection. 6/29/24 with fixed dilated pupils, no gag reflex.  Serial CTH and MRI with diffuse anoxic brain injury.       Sepsis  Encephalopathy  Meningitis/Encephalitis  -CT brain and MRI brain 6/29/2024 with diffuse loss of gray-white differentiate consistent with anoxic injury   -on cefepime, vancomycin, acyclovir, flagyl  -LP done on 6/27  -mannitol attempted but still had cerebral edema noted   -poor prognosis  -discussed in detail with mother at bedside on 7/1. Neurologic recovery unlikely   -on pressors. Would aim to not escalate therapy.   -no indication at this time to repeat EEG or perform MRI, appreciate neurology evaluation and discussion with family, I agree with her assessment and risks of MRI etc  -exam is consistent with brain death     Acute respiratory failure  -intubated 6/28/24  -critical care on consult     Acute kidney injury  Hypernatremia-resolved  Metabolic acidosis  -Cr rising, worsening renal function  -likely episodes of hypotension   -on pressors  -now on sodium bicarb drip  -Hyperkalemia noted, being treated with Lokelma  -would not pursue dialysis given prognosis     Seizure  -on keppra     Elevated liver enzymes  -alk phos high earlier, now coming down   -borderline ast/alt  -ammonia normal   -RUQ ultrasound noted   -unclear etiology, likely secondary to underlying infection     Fluids: now on sodium bicarb drip   Diet: tube feeds   Code status: DNR    I have spoken to Mother last on 7/1. They were definitely in shock of patient's condition. We addressed code status, initially they wanted to  change to full, but after further discussion on her findings, prognosis, and likely poor outcome, we decided to keep her DNR. Seemed they needed time to cope with this.   They understand that she is critical but as of yesterday wanted to confirm diagnosis etc. Appreciate neurology attending discussion with family. I agree with Dr. Uriostegui's Assessment.  I also have mentioned to mother that she has signs of brain death on exam.   Discussed to have whatever family they need at bedside, and we can re-assess situation day by day. Would recommend compassionate wean when family is ready.    Alejandro Morejon [facility] Hospitalist        Subjective:     On ventilator  Not breathing over vent  No gag reflex, no corneal, and has dilated pupils.   On pressors still  On bicarb drip  NELIA worsening    No family at bedside     OBJECTIVE:    Blood pressure 107/58, pulse 114, temperature 97.6 °F (36.4 °C), temperature source Temporal, resp. rate 17, height 5' 7\" (1.702 m), weight 120 lb 5.9 oz (54.6 kg), last menstrual period 06/27/2024, SpO2 96%.    Temp:  [96.3 °F (35.7 °C)-97.8 °F (36.6 °C)] 97.6 °F (36.4 °C)  Pulse:  [] 114  Resp:  [12-17] 17  BP: ()/(44-86) 107/58  SpO2:  [91 %-99 %] 96 %  FiO2 (%):  [40 %] 40 %      Intake/Output:    Intake/Output Summary (Last 24 hours) at 7/3/2024 1101  Last data filed at 7/3/2024 0614  Gross per 24 hour   Intake 7012.3 ml   Output 1450 ml   Net 5562.3 ml       Last 3 Weights   07/02/24 0500 120 lb 5.9 oz (54.6 kg)   07/01/24 0405 118 lb 2.7 oz (53.6 kg)   06/30/24 0522 118 lb 9.7 oz (53.8 kg)   06/29/24 0357 126 lb 12.2 oz (57.5 kg)   06/27/24 0704 121 lb 4.1 oz (55 kg)   06/27/24 0042 92 lb (41.7 kg)       /58   Pulse 114   Temp 97.6 °F (36.4 °C) (Temporal)   Resp 17   Ht 5' 7\" (1.702 m)   Wt 120 lb 5.9 oz (54.6 kg)   LMP 06/27/2024   SpO2 96%   BMI 18.85 kg/m²   General: intubated  Lungs: Mechanical breath sounds  Abdomen: soft  Extremities: No  edema  Neuro: Fixed dilated pupils, no gag reflex     Data Review:       Labs:     Recent Labs   Lab 07/01/24 0617 07/02/24 0630 07/03/24  0438   RBC 3.59* 3.76* 3.62*   HGB 9.4* 9.7* 9.5*   HCT 28.6* 30.2* 29.0*   MCV 79.7* 80.3 80.1   MCH 26.2 25.8* 26.2   MCHC 32.9 32.1 32.8   RDW 16.6* 17.6* 18.0*   NEPRELIM 3.83 8.10* 12.84*   WBC 5.4 11.3* 15.7*   .0 209.0 175.0         Recent Labs   Lab 07/03/24  0001 07/03/24 0438 07/03/24  0828   GLU 86 93 120*   BUN 18 20 21   CREATSERUM 1.65* 1.86* 1.95*   EGFRCR 39* 34* 32*   CA 7.7* 7.8* 7.3*    140 140   K 5.4* 6.0* 5.8*   * 118* 118*   CO2 15.0* 15.0* 16.0*       Recent Labs   Lab 06/29/24  0542 06/30/24 0434 07/01/24 0617 07/02/24 0630 07/03/24 0438   ALT 35 35 29 38 39   AST 64* 71* 78* 107* 114*   ALB 2.5* 2.7* 2.2* 2.3* 2.2*         Imaging:  XR CHEST AP PORTABLE  (CPT=71045)    Result Date: 7/1/2024  CONCLUSION:   Diffuse bilateral interstitial opacity which could reflect edema or infection.  Hazy opacity in the right lower lung which may reflect a layering right pleural effusion.  Patchy right perihilar opacity which could reflect atelectasis with or without superimposed pneumonia.  Interval placement of a Dobbhoff tube which terminates in the proximal stomach.  The distal aspect of the tuft tube is mildly coiled in the proximal stomach with the tip in the region of the gastric fundus.  Left PICC line redemonstrated.  The tip of the PICC line is somewhat ill-defined but may be in the region of the upper right atrium.      Dictated by (CST): Phillip Omalley MD on 7/01/2024 at 4:01 PM     Finalized by (CST): Phillip Omalley MD on 7/01/2024 at 4:06 PM             Meds:      sodium zirconium cyclosilicate  10 g Per NG Tube Q8H    aspirin  324 mg Per NG Tube Daily    doxycycline  100 mg Intravenous Q12H    insulin aspart  1-11 Units Subcutaneous Q6H    metRONIDAZOLE  500 mg Intravenous q6h    levETIRAcetam  500 mg Intravenous Q12H     famotidine  20 mg Intravenous BID    chlorhexidine gluconate  15 mL Mouth/Throat BID@0800,2000    acyclovir  10 mg/kg Intravenous Q8H    vancomycin  1,000 mg Intravenous Q12H    cefepime  2 g Intravenous Q8H    heparin  5,000 Units Subcutaneous q12h      phenylephrine 320 mcg/min (07/03/24 0126)    sodium bicarbonate in D5W infusion 150 mEq (07/03/24 0647)    dextrose 10% 1,000 mL (07/03/24 0551)    vasopressin (Vasostrict) 20 Units in sodium chloride 0.9% 100 mL infusion for septic shock 0.03 Units/min (07/03/24 0424)    norepinephrine 8 mcg/min (07/03/24 1045)    propofol Stopped (07/02/24 0000)       dextrose 10%    lipase-protease-amylase (Lip-Prot-Amyl) **AND** sodium bicarbonate    glucose **OR** glucose **OR** glucose-vitamin C **OR** dextrose **OR** glucose **OR** glucose **OR** glucose-vitamin C    labetalol    fentaNYL **OR** fentaNYL    polyethylene glycol (PEG 3350)    senna    bisacodyl    fleet enema    acetaminophen    ondansetron    prochlorperazine    LORazepam    acetaminophen    hydrALAzine

## 2024-07-03 NOTE — PROGRESS NOTES
Calos Hernandez Patient Status:  Inpatient    1980 MRN [id number]   Location [facility] 2W/SW Attending Alejandro Bob DO   Hosp Day # 6 PCP No primary care provider on file.     Critical Care Progress Note      Assessment/Plan:  Acute respiratory failure - intubated for airway protection  - continue full vent support  - daily SBT per protocol  - minimize sedation as able  Septic shock  - vasopressors as needed, currently off  - anti-infectives per ID service  Meningoencephalitis with anoxic brain injury on MRI - EEG with no brain activity and loss of brainstem reflexes, neurologic recovery is dismal  - anti-infectives per ID service  - neurology is following  Hypernatremia from rapid correction  - Resolved after FWF; water deficit corrected  - Further per nephrology  FEN  - TFs  Ppx  - subcu heparin  - famotidine  Dispo  - DNAR/select  - poor prognosis discussed with family      Critical Care Time greater than: 45 minutes    Mauro Gomez DO  [facility]  Pulmonary & Critical Care Medicine      Subjective:  Overbreathing ventilator, otherwise no clinical improvement    Objective:    Medications:   sodium zirconium cyclosilicate  10 g Per NG Tube Q8H    doxycycline  100 mg Intravenous Q12H    insulin aspart  1-11 Units Subcutaneous Q6H    metRONIDAZOLE  500 mg Intravenous q6h    levETIRAcetam  500 mg Intravenous Q12H    aspirin  300 mg Rectal Daily    famotidine  20 mg Intravenous BID    chlorhexidine gluconate  15 mL Mouth/Throat BID@0800,2000    acyclovir  10 mg/kg Intravenous Q8H    vancomycin  1,000 mg Intravenous Q12H    cefepime  2 g Intravenous Q8H    heparin  5,000 Units Subcutaneous q12h       Vent Mode: VC/AC  FiO2 (%):  [40 %] 40 %  S RR:  [12] 12  S VT:  [500 mL] 500 mL  PEEP/CPAP (cm H2O):  [5 cm H20] 5 cm H20  MAP (cm H2O):  [8-10] 10    Intake/Output Summary (Last 24 hours) at 7/3/2024 0722  Last data filed at 7/3/2024 0614  Gross per 24 hour   Intake 7012.3 ml   Output 1450  ml   Net 5562.3 ml       BP 96/58 (BP Location: Right arm)   Pulse 111   Temp 97.8 °F (36.6 °C) (Temporal)   Resp 12   Ht 5' 7\" (1.702 m)   Wt 120 lb 5.9 oz (54.6 kg)   LMP 06/27/2024   SpO2 95%   BMI 18.85 kg/m²   Physical Exam:  General: intubated  Skin: no rash, ulcers or subcutaneous nodules  Eyes: anicteric sclerae, moist conjunctivae  Head, ears, nose, throat: atraumatic, oropharynx clear with moist mucous membranes  Neck: trachea midline with no thyromegaly  Heart: regular rate and rhythm, no murmurs / rubs / gallops  Lungs: clear bilaterally  Abdomen: soft, nontender, nondistended   Extremities: no edema or cyanosis  Psych: unresponsive  Neuro: no corneal, no pupillary reflex, no cough/gag, overbreathing ventilator    Recent Labs   Lab 07/03/24  0438   RBC 3.62*   HGB 9.5*   HCT 29.0*   MCV 80.1   MCH 26.2   MCHC 32.8   RDW 18.0*   NEPRELIM 12.84*   WBC 15.7*   .0     Recent Labs   Lab 07/01/24  0617 07/01/24  0814 07/02/24  0630 07/02/24  1610 07/03/24  0001 07/03/24  0438   GLU 92   < > 88  85 73 86 93   BUN 6*   < > 11  11 14 18 20   CREATSERUM 0.74   < > 1.07*  1.10* 1.29* 1.65* 1.86*   CA 7.1*   < > 7.7*  7.3* 6.4* 7.7* 7.8*   ALB 2.2*  --  2.3*  --   --  2.2*   *   < > 149*  149* 147* 144 140   K 3.6  3.6   < > 4.9  4.9 4.4 5.4* 6.0*   *   < > 122*  123* 126* 121* 118*   CO2 20.0*   < > 18.0*  18.0* 15.0* 15.0* 15.0*   ALKPHO 230*  --  292*  --   --  329*   AST 78*  --  107*  --   --  114*   ALT 29  --  38  --   --  39   BILT 0.6  --  0.8  --   --  1.0   TP 5.9  --  6.0  --   --  5.8    < > = values in this interval not displayed.     Recent Labs   Lab 07/01/24  1526   ABGPHT 7.29*   HFAYFJ3B 40   WGMEI1L 72*   ABGHCO3 19.5*   SITE Right Radial     No results for input(s): \"BNP\" in the last 168 hours.  No results for input(s): \"TROP\", \"CK\" in the last 168 hours.    Imaging: I independently visualized all relevant chest imaging in PACS, agree with radiology  interpretation except where noted.

## 2024-07-03 NOTE — PLAN OF CARE
Patient intubated, unresponsive. Only withdraws pain from bilateral lower extremities.  No cough, no gag, no corneal reflex. Pupils fixed and dilated.  Tube feedings continue to be held, Patient remains on Christiano, Vaso, and Levo, titrated per admin instructions.   Bicarb gtt infusing per order.  R NGT in place.  C[place] in place, rectal tube in place.  Patient repositioned. Heels elevated. Bed in lowest position.  Safety and comfort maintained.  Family was at bedside.     Problem: PAIN - ADULT  Goal: Verbalizes/displays adequate comfort level or patient's stated pain goal  Description: INTERVENTIONS:  - Encourage pt to monitor pain and request assistance  - Assess pain using appropriate pain scale  - Administer analgesics based on type and severity of pain and evaluate response  - Implement non-pharmacological measures as appropriate and evaluate response  - Consider cultural and social influences on pain and pain management  - Manage/alleviate anxiety  - Utilize distraction and/or relaxation techniques  - Monitor for opioid side effects  - Notify MD/LIP if interventions unsuccessful or patient reports new pain  - Anticipate increased pain with activity and pre-medicate as appropriate  Outcome: Progressing     Problem: SAFETY ADULT - FALL  Goal: Free from fall injury  Description: INTERVENTIONS:  - Assess pt frequently for physical needs  - Identify cognitive and physical deficits and behaviors that affect risk of falls.  - Andrew fall precautions as indicated by assessment.  - Educate pt/family on patient safety including physical limitations  - Instruct pt to call for assistance with activity based on assessment  - Modify environment to reduce risk of injury  - Provide assistive devices as appropriate  - Consider OT/PT consult to assist with strengthening/mobility  - Encourage toileting schedule  Outcome: Progressing     Problem: RESPIRATORY - ADULT  Goal: Achieves optimal ventilation and oxygenation  Description:  INTERVENTIONS:  - Assess for changes in respiratory status  - Assess for changes in mentation and behavior  - Position to facilitate oxygenation and minimize respiratory effort  - Oxygen supplementation based on oxygen saturation or ABGs  - Provide Smoking Cessation handout, if applicable  - Encourage broncho-pulmonary hygiene including cough, deep breathe, Incentive Spirometry  - Assess the need for suctioning and perform as needed  - Assess and instruct to report SOB or any respiratory difficulty  - Respiratory Therapy support as indicated  - Manage/alleviate anxiety  - Monitor for signs/symptoms of CO2 retention  Outcome: Progressing     Problem: CARDIOVASCULAR - ADULT  Goal: Maintains optimal cardiac output and hemodynamic stability  Description: INTERVENTIONS:  - Monitor vital signs, rhythm, and trends  - Monitor for bleeding, hypotension and signs of decreased cardiac output  - Evaluate effectiveness of vasoactive medications to optimize hemodynamic stability  - Monitor arterial and/or venous puncture sites for bleeding and/or hematoma  - Assess quality of pulses, skin color and temperature  - Assess for signs of decreased coronary artery perfusion - ex. Angina  - Evaluate fluid balance, assess for edema, trend weights  Outcome: Not Progressing     Problem: NEUROLOGICAL - ADULT  Goal: Achieves stable or improved neurological status  Description: INTERVENTIONS  - Assess for and report changes in neurological status  - Initiate measures to prevent increased intracranial pressure  - Maintain blood pressure and fluid volume within ordered parameters to optimize cerebral perfusion and minimize risk of hemorrhage  - Monitor temperature, glucose, and sodium. Initiate appropriate interventions as ordered  Outcome: Not Progressing

## 2024-07-03 NOTE — PLAN OF CARE
Received pt intubated, no sedation running. Pt unresponsive, does not follow commands, only withdraws from pain in GUERITA lower extremities. No brainstem reflexes, (-) cough, gag, corneals. Pupils fixed and dilated.     Blood pressure labile overnight, added Levophed for sustained hypotension. Levo currently running at 8mg. Christiano and vaso gtts continued. 1L bolus given.     Pt had several large watery/loose bowel movements at start of shift, Flexiseal placed. C. Difficile sample collected and sent, results pending.     Elevated potassium this morning, Lokelma ordered. Bicarb gtt ordered and currently infusing.     Bed locked in lowest position, safety maintained.     Problem: NEUROLOGICAL - ADULT  Goal: Achieves stable or improved neurological status  Description: INTERVENTIONS  - Assess for and report changes in neurological status  - Initiate measures to prevent increased intracranial pressure  - Maintain blood pressure and fluid volume within ordered parameters to optimize cerebral perfusion and minimize risk of hemorrhage  - Monitor temperature, glucose, and sodium. Initiate appropriate interventions as ordered  Outcome: Not Progressing  Goal: Absence of seizures  Description: INTERVENTIONS  - Monitor for seizure activity  - Administer anti-seizure medications as ordered  - Monitor neurological status  Outcome: Progressing     Problem: RESPIRATORY - ADULT  Goal: Achieves optimal ventilation and oxygenation  Description: INTERVENTIONS:  - Assess for changes in respiratory status  - Assess for changes in mentation and behavior  - Position to facilitate oxygenation and minimize respiratory effort  - Oxygen supplementation based on oxygen saturation or ABGs  - Provide Smoking Cessation handout, if applicable  - Encourage broncho-pulmonary hygiene including cough, deep breathe, Incentive Spirometry  - Assess the need for suctioning and perform as needed  - Assess and instruct to report SOB or any respiratory difficulty  -  Respiratory Therapy support as indicated  - Manage/alleviate anxiety  - Monitor for signs/symptoms of CO2 retention  Outcome: Progressing     Problem: CARDIOVASCULAR - ADULT  Goal: Maintains optimal cardiac output and hemodynamic stability  Description: INTERVENTIONS:  - Monitor vital signs, rhythm, and trends  - Monitor for bleeding, hypotension and signs of decreased cardiac output  - Evaluate effectiveness of vasoactive medications to optimize hemodynamic stability  - Monitor arterial and/or venous puncture sites for bleeding and/or hematoma  - Assess quality of pulses, skin color and temperature  - Assess for signs of decreased coronary artery perfusion - ex. Angina  - Evaluate fluid balance, assess for edema, trend weights  Outcome: Not Progressing

## 2024-07-03 NOTE — DIETARY NOTE
NUTRITION NOTE:          7/3 Update: Tube feeding held ~ 5:30 am d/t distended/firm abdomen and ?tf coming from mouth per RN. Abdominal xray ordered-negative. Pt is however on X 3 pressors and discussed at rounds unsafe for enteral nutrition at this time.  Noted Hyperkalemia- When/if safe to resume  T will utilize  low k+ Nepro.  IVF change noted. Levophed now @ 10 mcg/min, phenylephrine @ 320 mcg/min and vasopressin @ 0.03 Units/min. Hypernatremia resolved.     7/1/2024 Update: Consult to initiate tube feeds. Pt is at high risk for refeeding syndrome d/t severe malnutrition- tube feeds to start at low rate and advance slower towards goal. FWF increased to address hypernatremia as recommended by renal service. IVF: D5W @ 75 ml/hr.   See orders below under nutrition intervention     6/30/2024 Update:    EN initiation deferred, pt metabolically unstable, hence not safe to commence EN. And, currently no tube access to utilize   RN reports waiting for family to arrive, with plan to discuss Goals of care.  Nutrition support in future pending outcome of goal of care and decisions.     6/29/2024:  Received MD consult to initiate/manage EN (Enteral Nutrition)   No tube access yet for EN to initiate. Intubate 6/28. Sedated on propofol. On Levophed. Infusing IV NS @ 83ml/hr.   Will check in tomorrow for access and EN start.     NUTRITION INTERVENTION:      NUTRITION PRESCRIPTION:   Estimated Nutrition needs: --dosing wt of 55 kg - wt taken on 6/28  Calories: 0251-0814 calories/day (28-34 calories per kg Dosing wt)  [place]: 1565 kcal/day  Protein: 66-77 g protein/day (1.2-1.4 g protein/kg Dosing wt)  Fluid Needs: ~1ml/kcal      - Tube feeding rx: if can resume: Nepro 25 ml/hr; advance 10 ml daily to goal 40 ml/hr X ave 22 hr infusion time. Rx= 1584 kcals, 71 g protein, 642 ml h20, FWF: 100 ml q 4 hrs (600 ml), total h20: 1242 ml, 93% RDI's, 100% jer and protein goal.     PLAN OF CARE:     RD will continue to follow up.  **If cannot resume tube feeds, eval for temporary parenteral nutrition as bridge back to enteral (PICC triple lumen in place) vs family wishes/GOC and no alternate nutrition support if no aggressive or further escalation of  care. .     Jasmine Arana RD, LDN, [facility] ([id number])

## 2024-07-04 NOTE — RESPIRATORY THERAPY NOTE
Patient remains on full vent support with the following settings AC 12/500/40%/+5. Bilateral bs heard on auscultation. Suction provided as needed. Patient maintaining sats above 92%. Patient not appropriate for SBT.

## 2024-07-04 NOTE — PROGRESS NOTES
[facility] Pharmacy Dosing Service      Follow Up Pharmacokinetic Consult for Vancomycin Dosing     Calos Hernandez is a 43 year old female who is receiving vancomycin therapy for CNS infection. Patient is on day 8 of vancomycin and is currently receiving 1000 mg IV every 12 hours. The current treatment and monitoring approach is steady state AUC strategy.        Weight and Temperature:    Wt Readings from Last 1 Encounters:   24 54.6 kg (120 lb 5.9 oz)         Temp Readings from Last 1 Encounters:   24 98 °F (36.7 °C) (Temporal)      Labs:   Recent Labs   Lab 24  0108 24  0944 24  1527   CREATSERUM 2.53* 2.63* 2.41*      Estimated Creatinine Clearance: 25.4 mL/min (A) (based on SCr of 2.41 mg/dL (H)).     Recent Labs   Lab 24  0617 24  0630 24  0438   WBC 5.4 11.3* 15.7*        Vancomycin Levels:  Lab Results   Component Value Date/Time    VANCT 44.2 (H) 2024 03:27 PM    VANCT 15.5 2024 04:34 AM       Corresponding 24 h-AUC: N/A     The Pharmacokinetic Target is:    Trough/random 10-15 mg/L    Renal Dosing Considerations:    NELIA/ARF     Assessment/Plan:   Maintenance Regimen: Adjust vancomycin to per levels.      Monitorin) Plan for vancomycin random level to be obtained  in 48-72 hours based on renal function.    2) Pharmacy will order SCr as clinically indicated to assess renal function.    3) Pharmacy will monitor for toxicity and efficacy, adjust vancomycin dose and/or frequency, and order vancomycin levels as appropriate per the Pharmacy and Therapeutics Committee approved protocol until discontinuation of the medication.       We appreciate the opportunity to assist in the care of this patient.     Regina Emerson, PharmD  2024  4:37 PM  [facility] Pharmacy Extension: [phone]

## 2024-07-04 NOTE — PROGRESS NOTES
[facility] PULMONARY/CRITICAL CARE    S: No new events overnight.    Meds:   [START ON 7/5/2024] acyclovir  10 mg/kg Intravenous Q24H    [START ON 7/5/2024] cefepime  2 g Intravenous Q24H    famotidine  20 mg Intravenous Daily    vancomycin  125 mg Per NG Tube Daily    sodium zirconium cyclosilicate  10 g Per NG Tube Q8H    aspirin  324 mg Per NG Tube Daily    sevelamer carbonate  800 mg Oral TID CC    doxycycline  100 mg Intravenous Q12H    insulin aspart  1-11 Units Subcutaneous Q6H    metRONIDAZOLE  500 mg Intravenous q6h    levETIRAcetam  500 mg Intravenous Q12H    chlorhexidine gluconate  15 mL Mouth/Throat BID@0800,2000    [Held by provider] vancomycin  1,000 mg Intravenous Q12H    heparin  5,000 Units Subcutaneous q12h       Prn Meds:    dextrose 10%    lipase-protease-amylase (Lip-Prot-Amyl) **AND** sodium bicarbonate    glucose **OR** glucose **OR** glucose-vitamin C **OR** dextrose **OR** glucose **OR** glucose **OR** glucose-vitamin C    labetalol    fentaNYL **OR** fentaNYL    polyethylene glycol (PEG 3350)    senna    bisacodyl    fleet enema    acetaminophen    ondansetron    prochlorperazine    LORazepam    acetaminophen    hydrALAzine    Infusions:   phenylephrine 320 mcg/min (07/04/24 0700)    sodium bicarbonate in D5W infusion 100 mL/hr at 07/04/24 0700    dextrose 10% 30 mL/hr at 07/04/24 0700    vasopressin (Vasostrict) 20 Units in sodium chloride 0.9% 100 mL infusion for septic shock 0.03 Units/min (07/04/24 0700)    norepinephrine 15 mcg/min (07/04/24 1001)    propofol Stopped (07/02/24 0000)       OBJECTIVE:  Vitals:    07/04/24 0700 07/04/24 0800 07/04/24 0900 07/04/24 1000   BP: (!) 83/42 95/46 (!) 87/46 (!) 81/46   Pulse: 116 118 119 119   Resp: 12 12 12 12   Temp:  98.4 °F (36.9 °C)     TempSrc:  Temporal     SpO2: 95% 95% 95% 94%   Weight:       Height:         Oxygen Therapy  SpO2: 94 %  O2 Device: Ventilator  FiO2 (%): 35 %  O2 Flow Rate (L/min): 5 L/min  Pulse Oximetry Type:  Continuous  Ventilator Settings: AC 12// FIO2 35/PEEP 5    FiO2 (%): 35 %      I/O last 3 completed shifts:  In: 7424.6 [I.V.:4610.2; Other:20; NG/GT:1284; IV PIGGYBACK:1510.4]  Out: 2125 [Urine:575; Stool:1550]  I/O this shift:  In: 1087 [I.V.:1087]  Out: 550 [Urine:150; Stool:400]    Lungs: clear to auscultation bilaterally  Heart: regular rate and rhythm  Abdomen: soft, non-tender; bowel sounds normal; no masses,  no organomegaly  Extremities: extremities normal, atraumatic, no cyanosis or edema    Labs:  Recent Labs   Lab 07/01/24  0617 07/02/24 0630 07/03/24  0438   RBC 3.59* 3.76* 3.62*   HGB 9.4* 9.7* 9.5*   HCT 28.6* 30.2* 29.0*   MCV 79.7* 80.3 80.1   MCH 26.2 25.8* 26.2   MCHC 32.9 32.1 32.8   RDW 16.6* 17.6* 18.0*   NEPRELIM 3.83 8.10* 12.84*   WBC 5.4 11.3* 15.7*   .0 209.0 175.0     Recent Labs   Lab 07/01/24  0617 07/01/24  0814 07/02/24  0630 07/02/24  1610 07/03/24  0438 07/03/24  0828 07/03/24  1743 07/04/24  0108   GLU 92   < > 88  85   < > 93 120* 122* 106*   BUN 6*   < > 11  11   < > 20 21 23 27*   CREATSERUM 0.74   < > 1.07*  1.10*   < > 1.86* 1.95* 2.24* 2.53*   CA 7.1*   < > 7.7*  7.3*   < > 7.8* 7.3* 7.4* 7.0*   ALB 2.2*  --  2.3*  --  2.2*  --   --   --    *   < > 149*  149*   < > 140 140 135* 134*   K 3.6  3.6   < > 4.9  4.9   < > 6.0* 5.8* 5.5* 5.9*   *   < > 122*  123*   < > 118* 118* 112 111   CO2 20.0*   < > 18.0*  18.0*   < > 15.0* 16.0* 17.0* 18.0*   ALKPHO 230*  --  292*  --  329*  --   --   --    AST 78*  --  107*  --  114*  --   --   --    ALT 29  --  38  --  39  --   --   --    BILT 0.6  --  0.8  --  1.0  --   --   --    TP 5.9  --  6.0  --  5.8  --   --   --     < > = values in this interval not displayed.     Recent Labs   Lab 06/28/24  1805   PCT 0.68*     No results for input(s): \"CRP\", \"DDIMER\", \"LDH\", \"DIEGO\", \"CK\" in the last 72 hours.  Lab Results   Component Value Date    COVID19 Not Detected 06/29/2024     Recent Labs     07/01/24  1529    ABGPHT 7.29*   EUTWXE0O 40   IDKMD9M 72*   ABGHCO3 19.5*   FIO2 40.00     No results for input(s): \"LACTIART\", \"LACTI\" in the last 72 hours.    Lab Results   Component Value Date    INR 1.01 06/27/2024     Lab Results   Component Value Date    PGLU 117 07/04/2024     No results for input(s): \"TROP\", \"CK\" in the last 168 hours.  Assessment and Plan    Acute respiratory failure - intubated for airway protection  - continue full vent support  - daily SBT per protocol, but patient without spon resp.  - minimize sedation as able  Septic vs neurogenic shock  - vasopressors as needed, currently on vaso, levo and jessica.  - anti-infectives per ID service  - will start stress dose steroids.  - appears euvolemic.  Meningoencephalitis with anoxic brain injury on MRI - EEG with no brain activity and loss of brainstem reflexes, neurologic recovery is dismal  - anti-infectives per ID service  - neurology is following  Hypernatremia from rapid correction  - Resolved after FWF; water deficit corrected  - Further per nephrology  FEN  - TFs  Ppx  - subcu heparin  - famotidine  Dispo  - DNAR/select  - very poor prognosis      Critical Care Time greater than: 35 minutes    Adriel Merrill M.D.  Pulmonary/Critical Care and Sleep Medicine

## 2024-07-04 NOTE — PROGRESS NOTES
[facility], [place]  part of [facility] Infectious Disease Progress Note    Calos Hernandez Patient Status:  Inpatient    1980 MRN [id number]   Location [facility] 2W/SW Attending Alejandro Bob,    Hosp Day # 7 PCP No primary care provider on file.     Subjective:  Chart reviewed, no acute events.  Pt remains intubated on full support. Not responsive.  Afebrile      Objective:    Allergies:  Not on File    Medications:    Current Facility-Administered Medications:     [START ON 2024] acyclovir (Zovirax) 550 mg in sodium chloride 0.9% 100 mL IVPB, 10 mg/kg, Intravenous, Q24H    [START ON 2024] ceFEPIme (Maxpime) 2 g in sodium chloride 0.9% 100 mL IVPB-MBP, 2 g, Intravenous, Q24H    famotidine (Pepcid) 20 mg/2mL injection 20 mg, 20 mg, Intravenous, Daily    vancomycin (Firvanq) 50 mg/mL oral solution 125 mg, 125 mg, Per NG Tube, Daily    phenylephrine (Christiano-Synephrine) 250 mg in sodium chloride 0.9% 250 mL infusion,  mcg/min, Intravenous, Continuous    sodium bicarbonate 150 mEq in dextrose 5% 1,000 mL infusion, 150 mEq, Intravenous, Continuous    sodium zirconium cyclosilicate (Lokelma) oral packet 10 g, 10 g, Per NG Tube, Q8H    aspirin chewable tab 324 mg, 324 mg, Per NG Tube, Daily    sevelamer carbonate (Renvela) tab 800 mg, 800 mg, Oral, TID CC    doxycycline hyclate (Vibramycin) 100 mg in sodium chloride 0.9% 100 mL IVPB, 100 mg, Intravenous, Q12H    dextrose 10% infusion (TPN no rate), , Intravenous, Continuous PRN    pancrelipase (Lip-Prot-Amyl) (Zenpep) DR particles cap 10,000 Units, 10,000 Units, Per G Tube, PRN **AND** sodium bicarbonate tab 325 mg, 325 mg, Oral, PRN    glucose (Dex4) 15 GM/59ML oral liquid 15 g, 15 g, Oral, Q15 Min PRN **OR** glucose (Glutose) 40% oral gel 15 g, 15 g, Oral, Q15 Min PRN **OR** glucose-vitamin C (Dex-4) chewable tab 4 tablet, 4 tablet, Oral, Q15 Min PRN **OR** dextrose 50% injection 50 mL, 50 mL, Intravenous, Q15 Min PRN  **OR** glucose (Dex4) 15 GM/59ML oral liquid 30 g, 30 g, Oral, Q15 Min PRN **OR** glucose (Glutose) 40% oral gel 30 g, 30 g, Oral, Q15 Min PRN **OR** glucose-vitamin C (Dex-4) chewable tab 8 tablet, 8 tablet, Oral, Q15 Min PRN    vasopressin (Vasostrict) 20 Units in sodium chloride 0.9% 100 mL infusion for septic shock, 0.01-0.03 Units/min, Intravenous, Continuous    insulin aspart (NovoLOG) 100 Units/mL FlexPen 1-11 Units, 1-11 Units, Subcutaneous, Q6H    labetalol (Trandate) 5 mg/mL injection 10 mg, 10 mg, Intravenous, Q6H PRN    norepinephrine (Levophed) 4 mg/250mL infusion premix, 0.5-30 mcg/min, Intravenous, Continuous    metRONIDAZOLE in sodium chloride 0.79% (Flagyl) 5 mg/mL IVPB premix 500 mg, 500 mg, Intravenous, q6h    levETIRAcetam (Keppra) 500 mg/5mL injection 500 mg, 500 mg, Intravenous, Q12H    fentaNYL (Sublimaze) 50 mcg/mL injection 25 mcg, 25 mcg, Intravenous, Q30 Min PRN **OR** fentaNYL (Sublimaze) 50 mcg/mL injection 50 mcg, 50 mcg, Intravenous, Q30 Min PRN    polyethylene glycol (PEG 3350) (Miralax) 17 g oral packet 17 g, 17 g, Oral, Daily PRN    senna (Senokot) 8.8 MG/5ML oral syrup 17.6 mg, 10 mL, Oral, Nightly PRN    bisacodyl (Dulcolax) 10 MG rectal suppository 10 mg, 10 mg, Rectal, Daily PRN    fleet enema (Fleet) 7-19 GM/118ML rectal enema 133 mL, 1 enema, Rectal, Once PRN    chlorhexidine gluconate (Peridex) 0.12 % oral solution 15 mL, 15 mL, Mouth/Throat, BID@0800,2000    propofol (Diprivan) 10 mg/mL infusion premix, 5-50 mcg/kg/min (Dosing Weight), Intravenous, Continuous    acetaminophen (Tylenol Extra Strength) tab 500 mg, 500 mg, Oral, Q4H PRN    ondansetron (Zofran) 4 MG/2ML injection 4 mg, 4 mg, Intravenous, Q6H PRN    prochlorperazine (Compazine) 10 MG/2ML injection 5 mg, 5 mg, Intravenous, Q8H PRN    [Held by provider] vancomycin (Vancocin) 1,000 mg in sodium chloride 0.9% 250 mL IVPB-ADDV, 1,000 mg, Intravenous, Q12H    LORazepam (Ativan) 2 mg/mL injection 2 mg, 2 mg,  Intravenous, Q10 Min PRN    heparin (Porcine) 5000 UNIT/ML injection 5,000 Units, 5,000 Units, Subcutaneous, q12h    acetaminophen (Ofirmev) 10 mg/mL infusion premix 1,000 mg, 1,000 mg, Intravenous, Q6H PRN    hydrALAzine (Apresoline) 20 mg/mL injection 10 mg, 10 mg, Intravenous, Q6H PRN    Physical Exam:  General:  No apparent distress.  Vital Signs:  Blood pressure (!) 87/46, pulse 119, temperature 98.4 °F (36.9 °C), temperature source Temporal, resp. rate 12, height 5' 7\" (1.702 m), weight 120 lb 5.9 oz (54.6 kg), last menstrual period 2024, SpO2 95%.   Temp (24hrs), Av °F (36.7 °C), Min:97.5 °F (36.4 °C), Max:98.4 °F (36.9 °C)      HEENT: ETT in place, pupils fixed   Lungs: intubated   Cardiac: Regular rate   Abdomen:  Soft, non-distended, non-tender, with no rebound or guarding.  Extremities:  No lower extremity edema noted.  Without clubbing or cyanosis.    Skin: Normal texture and turgor.  Neurologic: as above    Labs:  Lab Results   Component Value Date    CREATSERUM 2.53 2024    BUN 27 2024     2024    K 5.9 2024     2024    CO2 18.0 2024     2024    CA 7.0 2024       Radiology:  KUB yesterday:  CONCLUSION:      There is a paucity of bowel gas.  No bowel dilatation is identified.      Weighted feeding tube terminates overlying the expected location of the distal stomach.     Assessment/Plan:    1.  Sepsis with concern for meningoencephalitis  -came to ER with headache and not feeling well,  seizure in waiting room  -s/p LP on , , glucose 36 and protein 321. Cultures negative  -intubated for airway protection on 24  -CT and MRI brain on  with anoxic injury  -MEP negative for HSV   -blood cultures NGTD  -RVP neg, strep pneumo ag negative, crypto ag negative  -WNV and Toxoplasmosis negative  -tracheal aspirate with normal nandini  -on IV Cefepime, Vancomycin, Acyclovir and Flagyl, day#7, Doxycycline day #3    DISPO:   Continue IV Cefepime, Acyclovir, Flagyl and Doxycycline.  IV Vancomycin on hold.  Continue PO Vanco ppx.  Will follow with further recs.     If you have any questions or concerns please call [facility] at [phone].     TRUPTI Dumont  7/4/2024  8:52 AM

## 2024-07-04 NOTE — PROGRESS NOTES
[facility] Hospitalist Progress Note     CC: Hospital Follow up    PCP: No primary care provider on file.       Assessment/Plan:   Patient is a 43-year-old female with no known medical history, who presents to the hospital for evaluation of headaches and feeling ill.  Being encephalopathic with meningitis/encephalitis as the likely cause.  6/28/2024 patient was intubated for airway protection. 6/29/24 with fixed dilated pupils, no gag reflex.  Serial CTH and MRI with diffuse anoxic brain injury.       Sepsis  Encephalopathy  Meningitis/Encephalitis  -CT brain and MRI brain 6/29/2024 with diffuse loss of gray-white differentiate consistent with anoxic injury   -on cefepime, vancomycin, acyclovir, flagyl  -LP done on 6/27  -mannitol attempted but still had cerebral edema noted   -poor prognosis  -discussed in detail with mother at bedside on 7/1. Neurologic recovery unlikely   -on pressors. Would aim to not escalate therapy.   -no indication at this time to repeat EEG or perform MRI, appreciate neurology evaluation and discussion with family, I agree with her assessment and risks of MRI etc  -exam is consistent with brain death     Acute respiratory failure  -intubated 6/28/24  -critical care on consult     Acute kidney injury  Hypernatremia-resolved  Metabolic acidosis  -Cr rising, worsening renal function  -likely episodes of hypotension   -on pressors  -now on sodium bicarb drip  -Hyperkalemia noted, being treated with Lokelma  -would not pursue dialysis given prognosis     Seizure  -on keppra     Elevated liver enzymes  -alk phos high earlier, now coming down   -borderline ast/alt  -ammonia normal   -RUQ ultrasound noted   -unclear etiology, likely secondary to underlying infection     Fluids: now on sodium bicarb drip   Diet: tube feeds   Code status: DNR    I have spoken to Mother last on 7/1. They were definitely in shock of patient's condition. We addressed code status, initially they wanted to  change to full, but after further discussion on her findings, prognosis, and likely poor outcome, we decided to keep her DNR. Seemed they needed time to cope with this.   They understand that she is critical but as of yesterday wanted to confirm diagnosis etc. Appreciate neurology attending discussion with family. I agree with Dr. Uriostegui's Assessment.  I also have mentioned to mother that she has signs of brain death on exam.   Discussed to have whatever family they need at bedside, and we can re-assess situation day by day. Would recommend compassionate wean when family is ready.    7/4: addressed with mother, brother, and father, as well as family member on the phone (who is a family physician). Went over overall care, prognosis, and options. I still have recommended pure comfort care and terminal wean most likely when they are ready. She is not improving neurologically and her brain damage is irreversible most likely, given her clinical exam.   Would not escalate care any further           Asbright Bowensy [facility] Hospitalist        Subjective:     On ventilator  Not breathing over vent  No gag reflex, no corneal, and has dilated pupils.   On pressors still  On bicarb drip  NELIA worsening    Long discussion with family     OBJECTIVE:    Blood pressure 96/56, pulse (!) 121, temperature 98 °F (36.7 °C), temperature source Temporal, resp. rate 13, height 5' 7\" (1.702 m), weight 120 lb 5.9 oz (54.6 kg), last menstrual period 06/27/2024, SpO2 95%.    Temp:  [97.5 °F (36.4 °C)-98.4 °F (36.9 °C)] 98 °F (36.7 °C)  Pulse:  [111-125] 121  Resp:  [12-19] 13  BP: ()/(42-61) 96/56  SpO2:  [93 %-97 %] 95 %  FiO2 (%):  [35 %-40 %] 35 %      Intake/Output:    Intake/Output Summary (Last 24 hours) at 7/4/2024 1326  Last data filed at 7/4/2024 0700  Gross per 24 hour   Intake 3569 ml   Output 1475 ml   Net 2094 ml       Last 3 Weights   07/02/24 0500 120 lb 5.9 oz (54.6 kg)   07/01/24 0405 118 lb 2.7 oz (53.6 kg)    06/30/24 0522 118 lb 9.7 oz (53.8 kg)   06/29/24 0357 126 lb 12.2 oz (57.5 kg)   06/27/24 0704 121 lb 4.1 oz (55 kg)   06/27/24 0042 92 lb (41.7 kg)       BP 96/56 (BP Location: Right arm)   Pulse (!) 121   Temp 98 °F (36.7 °C) (Temporal)   Resp 13   Ht 5' 7\" (1.702 m)   Wt 120 lb 5.9 oz (54.6 kg)   LMP 06/27/2024   SpO2 95%   BMI 18.85 kg/m²   General: intubated  Lungs: Mechanical breath sounds  Abdomen: soft  Extremities: No edema  Neuro: Fixed dilated pupils, no gag reflex     Data Review:       Labs:     Recent Labs   Lab 07/01/24 0617 07/02/24 0630 07/03/24  0438   RBC 3.59* 3.76* 3.62*   HGB 9.4* 9.7* 9.5*   HCT 28.6* 30.2* 29.0*   MCV 79.7* 80.3 80.1   MCH 26.2 25.8* 26.2   MCHC 32.9 32.1 32.8   RDW 16.6* 17.6* 18.0*   NEPRELIM 3.83 8.10* 12.84*   WBC 5.4 11.3* 15.7*   .0 209.0 175.0         Recent Labs   Lab 07/03/24  1743 07/04/24  0108 07/04/24  0944   * 106* 229*   BUN 23 27* 25*   CREATSERUM 2.24* 2.53* 2.63*   EGFRCR 27* 23* 22*   CA 7.4* 7.0* 6.6*   * 134* 129*   K 5.5* 5.9* 4.8    111 107   CO2 17.0* 18.0* 18.0*       Recent Labs   Lab 06/29/24  0542 06/30/24  0434 07/01/24  0617 07/02/24 0630 07/03/24  0438   ALT 35 35 29 38 39   AST 64* 71* 78* 107* 114*   ALB 2.5* 2.7* 2.2* 2.3* 2.2*         Imaging:  XR ABDOMEN (1 VIEW) (CPT=74018)    Result Date: 7/3/2024  CONCLUSION:   There is a paucity of bowel gas.  No bowel dilatation is identified.  Weighted feeding tube terminates overlying the expected location of the distal stomach.    Dictated by (CST): Albert Sebastian MD on 7/03/2024 at 11:21 AM     Finalized by (CST): Albert Sebastian MD on 7/03/2024 at 11:22 AM          XR CHEST AP PORTABLE  (CPT=71045)    Result Date: 7/1/2024  CONCLUSION:   Diffuse bilateral interstitial opacity which could reflect edema or infection.  Hazy opacity in the right lower lung which may reflect a layering right pleural effusion.  Patchy right perihilar opacity which could reflect  atelectasis with or without superimposed pneumonia.  Interval placement of a Dobbhoff tube which terminates in the proximal stomach.  The distal aspect of the tuft tube is mildly coiled in the proximal stomach with the tip in the region of the gastric fundus.  Left PICC line redemonstrated.  The tip of the PICC line is somewhat ill-defined but may be in the region of the upper right atrium.      Dictated by (CST): Phillip Omalley MD on 7/01/2024 at 4:01 PM     Finalized by (CST): Phillip Omalley MD on 7/01/2024 at 4:06 PM             Meds:      [START ON 7/5/2024] acyclovir  10 mg/kg Intravenous Q24H    [START ON 7/5/2024] cefepime  2 g Intravenous Q24H    famotidine  20 mg Intravenous Daily    vancomycin  125 mg Per NG Tube Daily    hydrocortisone Na succinate PF  100 mg Intravenous Q8H JAKE    sodium zirconium cyclosilicate  10 g Per NG Tube Q8H    aspirin  324 mg Per NG Tube Daily    sevelamer carbonate  800 mg Oral TID CC    doxycycline  100 mg Intravenous Q12H    insulin aspart  1-11 Units Subcutaneous Q6H    metRONIDAZOLE  500 mg Intravenous q6h    levETIRAcetam  500 mg Intravenous Q12H    chlorhexidine gluconate  15 mL Mouth/Throat BID@0800,2000    [Held by provider] vancomycin  1,000 mg Intravenous Q12H    heparin  5,000 Units Subcutaneous q12h      phenylephrine 320 mcg/min (07/04/24 0700)    sodium bicarbonate in D5W infusion 150 mEq (07/04/24 1324)    dextrose 10% 30 mL/hr at 07/04/24 0700    vasopressin (Vasostrict) 20 Units in sodium chloride 0.9% 100 mL infusion for septic shock 0.03 Units/min (07/04/24 1258)    norepinephrine 15 mcg/min (07/04/24 1142)    propofol Stopped (07/02/24 0000)       dextrose 10%    lipase-protease-amylase (Lip-Prot-Amyl) **AND** sodium bicarbonate    glucose **OR** glucose **OR** glucose-vitamin C **OR** dextrose **OR** glucose **OR** glucose **OR** glucose-vitamin C    labetalol    fentaNYL **OR** fentaNYL    polyethylene glycol (PEG 3350)    senna    bisacodyl    fleet  enema    acetaminophen    ondansetron    prochlorperazine    LORazepam    acetaminophen    hydrALAzine

## 2024-07-04 NOTE — PLAN OF CARE
Problem: RESPIRATORY - ADULT  Goal: Achieves optimal ventilation and oxygenation  Description: INTERVENTIONS:  - Assess for changes in respiratory status  - Assess for changes in mentation and behavior  - Position to facilitate oxygenation and minimize respiratory effort  - Oxygen supplementation based on oxygen saturation or ABGs  - Provide Smoking Cessation handout, if applicable  - Encourage broncho-pulmonary hygiene including cough, deep breathe, Incentive Spirometry  - Assess the need for suctioning and perform as needed  - Assess and instruct to report SOB or any respiratory difficulty  - Respiratory Therapy support as indicated  - Manage/alleviate anxiety  - Monitor for signs/symptoms of CO2 retention  Outcome: Not Progressing     Pt remains intubated with ETT size 7.0 secured at 24 cm at the lip, on full support. Pt saturating appropriately, FiO2 wean down to 35%, suction provided as needed, RT will continue to monitor.    Vent settings and readings as follow:     07/03/24 7580   Vent Information   Is this patient on Chronic Ventilation? No   Interface Invasive   Vent Type O  ([place])   Vent plugged into main power? Yes   Vent ID    Vent Mode VC/AC   Settings   FiO2 (%) 35 %   Resp Rate (Set) 12   Vt (Set, mL) 500 mL   Waveform Decelerating ramp   PEEP/CPAP (cm H2O) 5 cm H20   Insp Time (sec) 0.9 sec   Trigger Sensitivity Flow (L/min) 2 L/min   Humidification Heater   H2O Bag Level Filled   Heater Temperature 99 °F (37.2 °C)   Readings   Total RR 14   Minute Ventilation (L/min) 6.4 L/min   Expiratory Tidal Volume 463 mL   PIP Observed (cm H2O) 39 cm H2O   MAP (cm H2O) 11   I/E Ratio 1:3.4   Plateau Pressure (cm H2O) 30 cm H2O   Static Compliance (L/cm H2O) 19   Dynamic Compliance (L/cm H2O) 16 L/cm H2O   Alarms   High RR 40   Low RR 10   Insp Pressure High (cm H2O) 45 cm H2O   Insp Pressure Low (cm H2O) 10 cm H2O   MV High (L/min) 20 L/min   MV Low (L/min) 3 L/min   Apnea Interval (sec) 20  seconds   Apnea Rate 12   Apnea Volume (mL) 500 mL      07/03/24 2330   ETT   Placement Date: 06/28/24   Airway Size: 7 mm  Cuffed: Cuffed  Insertion attempts: 1  Placement Verification: Colorimetric EtCO2 device  Placed By: ICU physician   Secured at (cm) 24 cm   Suctioned? N   Measured From Lips   Secured Location Center   Secured by Commercial tube myers   Req'd equipment at bedside Bag mask   Additional Assessments   Pulse (!) 121   Resp 14   SpO2 94 %

## 2024-07-04 NOTE — PLAN OF CARE
Pt unresponsive on vent, pupils fixed and dilated. Only responsive to pain in lower extremities. No gag, cough or corneal's. Christiano, vaso and levo gtt cont. Levo attempted to titrate down overnight. Joshua and flexi intact, joshua minimal output overnight. CHG bath done. D10 running in place of TF. Bed locked in lowest position, call light within reach. Bed locked in lowest position.   Problem: SAFETY ADULT - FALL  Goal: Free from fall injury  Description: INTERVENTIONS:  - Assess pt frequently for physical needs  - Identify cognitive and physical deficits and behaviors that affect risk of falls.  - [place] fall precautions as indicated by assessment.  - Educate pt/family on patient safety including physical limitations  - Instruct pt to call for assistance with activity based on assessment  - Modify environment to reduce risk of injury  - Provide assistive devices as appropriate  - Consider OT/PT consult to assist with strengthening/mobility  - Encourage toileting schedule  Outcome: Progressing     Problem: Patient Centered Care  Goal: Patient preferences are identified and integrated in the patient's plan of care  Description: Interventions:  - What would you like us to know as we care for you?   - Provide timely, complete, and accurate information to patient/family  - Incorporate patient and family knowledge, values, beliefs, and cultural backgrounds into the planning and delivery of care  - Encourage patient/family to participate in care and decision-making at the level they choose  - Honor patient and family perspectives and choices  Outcome: Not Progressing     Problem: PAIN - ADULT  Goal: Verbalizes/displays adequate comfort level or patient's stated pain goal  Description: INTERVENTIONS:  - Encourage pt to monitor pain and request assistance  - Assess pain using appropriate pain scale  - Administer analgesics based on type and severity of pain and evaluate response  - Implement non-pharmacological measures as  appropriate and evaluate response  - Consider cultural and social influences on pain and pain management  - Manage/alleviate anxiety  - Utilize distraction and/or relaxation techniques  - Monitor for opioid side effects  - Notify MD/LIP if interventions unsuccessful or patient reports new pain  - Anticipate increased pain with activity and pre-medicate as appropriate  Outcome: Not Progressing     Problem: DISCHARGE PLANNING  Goal: Discharge to home or other facility with appropriate resources  Description: INTERVENTIONS:  - Identify barriers to discharge w/pt and caregiver  - Include patient/family/discharge partner in discharge planning  - Arrange for needed discharge resources and transportation as appropriate  - Identify discharge learning needs (meds, wound care, etc)  - Arrange for interpreters to assist at discharge as needed  - Consider post-discharge preferences of patient/family/discharge partner  - Complete POLST form as appropriate  - Assess patient's ability to be responsible for managing their own health  - Refer to Case Management Department for coordinating discharge planning if the patient needs post-hospital services based on physician/LIP order or complex needs related to functional status, cognitive ability or social support system  Outcome: Not Progressing     Problem: CARDIOVASCULAR - ADULT  Goal: Maintains optimal cardiac output and hemodynamic stability  Description: INTERVENTIONS:  - Monitor vital signs, rhythm, and trends  - Monitor for bleeding, hypotension and signs of decreased cardiac output  - Evaluate effectiveness of vasoactive medications to optimize hemodynamic stability  - Monitor arterial and/or venous puncture sites for bleeding and/or hematoma  - Assess quality of pulses, skin color and temperature  - Assess for signs of decreased coronary artery perfusion - ex. Angina  - Evaluate fluid balance, assess for edema, trend weights  Outcome: Not Progressing     Problem: NEUROLOGICAL  - ADULT  Goal: Achieves stable or improved neurological status  Description: INTERVENTIONS  - Assess for and report changes in neurological status  - Initiate measures to prevent increased intracranial pressure  - Maintain blood pressure and fluid volume within ordered parameters to optimize cerebral perfusion and minimize risk of hemorrhage  - Monitor temperature, glucose, and sodium. Initiate appropriate interventions as ordered  Outcome: Not Progressing  Goal: Absence of seizures  Description: INTERVENTIONS  - Monitor for seizure activity  - Administer anti-seizure medications as ordered  - Monitor neurological status  Outcome: Not Progressing  Goal: Remains free of injury related to seizure activity  Description: INTERVENTIONS:  - Maintain airway, patient safety  and administer oxygen as ordered  - Monitor patient for seizure activity, document and report duration and description of seizure to MD/LIP  - If seizure occurs, turn patient to side and suction secretions as needed  - Reorient patient post seizure  - Seizure pads on all 4 side rails  - Instruct patient/family to notify RN of any seizure activity  - Instruct patient/family to call for assistance with activity based on assessment  Outcome: Not Progressing     Problem: RESPIRATORY - ADULT  Goal: Achieves optimal ventilation and oxygenation  Description: INTERVENTIONS:  - Assess for changes in respiratory status  - Assess for changes in mentation and behavior  - Position to facilitate oxygenation and minimize respiratory effort  - Oxygen supplementation based on oxygen saturation or ABGs  - Provide Smoking Cessation handout, if applicable  - Encourage broncho-pulmonary hygiene including cough, deep breathe, Incentive Spirometry  - Assess the need for suctioning and perform as needed  - Assess and instruct to report SOB or any respiratory difficulty  - Respiratory Therapy support as indicated  - Manage/alleviate anxiety  - Monitor for signs/symptoms of CO2  retention  Outcome: Not Progressing     Problem: GENITOURINARY - ADULT  Goal: Absence of urinary retention  Description: INTERVENTIONS:  - Assess patient’s ability to void and empty bladder  - Monitor intake/output and perform bladder scan as needed  - Follow urinary retention protocol/standard of care  - Consider collaborating with pharmacy to review patient's medication profile  - Implement strategies to promote bladder emptying  Outcome: Not Progressing

## 2024-07-04 NOTE — PROGRESS NOTES
NEPHROLOGY DAILY PROGRESS NOTE       SUBJECTIVE:  Intubated, sedated       OBJECTIVE:    Total Intake/Output:    Intake/Output Summary (Last 24 hours) at 7/4/2024 1509  Last data filed at 7/4/2024 0700  Gross per 24 hour   Intake 1687 ml   Output 800 ml   Net 887 ml         PHYSICAL EXAM:  BP 96/56 (BP Location: Right arm)   Pulse (!) 121   Temp 98 °F (36.7 °C) (Temporal)   Resp 13   Ht 5' 7\" (1.702 m)   Wt 120 lb 5.9 oz (54.6 kg)   LMP 06/27/2024   SpO2 95%   BMI 18.85 kg/m²   GEN: intubated, sedated   HEENT:  ETT in place   CHEST: ventilated  CARDIAC: S1S2 normal  ABD: soft, NT/ND  : joshua in place   EXT: trace lower ext edema  NEURO: sedated         CURRENT MEDICATIONS:  Current Facility-Administered Medications   Medication Dose Route Frequency    [START ON 7/5/2024] acyclovir (Zovirax) 550 mg in sodium chloride 0.9% 100 mL IVPB  10 mg/kg Intravenous Q24H    [START ON 7/5/2024] ceFEPIme (Maxpime) 2 g in sodium chloride 0.9% 100 mL IVPB-MBP  2 g Intravenous Q24H    famotidine (Pepcid) 20 mg/2mL injection 20 mg  20 mg Intravenous Daily    vancomycin (Firvanq) 50 mg/mL oral solution 125 mg  125 mg Per NG Tube Daily    hydrocortisone Na succinate PF (Solu-CORTEF) injection 100 mg  100 mg Intravenous Q8H JAKE    phenylephrine (Christiano-Synephrine) 250 mg in sodium chloride 0.9% 250 mL infusion   mcg/min Intravenous Continuous    sodium zirconium cyclosilicate (Lokelma) oral packet 10 g  10 g Per NG Tube Q8H    aspirin chewable tab 324 mg  324 mg Per NG Tube Daily    sevelamer carbonate (Renvela) tab 800 mg  800 mg Oral TID CC    doxycycline hyclate (Vibramycin) 100 mg in sodium chloride 0.9% 100 mL IVPB  100 mg Intravenous Q12H    dextrose 10% infusion (TPN no rate)   Intravenous Continuous PRN    pancrelipase (Lip-Prot-Amyl) (Zenpep) DR particles cap 10,000 Units  10,000 Units Per G Tube PRN    And    sodium bicarbonate tab 325 mg  325 mg Oral PRN    glucose (Dex4) 15 GM/59ML oral liquid 15 g  15 g Oral  Q15 Min PRN    Or    glucose (Glutose) 40% oral gel 15 g  15 g Oral Q15 Min PRN    Or    glucose-vitamin C (Dex-4) chewable tab 4 tablet  4 tablet Oral Q15 Min PRN    Or    dextrose 50% injection 50 mL  50 mL Intravenous Q15 Min PRN    Or    glucose (Dex4) 15 GM/59ML oral liquid 30 g  30 g Oral Q15 Min PRN    Or    glucose (Glutose) 40% oral gel 30 g  30 g Oral Q15 Min PRN    Or    glucose-vitamin C (Dex-4) chewable tab 8 tablet  8 tablet Oral Q15 Min PRN    vasopressin (Vasostrict) 20 Units in sodium chloride 0.9% 100 mL infusion for septic shock  0.01-0.03 Units/min Intravenous Continuous    insulin aspart (NovoLOG) 100 Units/mL FlexPen 1-11 Units  1-11 Units Subcutaneous Q6H    labetalol (Trandate) 5 mg/mL injection 10 mg  10 mg Intravenous Q6H PRN    norepinephrine (Levophed) 4 mg/250mL infusion premix  0.5-30 mcg/min Intravenous Continuous    metRONIDAZOLE in sodium chloride 0.79% (Flagyl) 5 mg/mL IVPB premix 500 mg  500 mg Intravenous q6h    levETIRAcetam (Keppra) 500 mg/5mL injection 500 mg  500 mg Intravenous Q12H    fentaNYL (Sublimaze) 50 mcg/mL injection 25 mcg  25 mcg Intravenous Q30 Min PRN    Or    fentaNYL (Sublimaze) 50 mcg/mL injection 50 mcg  50 mcg Intravenous Q30 Min PRN    polyethylene glycol (PEG 3350) (Miralax) 17 g oral packet 17 g  17 g Oral Daily PRN    senna (Senokot) 8.8 MG/5ML oral syrup 17.6 mg  10 mL Oral Nightly PRN    bisacodyl (Dulcolax) 10 MG rectal suppository 10 mg  10 mg Rectal Daily PRN    fleet enema (Fleet) 7-19 GM/118ML rectal enema 133 mL  1 enema Rectal Once PRN    chlorhexidine gluconate (Peridex) 0.12 % oral solution 15 mL  15 mL Mouth/Throat BID@0800,2000    propofol (Diprivan) 10 mg/mL infusion premix  5-50 mcg/kg/min (Dosing Weight) Intravenous Continuous    acetaminophen (Tylenol Extra Strength) tab 500 mg  500 mg Oral Q4H PRN    ondansetron (Zofran) 4 MG/2ML injection 4 mg  4 mg Intravenous Q6H PRN    prochlorperazine (Compazine) 10 MG/2ML injection 5 mg  5 mg  Intravenous Q8H PRN    [Held by provider] vancomycin (Vancocin) 1,000 mg in sodium chloride 0.9% 250 mL IVPB-ADDV  1,000 mg Intravenous Q12H    LORazepam (Ativan) 2 mg/mL injection 2 mg  2 mg Intravenous Q10 Min PRN    heparin (Porcine) 5000 UNIT/ML injection 5,000 Units  5,000 Units Subcutaneous q12h    acetaminophen (Ofirmev) 10 mg/mL infusion premix 1,000 mg  1,000 mg Intravenous Q6H PRN    hydrALAzine (Apresoline) 20 mg/mL injection 10 mg  10 mg Intravenous Q6H PRN           LABS:  Patient Labs Reviewed in Detail. Pertinent Labs as follows:  Recent Labs   Lab 07/03/24  1743 07/04/24  0108 07/04/24  0944   * 106* 229*   BUN 23 27* 25*   CREATSERUM 2.24* 2.53* 2.63*   EGFRCR 27* 23* 22*   CA 7.4* 7.0* 6.6*   * 134* 129*   K 5.5* 5.9* 4.8    111 107   CO2 17.0* 18.0* 18.0*     Recent Labs   Lab 07/01/24  0617 07/02/24  0630 07/03/24  0438   RBC 3.59* 3.76* 3.62*   HGB 9.4* 9.7* 9.5*   HCT 28.6* 30.2* 29.0*   MCV 79.7* 80.3 80.1   MCH 26.2 25.8* 26.2   MCHC 32.9 32.1 32.8   RDW 16.6* 17.6* 18.0*   NEPRELIM 3.83 8.10* 12.84*   WBC 5.4 11.3* 15.7*   .0 209.0 175.0         IMAGING:  XR ABDOMEN (1 VIEW) (CPT=74018)    Result Date: 7/3/2024  CONCLUSION:   There is a paucity of bowel gas.  No bowel dilatation is identified.  Weighted feeding tube terminates overlying the expected location of the distal stomach.    Dictated by (CST): Albert Sebastian MD on 7/03/2024 at 11:21 AM     Finalized by (CST): Albert Sebastian MD on 7/03/2024 at 11:22 AM            ASSESSMENT AND PLAN:   This is 43 year old female with no sig PMH. Presented with headaches. Currently admitted for sepsis, encephalopathy and possible meningoencephalitis complicated by seizure.  Nephrology is consulted for hypernatremia.      Hypernatremia - improved  - Suspect rapid correction of Na due to polyuria   - Continue free water flushes 150ml/q4 hours - can titrate as needed  - Management of hyperglycemia per primary   - Monitor urine  output      Oliguric NELIA  -Likely pre-renal/ATN in setting of hypoperfusion, concern for possible acyclovir induced nephropathy?  -Maintain renal perfusion pressures  -Check vancomycin trough level  -Would hold acylovir if possible - discuss with ID  -Monitor intake/output closely  -Avoid nephrotoxins and renally dose medications for creatinine clearance    Hyperkalemia  -Lokelma  -Nepro tube feeds    Metabolic acidosis  -Monitor closely    Hyperphosphatemia  -Renvela 800mg TID  -Nepro               Critical care time >35 mins    We will continue to follow DO Sanjana Lewisy - Nephrology

## 2024-07-05 NOTE — PLAN OF CARE
Pt unresponsive on vent. No gag, cough or corneal's. Pupils fixed, non reactive. Christiano, vaso and levo cont, titrated down overnight. CHG bath done. [place] and flexi intact. Bed locked in lowest position, call light within reach. Safety maintained.   Problem: CARDIOVASCULAR - ADULT  Goal: Maintains optimal cardiac output and hemodynamic stability  Description: INTERVENTIONS:  - Monitor vital signs, rhythm, and trends  - Monitor for bleeding, hypotension and signs of decreased cardiac output  - Evaluate effectiveness of vasoactive medications to optimize hemodynamic stability  - Monitor arterial and/or venous puncture sites for bleeding and/or hematoma  - Assess quality of pulses, skin color and temperature  - Assess for signs of decreased coronary artery perfusion - ex. Angina  - Evaluate fluid balance, assess for edema, trend weights  Outcome: Progressing     Problem: RESPIRATORY - ADULT  Goal: Achieves optimal ventilation and oxygenation  Description: INTERVENTIONS:  - Assess for changes in respiratory status  - Assess for changes in mentation and behavior  - Position to facilitate oxygenation and minimize respiratory effort  - Oxygen supplementation based on oxygen saturation or ABGs  - Provide Smoking Cessation handout, if applicable  - Encourage broncho-pulmonary hygiene including cough, deep breathe, Incentive Spirometry  - Assess the need for suctioning and perform as needed  - Assess and instruct to report SOB or any respiratory difficulty  - Respiratory Therapy support as indicated  - Manage/alleviate anxiety  - Monitor for signs/symptoms of CO2 retention  Outcome: Progressing     Problem: Diabetes/Glucose Control  Goal: Glucose maintained within prescribed range  Description: INTERVENTIONS:  - Monitor Blood Glucose as ordered  - Assess for signs and symptoms of hyperglycemia and hypoglycemia  - Administer ordered medications to maintain glucose within target range  - Assess barriers to adequate nutritional  intake and initiate nutrition consult as needed  - Instruct patient on self management of diabetes  Outcome: Progressing     Problem: Patient Centered Care  Goal: Patient preferences are identified and integrated in the patient's plan of care  Description: Interventions:  - What would you like us to know as we care for you?   - Provide timely, complete, and accurate information to patient/family  - Incorporate patient and family knowledge, values, beliefs, and cultural backgrounds into the planning and delivery of care  - Encourage patient/family to participate in care and decision-making at the level they choose  - Honor patient and family perspectives and choices  Outcome: Not Progressing     Problem: PAIN - ADULT  Goal: Verbalizes/displays adequate comfort level or patient's stated pain goal  Description: INTERVENTIONS:  - Encourage pt to monitor pain and request assistance  - Assess pain using appropriate pain scale  - Administer analgesics based on type and severity of pain and evaluate response  - Implement non-pharmacological measures as appropriate and evaluate response  - Consider cultural and social influences on pain and pain management  - Manage/alleviate anxiety  - Utilize distraction and/or relaxation techniques  - Monitor for opioid side effects  - Notify MD/LIP if interventions unsuccessful or patient reports new pain  - Anticipate increased pain with activity and pre-medicate as appropriate  Outcome: Not Progressing     Problem: SAFETY ADULT - FALL  Goal: Free from fall injury  Description: INTERVENTIONS:  - Assess pt frequently for physical needs  - Identify cognitive and physical deficits and behaviors that affect risk of falls.  - [place] fall precautions as indicated by assessment.  - Educate pt/family on patient safety including physical limitations  - Instruct pt to call for assistance with activity based on assessment  - Modify environment to reduce risk of injury  - Provide assistive  devices as appropriate  - Consider OT/PT consult to assist with strengthening/mobility  - Encourage toileting schedule  Outcome: Not Progressing     Problem: DISCHARGE PLANNING  Goal: Discharge to home or other facility with appropriate resources  Description: INTERVENTIONS:  - Identify barriers to discharge w/pt and caregiver  - Include patient/family/discharge partner in discharge planning  - Arrange for needed discharge resources and transportation as appropriate  - Identify discharge learning needs (meds, wound care, etc)  - Arrange for interpreters to assist at discharge as needed  - Consider post-discharge preferences of patient/family/discharge partner  - Complete POLST form as appropriate  - Assess patient's ability to be responsible for managing their own health  - Refer to Case Management Department for coordinating discharge planning if the patient needs post-hospital services based on physician/LIP order or complex needs related to functional status, cognitive ability or social support system  Outcome: Not Progressing     Problem: NEUROLOGICAL - ADULT  Goal: Achieves stable or improved neurological status  Description: INTERVENTIONS  - Assess for and report changes in neurological status  - Initiate measures to prevent increased intracranial pressure  - Maintain blood pressure and fluid volume within ordered parameters to optimize cerebral perfusion and minimize risk of hemorrhage  - Monitor temperature, glucose, and sodium. Initiate appropriate interventions as ordered  Outcome: Not Progressing  Goal: Absence of seizures  Description: INTERVENTIONS  - Monitor for seizure activity  - Administer anti-seizure medications as ordered  - Monitor neurological status  Outcome: Not Progressing  Goal: Remains free of injury related to seizure activity  Description: INTERVENTIONS:  - Maintain airway, patient safety  and administer oxygen as ordered  - Monitor patient for seizure activity, document and report  duration and description of seizure to MD/LIP  - If seizure occurs, turn patient to side and suction secretions as needed  - Reorient patient post seizure  - Seizure pads on all 4 side rails  - Instruct patient/family to notify RN of any seizure activity  - Instruct patient/family to call for assistance with activity based on assessment  Outcome: Not Progressing     Problem: GENITOURINARY - ADULT  Goal: Absence of urinary retention  Description: INTERVENTIONS:  - Assess patient’s ability to void and empty bladder  - Monitor intake/output and perform bladder scan as needed  - Follow urinary retention protocol/standard of care  - Consider collaborating with pharmacy to review patient's medication profile  - Implement strategies to promote bladder emptying  Outcome: Not Progressing

## 2024-07-05 NOTE — SPIRITUAL CARE NOTE
Spiritual Care Visit Note    Patient Name: Calos Hernandez Date of Spiritual Care Visit: 24   : 1980 Primary Dx: Meningoencephalitis (HCC)       Referred By: Referral From: Nurse    Spiritual Care Taxonomy:    Intended Effects: Convey a calming presence    Methods: Offer spiritual/Judaism support    Interventions: Silent prayer;Provide hospitality    Visit Type/Summary:     - Spiritual Care: Responded to a request for spiritual care and assessed the patient for spiritual care needs. Consulted with RN prior to visit. Provided information regarding how to contact Spiritual Care and left a Spiritual Care information card. Attempted visit. Patient is unavailable. Left a Spiritual Care contact information card. Attempted visit with patient and family to provide grief support. Family was not present at the time of visit,  prayed silently over patient and left a note and contact card for family. RN shared patient will have care withdrawn this evening.  is available to support the family as needed.    Spiritual Care support can be requested via an Epic consult. For urgent/immediate needs, please contact the On Call  at: [place]: ext [phone]    Rev Lynn Hernandes

## 2024-07-05 NOTE — PROGRESS NOTES
NEPHROLOGY DAILY PROGRESS NOTE       SUBJECTIVE:  Intubated, unresponsive      OBJECTIVE:    Total Intake/Output:    Intake/Output Summary (Last 24 hours) at 7/5/2024 0957  Last data filed at 7/5/2024 0600  Gross per 24 hour   Intake 2876 ml   Output 350 ml   Net 2526 ml         PHYSICAL EXAM:  BP 92/52 (BP Location: Right arm)   Pulse 101   Temp 97.6 °F (36.4 °C) (Temporal)   Resp 12   Ht 5' 7\" (1.702 m)   Wt 121 lb 4.1 oz (55 kg)   LMP 06/27/2024   SpO2 93%   BMI 18.99 kg/m²   GEN: intubated  HEENT:  ETT in place   CHEST: ventilated  CARDIAC: S1S2 \  ABD: soft, NT/ND  : joshua in place   EXT: +1 lower ext edema  NEURO: unresponsive        CURRENT MEDICATIONS:  Current Facility-Administered Medications   Medication Dose Route Frequency    sodium bicarbonate 150 mEq in dextrose 5% 1,000 mL infusion  150 mEq Intravenous Continuous    [Held by provider] acyclovir (Zovirax) 550 mg in sodium chloride 0.9% 100 mL IVPB  10 mg/kg Intravenous Q24H    ceFEPIme (Maxpime) 2 g in sodium chloride 0.9% 100 mL IVPB-MBP  2 g Intravenous Q24H    famotidine (Pepcid) 20 mg/2mL injection 20 mg  20 mg Intravenous Daily    vancomycin (Firvanq) 50 mg/mL oral solution 125 mg  125 mg Per NG Tube Daily    hydrocortisone Na succinate PF (Solu-CORTEF) injection 100 mg  100 mg Intravenous Q8H [facility]    Vancomycin: PHARMACY DOSING  1 each Intravenous See Admin Instructions (RX holding)    phenylephrine (Christiano-Synephrine) 250 mg in sodium chloride 0.9% 250 mL infusion   mcg/min Intravenous Continuous    sodium zirconium cyclosilicate (Lokelma) oral packet 10 g  10 g Per NG Tube Q8H    aspirin chewable tab 324 mg  324 mg Per NG Tube Daily    sevelamer carbonate (Renvela) tab 800 mg  800 mg Oral TID CC    doxycycline hyclate (Vibramycin) 100 mg in sodium chloride 0.9% 100 mL IVPB  100 mg Intravenous Q12H    dextrose 10% infusion (TPN no rate)   Intravenous Continuous PRN    pancrelipase (Lip-Prot-Amyl) (Zenpep) DR particles cap 10,000  Units  10,000 Units Per G Tube PRN    And    sodium bicarbonate tab 325 mg  325 mg Oral PRN    glucose (Dex4) 15 GM/59ML oral liquid 15 g  15 g Oral Q15 Min PRN    Or    glucose (Glutose) 40% oral gel 15 g  15 g Oral Q15 Min PRN    Or    glucose-vitamin C (Dex-4) chewable tab 4 tablet  4 tablet Oral Q15 Min PRN    Or    dextrose 50% injection 50 mL  50 mL Intravenous Q15 Min PRN    Or    glucose (Dex4) 15 GM/59ML oral liquid 30 g  30 g Oral Q15 Min PRN    Or    glucose (Glutose) 40% oral gel 30 g  30 g Oral Q15 Min PRN    Or    glucose-vitamin C (Dex-4) chewable tab 8 tablet  8 tablet Oral Q15 Min PRN    vasopressin (Vasostrict) 20 Units in sodium chloride 0.9% 100 mL infusion for septic shock  0.01-0.03 Units/min Intravenous Continuous    insulin aspart (NovoLOG) 100 Units/mL FlexPen 1-11 Units  1-11 Units Subcutaneous Q6H    labetalol (Trandate) 5 mg/mL injection 10 mg  10 mg Intravenous Q6H PRN    norepinephrine (Levophed) 4 mg/250mL infusion premix  0.5-30 mcg/min Intravenous Continuous    metRONIDAZOLE in sodium chloride 0.79% (Flagyl) 5 mg/mL IVPB premix 500 mg  500 mg Intravenous q6h    levETIRAcetam (Keppra) 500 mg/5mL injection 500 mg  500 mg Intravenous Q12H    fentaNYL (Sublimaze) 50 mcg/mL injection 25 mcg  25 mcg Intravenous Q30 Min PRN    Or    fentaNYL (Sublimaze) 50 mcg/mL injection 50 mcg  50 mcg Intravenous Q30 Min PRN    polyethylene glycol (PEG 3350) (Miralax) 17 g oral packet 17 g  17 g Oral Daily PRN    senna (Senokot) 8.8 MG/5ML oral syrup 17.6 mg  10 mL Oral Nightly PRN    bisacodyl (Dulcolax) 10 MG rectal suppository 10 mg  10 mg Rectal Daily PRN    fleet enema (Fleet) 7-19 GM/118ML rectal enema 133 mL  1 enema Rectal Once PRN    chlorhexidine gluconate (Peridex) 0.12 % oral solution 15 mL  15 mL Mouth/Throat BID@0800,2000    propofol (Diprivan) 10 mg/mL infusion premix  5-50 mcg/kg/min (Dosing Weight) Intravenous Continuous    acetaminophen (Tylenol Extra Strength) tab 500 mg  500 mg Oral  Q4H PRN    ondansetron (Zofran) 4 MG/2ML injection 4 mg  4 mg Intravenous Q6H PRN    prochlorperazine (Compazine) 10 MG/2ML injection 5 mg  5 mg Intravenous Q8H PRN    LORazepam (Ativan) 2 mg/mL injection 2 mg  2 mg Intravenous Q10 Min PRN    heparin (Porcine) 5000 UNIT/ML injection 5,000 Units  5,000 Units Subcutaneous q12h    acetaminophen (Ofirmev) 10 mg/mL infusion premix 1,000 mg  1,000 mg Intravenous Q6H PRN    hydrALAzine (Apresoline) 20 mg/mL injection 10 mg  10 mg Intravenous Q6H PRN           LABS:  Patient Labs Reviewed in Detail. Pertinent Labs as follows:  Recent Labs   Lab 07/04/24  1527 07/04/24  2346 07/05/24  0821   * 105* 90   BUN 24* 31* 32*   CREATSERUM 2.41* 3.13* 3.22*   EGFRCR 25* 18* 17*   CA 5.4* 7.4* 7.4*   * 128* 129*   K 4.1 5.9* 5.4*   CL 93* 102 105   CO2 32.0 20.0* 19.0*     Recent Labs   Lab 07/01/24  0617 07/02/24  0630 07/03/24  0438   RBC 3.59* 3.76* 3.62*   HGB 9.4* 9.7* 9.5*   HCT 28.6* 30.2* 29.0*   MCV 79.7* 80.3 80.1   MCH 26.2 25.8* 26.2   MCHC 32.9 32.1 32.8   RDW 16.6* 17.6* 18.0*   NEPRELIM 3.83 8.10* 12.84*   WBC 5.4 11.3* 15.7*   .0 209.0 175.0         IMAGING:  XR ABDOMEN (1 VIEW) (CPT=74018)    Result Date: 7/3/2024  CONCLUSION:   There is a paucity of bowel gas.  No bowel dilatation is identified.  Weighted feeding tube terminates overlying the expected location of the distal stomach.    Dictated by (CST): Albert Sebastian MD on 7/03/2024 at 11:21 AM     Finalized by (CST): Albert Sebastian MD on 7/03/2024 at 11:22 AM            ASSESSMENT AND PLAN:   This is 43 year old female with no sig PMH. Presented with headaches. Currently admitted for sepsis, encephalopathy and possible meningoencephalitis complicated by seizure.  Nephrology is consulted for hypernatremia.      Hypernatremia - improved  - Suspect secondary to rapid correction of Na due to polyuria   - Continue free water flushes 150ml/q4 hours - can titrate as needed  - Management of  hyperglycemia per primary   - Monitor urine output      Oliguric NELIA  -Likely pre-renal/ATN in setting of hypoperfusion, concern for possible acyclovir induced nephropathy?  -Maintain renal perfusion pressures  -Check vancomycin trough level  -Would hold acylovir   -Monitor intake/output closely  -Avoid nephrotoxins and renally dose medications for creatinine clearance  -Due to poor prognosis, patient is not a dialysis candidate.    Hyperkalemia  -Lokelma  -Nepro tube feeds  -Bicarb gtt    Metabolic acidosis  -Bicarb gtt    Hyperphosphatemia  -Renvela 800mg TID  -Nepro               Critical care time >35 mins    We will continue to follow DO Sanjana Lewisy - Nephrology

## 2024-07-05 NOTE — SPIRITUAL CARE NOTE
Spiritual Care Visit Note    Patient Name: Calos Hernandez Date of Spiritual Care Visit: 24   : 1980 Primary Dx: Meningoencephalitis (HCC)       Referred By: Referral From: Nurse, Grief Support        Visit Type/Summary:     recieved call from RN requesting visit for grief support for family.   Family received poor prognosis, writer attempted to visit with family but they were not present in patient room.     - Spiritual Care: Responded to a request via the on call phone Consulted with RN prior to visit.  remains available as needed for follow up.    Spiritual Care support can be requested via an [facility] consult. For urgent/immediate needs, please contact the On Call  at: [place]: ext [phone]    Chaplain Jaden.

## 2024-07-05 NOTE — RESPIRATORY THERAPY NOTE
Pt received last night at 2300 on VC/AC rate 12, Vt 500, PEEP +5, FiO2 35% tolerating well. ETT 7.0, 24 cm at the lips. Bilat BS auscultated, pt suctioned as needed with small amounts of thick white secretions. No changes were made to the vent overnight. RT will continue to monitor.

## 2024-07-05 NOTE — PLAN OF CARE
Problem: ALTERED NUTRIENT INTAKE - ADULT  Goal: Nutrient intake appropriate for improving, restoring or maintaining nutritional needs  Description: INTERVENTIONS:  - Assess nutritional status and recommend course of action  - Monitor oral intake, labs, and treatment plans  - Recommend appropriate diets, oral nutritional supplements, and vitamin/mineral supplements  - Recommend, monitor, and adjust tube feedings and TPN/PPN based on assessed needs  - Provide specific nutrition education as appropriate  7/5/2024 1207 by Emily Cai, RD  Outcome: Not Progressing

## 2024-07-05 NOTE — PROGRESS NOTES
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

## 2024-07-05 NOTE — PROGRESS NOTES
[facility] Pulmonary, Critical Care and Sleep    Calos Hernandez Patient Status:  Inpatient    1980 MRN [id number]   Location [facility] 2W/SW Attending Alejandro Bob DO   Hosp Day # 8 PCP No primary care provider on file.     Date of Admission: 2024  1:20 AM    Admission Diagnosis: Hypokalemia [E87.6]  Delirium [R41.0]  Hyponatremia [E87.1]  New onset seizure (HCC) [R56.9]    S: No events overnight. Noted no BS reflexes.     Scheduled Medications:     [Held by provider] acyclovir  10 mg/kg Intravenous Q24H    cefepime  2 g Intravenous Q24H    famotidine  20 mg Intravenous Daily    vancomycin  125 mg Per NG Tube Daily    hydrocortisone Na succinate PF  100 mg Intravenous Q8H [facility]    Vancomycin IV  1 each Intravenous See Admin Instructions (RX holding)    sodium zirconium cyclosilicate  10 g Per NG Tube Q8H    aspirin  324 mg Per NG Tube Daily    sevelamer carbonate  800 mg Oral TID CC    doxycycline  100 mg Intravenous Q12H    insulin aspart  1-11 Units Subcutaneous Q6H    metRONIDAZOLE  500 mg Intravenous q6h    levETIRAcetam  500 mg Intravenous Q12H    chlorhexidine gluconate  15 mL Mouth/Throat BID@0800,2000    heparin  5,000 Units Subcutaneous q12h       Infusing Medications:     sodium bicarbonate in D5W infusion      phenylephrine 300 mcg/min (24 0600)    dextrose 10% 30 mL/hr at 24 0600    vasopressin (Vasostrict) 20 Units in sodium chloride 0.9% 100 mL infusion for septic shock 0.03 Units/min (24 0815)    norepinephrine 1 mcg/min (24 0600)    propofol Stopped (24 0000)       PRN Medications:    dextrose 10%    lipase-protease-amylase (Lip-Prot-Amyl) **AND** sodium bicarbonate    glucose **OR** glucose **OR** glucose-vitamin C **OR** dextrose **OR** glucose **OR** glucose **OR** glucose-vitamin C    labetalol    fentaNYL **OR** fentaNYL    polyethylene glycol (PEG 3350)    senna    bisacodyl    fleet enema    acetaminophen    ondansetron    prochlorperazine     LORazepam    acetaminophen    hydrALAzine    OBJECTIVE:  BP 92/52 (BP Location: Right arm)   Pulse 101   Temp 97.6 °F (36.4 °C) (Temporal)   Resp 12   Ht 170.2 cm (5' 7\")   Wt 121 lb 4.1 oz (55 kg)   LMP 2024   SpO2 93%   BMI 18.99 kg/m²    Temp (24hrs), Av.6 °F (36.4 °C), Min:97.2 °F (36.2 °C), Max:98 °F (36.7 °C)      Vent Mode: VC/AC  FiO2 (%):  [35 %-40 %] 35 %  S RR:  [12] 12  S VT:  [500 mL] 500 mL  PEEP/CPAP (cm H2O):  [5 cm H20] 5 cm H20  MAP (cm H2O):  [9-10] 10      Wt Readings from Last 3 Encounters:   24 121 lb 4.1 oz (55 kg)       I/O last 3 completed shifts:  In: 4563 [I.V.:3663; IV PIGGYBACK:900]  Out: 900 [Urine:200; Stool:700]  No intake/output data recorded.     General: intubated and NAD.  Neuro: Off sedation, no GAG  HEENT: Pupils fixed, dilated, no corneal reflexes, VOR absent.   Neck : No LAD  CV: RRR, nl S1, S2, no S4, S3 or murmur.   Lungs: Clear bilaterally.   Abd: Nontender, non distended.    Ext: No edema.   Skin: No rashes.   Recent Labs   Lab 24  0617 24  0630 24  0438   WBC 5.4 11.3* 15.7*   HGB 9.4* 9.7* 9.5*   HCT 28.6* 30.2* 29.0*   .0 209.0 175.0     Recent Labs   Lab 24  0617 24  0814 24  0630 24  1610 24  0438 24  0828 24  1527 24  2346 24  0821   GLU 92   < > 88  85   < > 93   < > 682* 105* 90   BUN 6*   < > 11  11   < > 20   < > 24* 31* 32*   CREATSERUM 0.74   < > 1.07*  1.10*   < > 1.86*   < > 2.41* 3.13* 3.22*   CA 7.1*   < > 7.7*  7.3*   < > 7.8*   < > 5.4* 7.4* 7.4*   *   < > 149*  149*   < > 140   < > 126* 128* 129*   K 3.6  3.6   < > 4.9  4.9   < > 6.0*   < > 4.1 5.9* 5.4*   *   < > 122*  123*   < > 118*   < > 93* 102 105   CO2 20.0*   < > 18.0*  18.0*   < > 15.0*   < > 32.0 20.0* 19.0*   AST 78*  --  107*  --  114*  --   --   --   --    ALT 29  --  38  --  39  --   --   --   --    ALB 2.2*  --  2.3*  --  2.2*  --   --   --   --     < > = values in  this interval not displayed.     No results for input(s): \"INR\", \"PTT\" in the last 168 hours.  Recent Labs   Lab 07/01/24  1526   ABGPHT 7.29*   NUAXGM0I 40   ZFATV6G 72*   ABGHCO3 19.5*   SITE Right Radial       COVID-19 Lab Results    COVID-19  Lab Results   Component Value Date    COVID19 Not Detected 06/29/2024       Pro-Calcitonin  Recent Labs   Lab 06/28/24  1805   PCT 0.68*       Cardiac  No results for input(s): \"TROP\", \"PBNP\" in the last 168 hours.    Creatinine Kinase  No results for input(s): \"CK\" in the last 168 hours.    Inflammatory Markers  No results for input(s): \"CRP\", \"DIEGO\", \"LDH\", \"DDIMER\" in the last 168 hours.    Assessment/Plan   Acute respiratory failure - intubated for airway protection  - continue full vent support  Septic vs neurogenic shock  - vasopressors as needed, currently on vaso, levo and jessica.  - anti-infectives per ID service  - will stress dose steroids.  - appears euvolemic.  Meningoencephalitis with anoxic brain injury on MRI - EEG with no brain activity and loss of brainstem reflexes, neurologic recovery is dismal  - anti-infectives per ID service  - neurology is following  - Brainstem reflexes absent and c/w brain death.   - Check apnea test.   Hypernatremia from rapid correction  - Resolved after FWF; water deficit corrected  - Further per nephrology  FEN  - TFs  Ppx  - subcu heparin  - famotidine  Dispo  - DNAR/select  -Grim prognosis with findings concerning for brain death. Confirmatory testing pending. [facility] notification would be indicated.   Critical Care Time greater than: 35 minutes    My best regards,         Adriel Tyson MD  [facility] Pulmonary, Critical Care and Sleep Medicine

## 2024-07-05 NOTE — PLAN OF CARE
Pt declared brain dead at 1452 by critical care APN. This was discussed at bedside and via phone with father, mother, and life partner. Family requested to wait for extubation until sister comes. Family did not come at designated time. Phone call made and father stated they would not come and stated to take patient off of ventilator. Instructed to come tomorrow to gather documented belongings. Taken off at 1835. Critical care APN aware. Belonging to be sent down with patient to the [facility] [place] [facility].   Problem: Patient Centered Care  Goal: Patient preferences are identified and integrated in the patient's plan of care  Description: Interventions:  - What would you like us to know as we care for you?   - Provide timely, complete, and accurate information to patient/family  - Incorporate patient and family knowledge, values, beliefs, and cultural backgrounds into the planning and delivery of care  - Encourage patient/family to participate in care and decision-making at the level they choose  - Honor patient and family perspectives and choices  Outcome: Completed     Problem: PAIN - ADULT  Goal: Verbalizes/displays adequate comfort level or patient's stated pain goal  Description: INTERVENTIONS:  - Encourage pt to monitor pain and request assistance  - Assess pain using appropriate pain scale  - Administer analgesics based on type and severity of pain and evaluate response  - Implement non-pharmacological measures as appropriate and evaluate response  - Consider cultural and social influences on pain and pain management  - Manage/alleviate anxiety  - Utilize distraction and/or relaxation techniques  - Monitor for opioid side effects  - Notify MD/LIP if interventions unsuccessful or patient reports new pain  - Anticipate increased pain with activity and pre-medicate as appropriate  Outcome: Completed     Problem: SAFETY ADULT - FALL  Goal: Free from fall injury  Description: INTERVENTIONS:  - Assess pt frequently for physical  needs  - Identify cognitive and physical deficits and behaviors that affect risk of falls.  - [place] fall precautions as indicated by assessment.  - Educate pt/family on patient safety including physical limitations  - Instruct pt to call for assistance with activity based on assessment  - Modify environment to reduce risk of injury  - Provide assistive devices as appropriate  - Consider OT/PT consult to assist with strengthening/mobility  - Encourage toileting schedule  Outcome: Completed     Problem: DISCHARGE PLANNING  Goal: Discharge to home or other facility with appropriate resources  Description: INTERVENTIONS:  - Identify barriers to discharge w/pt and caregiver  - Include patient/family/discharge partner in discharge planning  - Arrange for needed discharge resources and transportation as appropriate  - Identify discharge learning needs (meds, wound care, etc)  - Arrange for interpreters to assist at discharge as needed  - Consider post-discharge preferences of patient/family/discharge partner  - Complete POLST form as appropriate  - Assess patient's ability to be responsible for managing their own health  - Refer to Case Management Department for coordinating discharge planning if the patient needs post-hospital services based on physician/LIP order or complex needs related to functional status, cognitive ability or social support system  Outcome: Completed     Problem: CARDIOVASCULAR - ADULT  Goal: Maintains optimal cardiac output and hemodynamic stability  Description: INTERVENTIONS:  - Monitor vital signs, rhythm, and trends  - Monitor for bleeding, hypotension and signs of decreased cardiac output  - Evaluate effectiveness of vasoactive medications to optimize hemodynamic stability  - Monitor arterial and/or venous puncture sites for bleeding and/or hematoma  - Assess quality of pulses, skin color and temperature  - Assess for signs of decreased coronary artery perfusion - ex. Angina  - Evaluate  fluid balance, assess for edema, trend weights  Outcome: Completed     Problem: NEUROLOGICAL - ADULT  Goal: Achieves stable or improved neurological status  Description: INTERVENTIONS  - Assess for and report changes in neurological status  - Initiate measures to prevent increased intracranial pressure  - Maintain blood pressure and fluid volume within ordered parameters to optimize cerebral perfusion and minimize risk of hemorrhage  - Monitor temperature, glucose, and sodium. Initiate appropriate interventions as ordered  Outcome: Completed  Goal: Absence of seizures  Description: INTERVENTIONS  - Monitor for seizure activity  - Administer anti-seizure medications as ordered  - Monitor neurological status  Outcome: Completed  Goal: Remains free of injury related to seizure activity  Description: INTERVENTIONS:  - Maintain airway, patient safety  and administer oxygen as ordered  - Monitor patient for seizure activity, document and report duration and description of seizure to MD/LIP  - If seizure occurs, turn patient to side and suction secretions as needed  - Reorient patient post seizure  - Seizure pads on all 4 side rails  - Instruct patient/family to notify RN of any seizure activity  - Instruct patient/family to call for assistance with activity based on assessment  Outcome: Completed     Problem: RESPIRATORY - ADULT  Goal: Achieves optimal ventilation and oxygenation  Description: INTERVENTIONS:  - Assess for changes in respiratory status  - Assess for changes in mentation and behavior  - Position to facilitate oxygenation and minimize respiratory effort  - Oxygen supplementation based on oxygen saturation or ABGs  - Provide Smoking Cessation handout, if applicable  - Encourage broncho-pulmonary hygiene including cough, deep breathe, Incentive Spirometry  - Assess the need for suctioning and perform as needed  - Assess and instruct to report SOB or any respiratory difficulty  - Respiratory Therapy support as  indicated  - Manage/alleviate anxiety  - Monitor for signs/symptoms of CO2 retention  Outcome: Completed     Problem: GENITOURINARY - ADULT  Goal: Absence of urinary retention  Description: INTERVENTIONS:  - Assess patient’s ability to void and empty bladder  - Monitor intake/output and perform bladder scan as needed  - Follow urinary retention protocol/standard of care  - Consider collaborating with pharmacy to review patient's medication profile  - Implement strategies to promote bladder emptying  Outcome: Completed     Problem: Diabetes/Glucose Control  Goal: Glucose maintained within prescribed range  Description: INTERVENTIONS:  - Monitor Blood Glucose as ordered  - Assess for signs and symptoms of hyperglycemia and hypoglycemia  - Administer ordered medications to maintain glucose within target range  - Assess barriers to adequate nutritional intake and initiate nutrition consult as needed  - Instruct patient on self management of diabetes  Outcome: Completed

## 2024-07-05 NOTE — RESPIRATORY THERAPY NOTE
Patient received on following vent setting today morning- AC/12/500/35%/+5  ApneaTest-    ABG result on vent AC/10/500/+5/100%-  PH-7.21, PCO2- 46, PO2- 279    ABG result on O2 tubing inserted in to ET tube with 15 LPM-  PH-7.01, PCO2- 85, PO2- 190

## 2024-07-05 NOTE — PROGRESS NOTES
[facility] Hospitalist Progress Note     CC: Hospital Follow up    PCP: No primary care provider on file.       Assessment/Plan:   Patient is a 43-year-old female with no known medical history, who presents to the hospital for evaluation of headaches and feeling ill.  Being encephalopathic with meningitis/encephalitis as the likely cause.  6/28/2024 patient was intubated for airway protection. 6/29/24 with fixed dilated pupils, no gag reflex.  Serial CTH and MRI with diffuse anoxic brain injury.       Sepsis  Encephalopathy  Meningitis/Encephalitis  -CT brain and MRI brain 6/29/2024 with diffuse loss of gray-white differentiate consistent with anoxic injury   -on antimicrobials  -hold acyclovir further, renal function worsening. HSV studies negative  -LP done on 6/27  -mannitol attempted but still had cerebral edema noted   -poor prognosis  -discussed in detail with mother at bedside on 7/1. Neurologic recovery unlikely   -on pressors. Would aim to not escalate therapy.   -no indication at this time to repeat EEG or perform MRI, appreciate neurology evaluation and discussion with family, I agree with her assessment and risks of MRI etc  -exam is consistent with brain death     Acute respiratory failure  -intubated 6/28/24  -critical care on consult     Acute kidney injury  Hypernatremia-resolved  Metabolic acidosis  -Cr rising, worsening renal function  -stop further acyclovir   -on pressors  -now on sodium bicarb drip  -would not pursue dialysis given prognosis     Seizure  -on keppra    Code status: DNR    I have spoken to Mother last on 7/1. They were definitely in shock of patient's condition. We addressed code status, initially they wanted to change to full, but after further discussion on her findings, prognosis, and likely poor outcome, we decided to keep her DNR. Seemed they needed time to cope with this.   They understand that she is critical but as of yesterday wanted to confirm diagnosis etc.  Appreciate neurology attending discussion with family. I agree with Dr. Uriostegui's Assessment.  I also have mentioned to mother that she has signs of brain death on exam.   Discussed to have whatever family they need at bedside, and we can re-assess situation day by day. Would recommend compassionate wean when family is ready.    7/4: addressed with mother, brother, and father, as well as family member on the phone (who is a family physician). Went over overall care, prognosis, and options. I still have recommended pure comfort care and terminal wean most likely when they are ready. She is not improving neurologically and her brain damage is irreversible most likely, given her clinical exam.   Would not escalate care any further     7/5: no family present.     Brain death confirmed based on physical exam.     Plan should be terminal wean. Family will have to arrive and decide. I have spoken to them about this already and I suspect they just needed time to absorb all of this.     Would not escalate care.     [facility] Hospitalist        Subjective:     On ventilator  Not breathing over vent  No gag reflex, no corneal, and has dilated pupils.   On pressors still  NELIA worsening    OBJECTIVE:    Blood pressure 106/61, pulse 96, temperature 96.8 °F (36 °C), temperature source Temporal, resp. rate 12, height 5' 7\" (1.702 m), weight 121 lb 4.1 oz (55 kg), last menstrual period 06/27/2024, SpO2 95%.    Temp:  [96.8 °F (36 °C)-97.6 °F (36.4 °C)] 96.8 °F (36 °C)  Pulse:  [] 96  Resp:  [12-22] 12  BP: ()/(45-77) 106/61  SpO2:  [93 %-100 %] 95 %  FiO2 (%):  [35 %] 35 %      Intake/Output:    Intake/Output Summary (Last 24 hours) at 7/5/2024 1303  Last data filed at 7/5/2024 1028  Gross per 24 hour   Intake 3060 ml   Output 460 ml   Net 2600 ml       Last 3 Weights   07/05/24 0900 121 lb 4.1 oz (55 kg)   07/02/24 0500 120 lb 5.9 oz (54.6 kg)   07/01/24 0405 118 lb 2.7 oz (53.6 kg)   06/30/24  0522 118 lb 9.7 oz (53.8 kg)   06/29/24 0357 126 lb 12.2 oz (57.5 kg)   06/27/24 0704 121 lb 4.1 oz (55 kg)   06/27/24 0042 92 lb (41.7 kg)       /61   Pulse 96   Temp 96.8 °F (36 °C) (Temporal)   Resp 12   Ht 5' 7\" (1.702 m)   Wt 121 lb 4.1 oz (55 kg)   LMP 06/27/2024   SpO2 95%   BMI 18.99 kg/m²   General: intubated  Lungs: Mechanical breath sounds  Abdomen: soft  Extremities: No edema  Neuro: Fixed dilated pupils, no gag reflex     Data Review:       Labs:     Recent Labs   Lab 07/01/24 0617 07/02/24 0630 07/03/24 0438   RBC 3.59* 3.76* 3.62*   HGB 9.4* 9.7* 9.5*   HCT 28.6* 30.2* 29.0*   MCV 79.7* 80.3 80.1   MCH 26.2 25.8* 26.2   MCHC 32.9 32.1 32.8   RDW 16.6* 17.6* 18.0*   NEPRELIM 3.83 8.10* 12.84*   WBC 5.4 11.3* 15.7*   .0 209.0 175.0         Recent Labs   Lab 07/04/24  1527 07/04/24  2346 07/05/24  0821   * 105* 90   BUN 24* 31* 32*   CREATSERUM 2.41* 3.13* 3.22*   EGFRCR 25* 18* 17*   CA 5.4* 7.4* 7.4*   * 128* 129*   K 4.1 5.9* 5.4*   CL 93* 102 105   CO2 32.0 20.0* 19.0*       Recent Labs   Lab 06/29/24  0542 06/30/24  0434 07/01/24 0617 07/02/24 0630 07/03/24 0438   ALT 35 35 29 38 39   AST 64* 71* 78* 107* 114*   ALB 2.5* 2.7* 2.2* 2.3* 2.2*         Imaging:  XR ABDOMEN (1 VIEW) (CPT=74018)    Result Date: 7/3/2024  CONCLUSION:   There is a paucity of bowel gas.  No bowel dilatation is identified.  Weighted feeding tube terminates overlying the expected location of the distal stomach.    Dictated by (CST): Albert Sebastian MD on 7/03/2024 at 11:21 AM     Finalized by (CST): Albert Sebastian MD on 7/03/2024 at 11:22 AM             Meds:      [Held by provider] acyclovir  10 mg/kg Intravenous Q24H    cefepime  2 g Intravenous Q24H    famotidine  20 mg Intravenous Daily    vancomycin  125 mg Per NG Tube Daily    hydrocortisone Na succinate PF  100 mg Intravenous Q8H JAKE    Vancomycin IV  1 each Intravenous See Admin Instructions (RX holding)    sodium zirconium  cyclosilicate  10 g Per NG Tube Q8H    aspirin  324 mg Per NG Tube Daily    sevelamer carbonate  800 mg Oral TID CC    doxycycline  100 mg Intravenous Q12H    insulin aspart  1-11 Units Subcutaneous Q6H    metRONIDAZOLE  500 mg Intravenous q6h    levETIRAcetam  500 mg Intravenous Q12H    chlorhexidine gluconate  15 mL Mouth/Throat BID@0800,2000    heparin  5,000 Units Subcutaneous q12h      sodium bicarbonate in D5W infusion 150 mEq (07/05/24 1028)    phenylephrine 300 mcg/min (07/05/24 0800)    dextrose 10% Stopped (07/05/24 1028)    vasopressin (Vasostrict) 20 Units in sodium chloride 0.9% 100 mL infusion for septic shock 0.03 Units/min (07/05/24 0815)    norepinephrine 1 mcg/min (07/05/24 0600)    propofol Stopped (07/02/24 0000)       dextrose 10%    lipase-protease-amylase (Lip-Prot-Amyl) **AND** sodium bicarbonate    glucose **OR** glucose **OR** glucose-vitamin C **OR** dextrose **OR** glucose **OR** glucose **OR** glucose-vitamin C    labetalol    fentaNYL **OR** fentaNYL    polyethylene glycol (PEG 3350)    senna    bisacodyl    fleet enema    acetaminophen    ondansetron    prochlorperazine    LORazepam    acetaminophen    hydrALAzine

## 2024-07-05 NOTE — PROGRESS NOTES
Latest Reference Range & Units 07/05/24 12:24 07/05/24 12:32 07/05/24 14:44   ABG PH 7.35 - 7.45  7.21 (LL) 7.11 (LL) 7.01 (LL)   ABG PCO2 35 - 45 mm Hg 46 (H) 61 (HH) 85 (HH)   ABG PO2 80 - 100 mm Hg 279 (H) 48 (LL) 190 (H)   ABG HCO3 21.0 - 27.0 mEq/L 17.9 (L) 16.8 (L) 17.0 (L)   ABG O2 SATURATION 94.0 - 100.0 % >99.0 79.2 (LL) >99.0   Blood Gas Base Excess -2.0 - 2.0 mmol/L -9.0 (L) -10.0 (L) -10.2 (L)   Oxygen Delivery Device  Vent Other Other   Blood Gas Vent Mode  A/C     Blood Gas Respiration Rate BPM 10     Oxygen Content 15.0 - 23.0 Vol% 13.6 (L) 10.2 (L) 13.6 (L)   FiO2  100.00     L/M L/min  15.00 15.00   SAMPLE SITE  Right Radial Right Radial Right Radial   TIDAL VOLUME mL 500.0     PEEP cm H2O 5.0     (LL): Data is critically low  (HH): Data is critically high  (H): Data is abnormally high  (L): Data is abnormally low    ABGs as above indicate positive apnea test  Baseline ABG at 1224, first apnea test with ABG drawn after 4 minutes due to hypotension with borderline results   Second attempt with pressor support and hemodynamic stability maintained throughout testing period; Patient observed at bedside for 10 minutes without any spontaneous respirations     Brain death declared at 1452  Dr Tyson intensivist for positive apnea testing notification and declaration of brain death   Family notified     Jessica Lovelace   PCCU    Concur with above findings consistent with brain death.

## 2024-07-05 NOTE — DIETARY NOTE
ADULT NUTRITION REASSESSMENT    Pt is at high nutrition risk.  Pt does not meet malnutrition criteria.      RECOMMENDATIONS DTO MD:   RD monitoring for GOC discussions. Consider nutrition support if c/w pt's and family's wishes.   See Nutrition Intervention    ADMITTING DIAGNOSIS:  Hypokalemia [E87.6]  Delirium [R41.0]  Hyponatremia [E87.1]  New onset seizure (HCC) [R56.9]  PERTINENT PAST MEDICAL HISTORY: History reviewed. No pertinent past medical history.    PATIENT STATUS:   Initial 06/28/24: Pt admit for meningoencephalitis. Pt assessed due to receiving consult to start trickle feeds, \"can start today or in the AM.\" Pt just admitted yesterday with AMS. Noted to have poor dentition and damaged tooth. C/o headaches and tooth pain. Per neurology MD chart note, pt thought to have infectious meningoencephalitis complicated by seizure and encephalopathy. Pt having worsening lethargy today. Was eventually intubated. Unable to obtain diet hx at this time. No wt hx to review. Electrolytes abnormal at this time. Discussed with RN via phone call, states attempted to place OG tube, but blood was present. Will re-attempt OGT placement tomorrow.  6/29/2024:  Received MD consult to initiate/manage EN (Enteral Nutrition)   No tube access yet for EN to initiate. Intubate 6/28. Sedated on propofol. On Levophed. Infusing IV NS @ 83ml/hr.   Will check in tomorrow for access and EN start.  6/30/2024 Update:    EN initiation deferred, pt metabolically unstable, hence not safe to commence EN. And, currently no tube access to utilize   RN reports waiting for family to arrive, with plan to discuss Goals of care.  Nutrition support in future pending outcome of goal of care and decisions.  7/1/2024 Update: Consult to initiate tube feeds. Pt is at high risk for refeeding syndrome d/t severe malnutrition- tube feeds to start at low rate and advance slower towards goal. FWF increased to address hypernatremia as recommended by renal service.  IVF: D5W @ 75 ml/hr.   7/3 Update: Tube feeding held ~ 5:30 am d/t distended/firm abdomen and ?tf coming from mouth per RN. Abdominal xray ordered-negative. Pt is however on X 3 pressors and discussed at rounds unsafe for enteral nutrition at this time.  Noted Hyperkalemia- When/if safe to resume  T will utilize  low k+ Nepro.  IVF change noted. Levophed now @ 10 mcg/min, phenylephrine @ 320 mcg/min and vasopressin @ 0.03 Units/min. Hypernatremia resolved.  07/05/24 UPDATE: Remains intubated. Pt is unresponsive off sedation. \"Grim prognosis with findings concerning for brain death\" per MD documentation. GOC discussions on-going. Off EN feeds since early AM 7/3. Remains on pressor support x3. Pt discussed with RN on unit - no plans for nutrition support at this time.        FOOD/NUTRITION RELATED HISTORY:  Appetite: NPO  Intake: NPO  Intake Meeting Needs: NPO and No  Percent Meals Eaten (last 6 days)       None        Food Allergies: No Known Food Allergies (NKFA)  Cultural/Ethnic/Lutheran Preferences: Not Obtained    GASTROINTESTINAL: +BM loose/mucousy, green/brown, medium/large stool x3  (700 ml) / 24 hrs, NGT in place/NPO, and rectal tube; poor dentition    MEDICATIONS: reviewed; IVF provides 3.6L and 612 kcal from dextrose daily   sodium bicarbonate in D5W infusion 150 mEq (07/05/24 1028)    phenylephrine 300 mcg/min (07/05/24 0800)    dextrose 10% Stopped (07/05/24 1028)    vasopressin (Vasostrict) 20 Units in sodium chloride 0.9% 100 mL infusion for septic shock 0.03 Units/min (07/05/24 0815)    norepinephrine 1 mcg/min (07/05/24 0600)    propofol Stopped (07/02/24 0000)      [Held by provider] acyclovir  10 mg/kg Intravenous Q24H    cefepime  2 g Intravenous Q24H    famotidine  20 mg Intravenous Daily    vancomycin  125 mg Per NG Tube Daily    hydrocortisone Na succinate PF  100 mg Intravenous Q8H JAKE    Vancomycin IV  1 each Intravenous See Admin Instructions (RX holding)    sodium zirconium cyclosilicate   10 g Per NG Tube Q8H    aspirin  324 mg Per NG Tube Daily    sevelamer carbonate  800 mg Oral TID CC    doxycycline  100 mg Intravenous Q12H    insulin aspart  1-11 Units Subcutaneous Q6H    metRONIDAZOLE  500 mg Intravenous q6h    levETIRAcetam  500 mg Intravenous Q12H    chlorhexidine gluconate  15 mL Mouth/Throat BID@0800,2000    heparin  5,000 Units Subcutaneous q12h     LABS: reviewed; noted oliguric NELIA - not a candidate for dialysis per nephrology documentation; hyperkalemia   Recent Labs     07/02/24  0630 07/02/24  1610 07/03/24  0001 07/03/24  0438 07/03/24  0828 07/04/24  0944 07/04/24  1527 07/04/24  2346 07/05/24  0821   GLU 88  85   < > 86 93   < > 229* 682* 105* 90   BUN 11  11   < > 18 20   < > 25* 24* 31* 32*   CREATSERUM 1.07*  1.10*   < > 1.65* 1.86*   < > 2.63* 2.41* 3.13* 3.22*   CA 7.7*  7.3*   < > 7.7* 7.8*   < > 6.6* 5.4* 7.4* 7.4*   MG 1.0*  --  1.9 1.8  --  1.7  --  1.8  --    *  149*   < > 144 140   < > 129* 126* 128* 129*   K 4.9  4.9   < > 5.4* 6.0*   < > 4.8 4.1 5.9* 5.4*   *  123*   < > 121* 118*   < > 107 93* 102 105   CO2 18.0*  18.0*   < > 15.0* 15.0*   < > 18.0* 32.0 20.0* 19.0*   PHOS 4.5  --  6.1*  --   --   --   --   --   --    OSMOCALC 307*  307*   < > 299* 292   < > 280 298* 273* 274*    < > = values in this interval not displayed.     NUTRITION RELATED PHYSICAL FINDINGS:  - Nutrition Focused Physical Exam (NFPE):  unable to complete at this time  - Fluid Accumulation: non-pitting Bilateral Upper extremity, Lower extremity, Hand (s), and Feet and generalized and none  ---> See RN documentation for details  - Skin Integrity: at risk ---> See RN documentation for details    ANTHROPOMETRICS:  HT: 170.2 cm (5' 7\")  WT: 55 kg (121 lb 4.1 oz)   BMI: Body mass index is 18.99 kg/m².  BMI CLASSIFICATION: less than 19 kg/m2 - underweight  IBW: 135 lbs        89% IBW  Usual Body Wt: unsure, no wt hx to review.         WEIGHT HISTORY:  Patient Weight(s) for the past  336 hrs:   Weight   07/05/24 0900 55 kg (121 lb 4.1 oz)   07/02/24 0500 54.6 kg (120 lb 5.9 oz)   07/01/24 0405 53.6 kg (118 lb 2.7 oz)   06/30/24 0522 53.8 kg (118 lb 9.7 oz)   06/29/24 0357 57.5 kg (126 lb 12.2 oz)   06/27/24 0704 55 kg (121 lb 4.1 oz)   06/27/24 0042 41.7 kg (92 lb)     Wt Readings from Last 10 Encounters:   07/05/24 55 kg (121 lb 4.1 oz)     NUTRITION DIAGNOSIS/PROBLEM:   Inadequate oral intake related to Decreased ability to consume sufficient energy as evidenced by NPO status and intubation    NUTRITION DIAGNOSIS PROGRESS:  No Improvement (continue) - EN feeds 7/1-7/3 now held d/t intolerance/ high dose pressor support.     NUTRITION INTERVENTION:     NUTRITION PRESCRIPTION:   Estimated Nutrition needs: --dosing wt of 55 kg - wt taken on 6/28  Calories: 5389-1793 calories/day (25-30 calories per kg Dosing wt)  [facility]B: 1257 kcal/day (23 kcal/kg)  Protein: 66-77 g protein/day (1.2-1.4 g protein/kg Dosing wt)  Fluid Needs: ~1ml/kcal     - Diet:       Procedures    NPO      - Meals and snacks: NPO  - Medical Food Supplements- NPO.   - Vitamin and mineral supplements: none  - Feeding assistance: NPO  - Nutrition education: not appropriate at this time     - Coordination of nutrition care: collaboration with other providers - discussed with RN on unit  - Discharge and transfer of nutrition care to new setting or provider: monitor plans    MONITOR AND EVALUATE/NUTRITION GOALS:  - Food and Nutrient Intake:      Monitor: for PO initiation when safe   - Food and Nutrient Administration:      Monitor: enteral nutrition initiation  - Anthropometric Measurement:    Monitor weight  - Nutrition Goals:      TF meet >80% of goal within first week , labs within acceptable limits, minimize lean body mass loss, and support body systems    DIETITIAN FOLLOW UP: RD to follow and monitor nutrition status    Emily Cai MS, RD, LDN, CNSC  [id number]

## 2024-07-06 NOTE — DISCHARGE SUMMARY
General Medicine Discharge Summary     Patient ID:  Calos Hernandez  43 year old  1980    Admit date: 2024    Discharge date and time: 2024  2:52 PM     Attending Physician: Alejandro Bob DO    Primary Care Physician: No primary care provider on file.     Discharge Diagnoses:   Sepsis  Encephalopathy  Meningitis/Encephalitis  Acute respiratory failure  Acute kidney injury  Hypernatremia-resolved  Metabolic acidosis  Seizure    Discharge Condition:      Hospital Course:    Patient is a 43-year-old female with no known medical history, who presents to the hospital for evaluation of headaches and feeling ill.  Being encephalopathic with meningitis/encephalitis as the likely cause.  2024 patient was intubated for airway protection. 24 with fixed dilated pupils, no gag reflex.  Serial CTH and MRI with diffuse anoxic brain injury.       Sepsis  Encephalopathy  Meningitis/Encephalitis  -CT brain and MRI brain 2024 with diffuse loss of gray-white differentiate consistent with anoxic injury   -on antimicrobials  -on  holding acyclovir further, renal function worsening. HSV studies negative  -LP done on   -mannitol attempted but still had cerebral edema noted   -poor prognosis  -discussed in detail with mother at bedside on . Neurologic recovery unlikely   -on pressors. Would aim to not escalate therapy.   -no indication at this time to repeat EEG or perform MRI, appreciate neurology evaluation and discussion with family, I agree with her assessment and risks of MRI etc  -exam is consistent with brain death     Acute respiratory failure  -intubated 24  -critical care on consult      Acute kidney injury  Hypernatremia-resolved  Metabolic acidosis  -Cr rising, worsening renal function  -stopped further acyclovir   -on pressors  -was on sodium bicarb drip  -would not pursue dialysis given prognosis      Seizure  -on keppra      Code status: DNR     I have spoken to Mother last on 7/1. They were definitely in shock of patient's condition. We addressed code status, initially they wanted to change to full, but after further discussion on her findings, prognosis, and likely poor outcome, we decided to keep her DNR. Seemed they needed time to cope with this.   They understand that she is critical but as of yesterday wanted to confirm diagnosis etc. Appreciate neurology attending discussion with family. I agree with Dr. Uriostegui's Assessment.  I also have mentioned to mother that she has signs of brain death on exam.   Discussed to have whatever family they need at bedside, and we can re-assess situation day by day. Would recommend compassionate wean when family is ready.     7/4: addressed with mother, brother, and father, as well as family member on the phone (who is a family physician). Went over overall care, prognosis, and options. I still have recommended pure comfort care and terminal wean most likely when they are ready. She is not improving neurologically and her brain damage is irreversible most likely, given her clinical exam.   Would not escalate care any further      7/5: no family present.      Brain death confirmed based on physical exam.     Apnea test performed and consistent with brain death    Intensivist called family and discussed terminating currently therapy given above exam.     Consults:   IP CONSULT TO NEUROLOGY  IP CONSULT TO PULMONOLOGY  IP CONSULT TO FOOD AND NUTRITION SERVICES  IP CONSULT TO NEPHROLOGY  IP CONSULT TO HOSPICE  IP CONSULT TO SOCIAL WORK  IP CONSULT TO SPIRITUAL CARE    Code Status: DNAR/Comfort Care    Exam on day of discharge:   Fixed pupils, dilated  Absence of corneal reflexes   Absence of oculocephalic reflex  Absence of cough and gag   Absent pain response with deep sternal rub as well as nail bed pressure in all extremities   Absence of spontaneous respirations over set rate, on full vent support,  no respiration initiated on cpap mode x 30secs   Absence of spontaneous movement        Total time coordinating care for discharge: Greater than 30 minutes    Alejandro Bob DO

## 2024-07-06 NOTE — SPIRITUAL CARE NOTE
Spiritual Care Visit Note    Patient Name: Calos Hernandez Date of Spiritual Care Visit: 24   : 1980 Primary Dx: Meningoencephalitis (HCC)       Referred By: Referral From: Nurse    Spiritual Care Taxonomy:    Intended Effects: Convey a calming presence    Methods: Encourage sharing of feelings    Interventions: Silent prayer    Visit Type/Summary:     - Spiritual Care:  arrived after patient had . Family had not stayed during the change in treatment plan.  provided silent prayer and presence for the RN staff  remains available as needed for follow up.    Spiritual Care support can be requested via an [facility] consult. For urgent/immediate needs, please contact the On Call  at: [place]: ext [phone]    Rev Lynn Yang MDiv

## 2024-07-08 LAB
WNV IGG CSF ENCEPH: 0.1 IV
WNV IGM CSF ENCEPH: 0.4 IV

## 2024-07-11 LAB
AGNA-1: NEGATIVE
AMPA-R1 ANTIBODY: NEGATIVE
AMPA-R2 ANTIBODY: NEGATIVE
AMPHIPHYSIN ANTIBODY: NEGATIVE
ANTI-HU AB: NEGATIVE
ANTI-RI AB: NEGATIVE
ANTI-YO AB: NEGATIVE
ANTINERUONAL NUCLEAR AB TYPE 3: NEGATIVE
AQUAPORIN 4 ANTIBODY: NEGATIVE
CASPR2 ANTIBODY,CELL-BASED IFA: NEGATIVE
CRMP-5 IGG: NEGATIVE
DNER ANTIBODY: NEGATIVE
DPPX ANTIBODY: NEGATIVE
GABA-B-R ANTIBODY: NEGATIVE
GAD65 ANTIBODY: NEGATIVE
IGLON5 ANTIBODY: NEGATIVE
INTERPRETATION: NEGATIVE
ITPR1 ANTIBODY: NEGATIVE
LGI1 ANTIBODY, CELL-BASED IFA: NEGATIVE
MA2/TA ANTIBODY: NEGATIVE
MGLUR1 ANTIBODY: NEGATIVE
NMDA-R ANTIBODY: NEGATIVE
PURKINJE CELL CYTO AB TYPE 2: NEGATIVE
PURKINJE CELL CYTO AB TYPE TR: NEGATIVE
VGCC ANTIBODY: <1 PMOL/L
ZIC4 ANTIBODY: NEGATIVE

## (undated) NOTE — LETTER
[place] [address]  Authorization for Invasive Procedures  Date: ***           Time: ***    {[facility] ivs consent:[id number]}

## (undated) NOTE — LETTER
[place] [address]  Authorization for Invasive Procedures  Date: 6/27/2024           Time: 1426    I hereby authorize Kerwin Howard my physician and his/her assistants (if applicable), which may include medical students, residents, and/or fellows, to perform the following surgical operation/ procedure and administer such anesthesia as may be determined necessary by my physician:  Operation/Procedure name (s)  Lumbar Puncture surgical operation/procedure, unforeseen conditions may necessitate additional or different procedures than those listed above.  I, therefore, further authorize and request that the above-named surgeon, assistants, or designees perform such procedures as are, in their judgment, necessary and desirable.    3.   My surgeon/physician has discussed prior to my surgery the potential benefits, risks and side effects of this procedure; the likelihood of achieving goals; and potential problems that might occur during recuperation.  They also discussed reasonable alternatives to the procedure, including risks, benefits, and side effects related to the alternatives and risks related to not receiving this procedure.  I have had all my questions answered and I acknowledge that no guarantee has been made as to the result that may be obtained.    4.   Should the need arise during my operation/procedure, which includes change of level of care prior to discharge, I also consent to the administration of blood and/or blood products.  Further, I understand that despite careful testing and screening of blood or blood products by collecting agencies, I may still be subject to ill effects as a result of receiving a blood transfusion and/or blood products.  The following are some, but not all, of the potential risks that can occur: fever and allergic reactions, hemolytic reactions, transmission of diseases such as Hepatitis, AIDS and Cytomegalovirus (CMV) and fluid overload.  In the event  that I wish to have an autologous transfusion of my own blood, or a directed donor transfusion, I will discuss this with my physician.  Check only if Refusing Blood or Blood Products  I understand refusal of blood or blood products as deemed necessary by my physician may have serious consequences to my condition to include possible death. I hereby assume responsibility for my refusal and release the hospital, its personnel, and my physicians from any responsibility for the consequences of my refusal.          o  Refuse      5.   I authorize the use of any specimen, organs, tissues, body parts or foreign objects that may be removed from my body during the operation/procedure for diagnosis, research or teaching purposes and their subsequent disposal by hospital authorities.  I also authorize the release of specimen test results and/or written reports to my treating physician on the hospital medical staff or other referring or consulting physicians involved in my care, at the discretion of the Pathologist or my treating physician.    6.   I consent to the photographing or videotaping of the operations or procedures to be performed, including appropriate portions of my body for medical, scientific, or educational purposes, provided my identity is not revealed by the pictures or by descriptive texts accompanying them.  If the procedure has been photographed/videotaped, the surgeon will obtain the original picture, image, videotape or CD.  The hospital will not be responsible for storage, release or maintenance of the picture, image, tape or CD.    7.   I consent to the presence of a  or observers in the operating room as deemed necessary by my physician or their designees.    8.   I recognize that in the event my procedure results in extended X-Ray/fluoroscopy time, I may develop a skin reaction.    9. If I have a Do Not Attempt Resuscitation (DNAR) order in place, that status will be suspended while in  the operating room, procedural suite, and during the recovery period unless otherwise explicitly stated by me (or a person authorized to consent on my behalf). The surgeon or my attending physician will determine when the applicable recovery period ends for purposes of reinstating the DNAR order.  10. Patients having a sterilization procedure: I understand that if the procedure is successful the results will be permanent and it will therefore be impossible for me to inseminate, conceive, or bear children.  I also understand that the procedure is intended to result in sterility, although the result has not been guaranteed.   11. I acknowledge that my physician has explained sedation/analgesia administration to me including the risk and benefits I consent to the administration of sedation/analgesia as may be necessary or desirable in the judgment of my physician.    I CERTIFY THAT I HAVE READ AND FULLY UNDERSTAND THE ABOVE CONSENT TO OPERATION and/or OTHER PROCEDURE.        ____________________________________       _________________________________      ______________________________  Signature of Patient         Signature of Responsible Person        Printed Name of Responsible Person    ____________________________________      _________________________________      ______________________________       Signature of Witness          Relationship to Patient                       Date                                       Time  Patient Name: Calos Hernandez  : 1980    Reviewed: 2024   Printed: 2024  Medical Record #: [id number] Page 1 of 2           STATEMENT OF PHYSICIAN My signature below affirms that prior to the time of the procedure; I have explained to the patient and/or his/her legal representative, the risks and benefits involved in the proposed treatment and any reasonable alternative to the proposed treatment. I have also explained the risks and benefits involved in refusal of the  proposed treatment and alternatives to the proposed treatment and have answered the patient's questions. If I have a significant financial interest in a co-management agreement or a significant financial interest in any product or implant, or other significant relationship used in this procedure/surgery, I have disclosed this and had a discussion with my patient.     _______________________________________________________________ _____________________________  (Signature of Physician)                                                                                         (Date)                                   (Time)  Patient Name: Calos Hernandez  : 1980    Reviewed: 2024   Printed: 2024  Medical Record #: [id number] Page 2 of 2

## (undated) NOTE — LETTER
[facility]  part of [facility]     PICC INSERTION CONSENT     I agree to have a Peripherally Inserted Central Catheter (PICC) placed in my arm.   1. The PICC insertion procedure, care, maintenance, risks, benefits, and complications have been explained to me by my physician, ________________________, and I understand them.   2. I understand that this may not be the only way I can receive my medication. I understand that my physician has determined that the PICC would be the safest and most effective means of administering my medication at this time. If there are other options of giving medication into my veins those options have been explained to me by my physician and I have chosen this one.   3. I realize a nurse who has been specially trained and certified by the hospital and ’s representative to insert a PICC will perform this procedure. My catheter will be inserted by _____________________________.   4. I have been informed by my doctor of the nature and purpose of this procedure and the risks involved and the possibility of complications. I realize that this is an invasive procedure and has certain risks such as air embolism (air entering the catheter or my vein), arterial puncture (a tearing of one of my arteries), infection, irregular heartbeat and venous thrombosis (a blood clot in a vein) nerve injury and fracture of the catheter with or without migration.   5. In order to numb the area where the line will be placed, a small amount of anesthetic medication will be injected as ordered by my physician.   6. I understand that while the catheter will be placed in my upper arm the end of the catheter will come to rest in an area near my heart.     7. The person performing this procedure has discussed the potential benefits, risks, and side effects of the PICC; the likelihood of achieving goals; and potential problems that might occur during recuperation. They also discussed  reasonable alternatives to the PICC, including risks, benefits, and side effects related to the alternatives and risks related to not receiving this procedure.    8.  I have expressed any questions about this procedure to my physician or the PICC Proceduralist and he/she has answered them.  I certify that I have read and understand this consent to the insertion of a PICC.      _________________________________________________________   Date     Time     Patient/Guardian Signature       ____________________________________   Printed name of Patient/Guardian          ________________________________________________________________    Date        Time                   Witnessing RN Signature      Patient Name: Calos Hernandez     : 1980                 Printed: 2024     Medical Record #: [id number]